# Patient Record
Sex: MALE | Race: WHITE | HISPANIC OR LATINO | URBAN - METROPOLITAN AREA
[De-identification: names, ages, dates, MRNs, and addresses within clinical notes are randomized per-mention and may not be internally consistent; named-entity substitution may affect disease eponyms.]

---

## 2022-03-17 ENCOUNTER — EMERGENCY (EMERGENCY)
Facility: HOSPITAL | Age: 27
LOS: 0 days | Discharge: HOME | End: 2022-03-17
Attending: EMERGENCY MEDICINE | Admitting: EMERGENCY MEDICINE
Payer: MEDICAID

## 2022-03-17 VITALS
DIASTOLIC BLOOD PRESSURE: 72 MMHG | WEIGHT: 160.06 LBS | HEART RATE: 61 BPM | SYSTOLIC BLOOD PRESSURE: 123 MMHG | TEMPERATURE: 98 F | HEIGHT: 64 IN | RESPIRATION RATE: 20 BRPM | OXYGEN SATURATION: 100 %

## 2022-03-17 PROCEDURE — 99053 MED SERV 10PM-8AM 24 HR FAC: CPT

## 2022-03-17 PROCEDURE — 99284 EMERGENCY DEPT VISIT MOD MDM: CPT

## 2022-03-17 NOTE — ED PROVIDER NOTE - PATIENT PORTAL LINK FT
You can access the FollowMyHealth Patient Portal offered by [facility] by registering at the following website: [email or web address]. By joining [facility]’s FollowMyHealth portal, you will also be able to view your health information using other applications (apps) compatible with our system.
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
## 2022-03-17 NOTE — ED PROVIDER NOTE - OBJECTIVE STATEMENT
27 yo male intox sts drank bottle of jhonatan. has no complaints. denies si  hi. asking for apple juice.
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
## 2022-03-18 DIAGNOSIS — F10.929 ALCOHOL USE, UNSPECIFIED WITH INTOXICATION, UNSPECIFIED: ICD-10-CM

## 2022-04-08 ENCOUNTER — EMERGENCY (EMERGENCY)
Facility: HOSPITAL | Age: 27
LOS: 0 days | Discharge: HOME | End: 2022-04-08
Attending: EMERGENCY MEDICINE | Admitting: EMERGENCY MEDICINE
Payer: MEDICAID

## 2022-04-08 VITALS
HEART RATE: 72 BPM | HEIGHT: 64 IN | RESPIRATION RATE: 18 BRPM | OXYGEN SATURATION: 99 % | SYSTOLIC BLOOD PRESSURE: 125 MMHG | TEMPERATURE: 97 F | DIASTOLIC BLOOD PRESSURE: 60 MMHG

## 2022-04-08 VITALS
OXYGEN SATURATION: 99 % | HEART RATE: 64 BPM | SYSTOLIC BLOOD PRESSURE: 106 MMHG | DIASTOLIC BLOOD PRESSURE: 63 MMHG | TEMPERATURE: 97 F | RESPIRATION RATE: 18 BRPM

## 2022-04-08 DIAGNOSIS — F41.8 OTHER SPECIFIED ANXIETY DISORDERS: ICD-10-CM

## 2022-04-08 DIAGNOSIS — F17.200 NICOTINE DEPENDENCE, UNSPECIFIED, UNCOMPLICATED: ICD-10-CM

## 2022-04-08 DIAGNOSIS — Z88.2 ALLERGY STATUS TO SULFONAMIDES: ICD-10-CM

## 2022-04-08 DIAGNOSIS — R45.851 SUICIDAL IDEATIONS: ICD-10-CM

## 2022-04-08 DIAGNOSIS — F19.10 OTHER PSYCHOACTIVE SUBSTANCE ABUSE, UNCOMPLICATED: ICD-10-CM

## 2022-04-08 DIAGNOSIS — F20.9 SCHIZOPHRENIA, UNSPECIFIED: ICD-10-CM

## 2022-04-08 LAB
ALBUMIN SERPL ELPH-MCNC: 4.6 G/DL — SIGNIFICANT CHANGE UP (ref 3.5–5.2)
ALP SERPL-CCNC: 63 U/L — SIGNIFICANT CHANGE UP (ref 30–115)
ALT FLD-CCNC: 12 U/L — SIGNIFICANT CHANGE UP (ref 0–41)
AMPHET UR-MCNC: POSITIVE
ANION GAP SERPL CALC-SCNC: 14 MMOL/L — SIGNIFICANT CHANGE UP (ref 7–14)
APAP SERPL-MCNC: <5 UG/ML — LOW (ref 10–30)
AST SERPL-CCNC: 23 U/L — SIGNIFICANT CHANGE UP (ref 0–41)
BARBITURATES UR SCN-MCNC: NEGATIVE — SIGNIFICANT CHANGE UP
BASOPHILS # BLD AUTO: 0.04 K/UL — SIGNIFICANT CHANGE UP (ref 0–0.2)
BASOPHILS NFR BLD AUTO: 0.5 % — SIGNIFICANT CHANGE UP (ref 0–1)
BENZODIAZ UR-MCNC: NEGATIVE — SIGNIFICANT CHANGE UP
BILIRUB SERPL-MCNC: 0.2 MG/DL — SIGNIFICANT CHANGE UP (ref 0.2–1.2)
BUN SERPL-MCNC: 19 MG/DL — SIGNIFICANT CHANGE UP (ref 10–20)
CALCIUM SERPL-MCNC: 9 MG/DL — SIGNIFICANT CHANGE UP (ref 8.5–10.1)
CHLORIDE SERPL-SCNC: 102 MMOL/L — SIGNIFICANT CHANGE UP (ref 98–110)
CO2 SERPL-SCNC: 21 MMOL/L — SIGNIFICANT CHANGE UP (ref 17–32)
COCAINE METAB.OTHER UR-MCNC: NEGATIVE — SIGNIFICANT CHANGE UP
CREAT SERPL-MCNC: 0.8 MG/DL — SIGNIFICANT CHANGE UP (ref 0.7–1.5)
DRUG SCREEN 1, URINE RESULT: SIGNIFICANT CHANGE UP
EGFR: 125 ML/MIN/1.73M2 — SIGNIFICANT CHANGE UP
EOSINOPHIL # BLD AUTO: 0.19 K/UL — SIGNIFICANT CHANGE UP (ref 0–0.7)
EOSINOPHIL NFR BLD AUTO: 2.1 % — SIGNIFICANT CHANGE UP (ref 0–8)
ETHANOL SERPL-MCNC: <10 MG/DL — SIGNIFICANT CHANGE UP
FENTANYL UR QL: NEGATIVE — SIGNIFICANT CHANGE UP
GLUCOSE SERPL-MCNC: 137 MG/DL — HIGH (ref 70–99)
HCT VFR BLD CALC: 40.9 % — LOW (ref 42–52)
HGB BLD-MCNC: 13.8 G/DL — LOW (ref 14–18)
IMM GRANULOCYTES NFR BLD AUTO: 0.2 % — SIGNIFICANT CHANGE UP (ref 0.1–0.3)
LYMPHOCYTES # BLD AUTO: 1.78 K/UL — SIGNIFICANT CHANGE UP (ref 1.2–3.4)
LYMPHOCYTES # BLD AUTO: 20.1 % — LOW (ref 20.5–51.1)
MCHC RBC-ENTMCNC: 27.9 PG — SIGNIFICANT CHANGE UP (ref 27–31)
MCHC RBC-ENTMCNC: 33.7 G/DL — SIGNIFICANT CHANGE UP (ref 32–37)
MCV RBC AUTO: 82.8 FL — SIGNIFICANT CHANGE UP (ref 80–94)
METHADONE UR-MCNC: NEGATIVE — SIGNIFICANT CHANGE UP
MONOCYTES # BLD AUTO: 0.51 K/UL — SIGNIFICANT CHANGE UP (ref 0.1–0.6)
MONOCYTES NFR BLD AUTO: 5.7 % — SIGNIFICANT CHANGE UP (ref 1.7–9.3)
NEUTROPHILS # BLD AUTO: 6.33 K/UL — SIGNIFICANT CHANGE UP (ref 1.4–6.5)
NEUTROPHILS NFR BLD AUTO: 71.4 % — SIGNIFICANT CHANGE UP (ref 42.2–75.2)
NRBC # BLD: 0 /100 WBCS — SIGNIFICANT CHANGE UP (ref 0–0)
OPIATES UR-MCNC: NEGATIVE — SIGNIFICANT CHANGE UP
OXYCODONE UR-MCNC: NEGATIVE — SIGNIFICANT CHANGE UP
PCP UR-MCNC: NEGATIVE — SIGNIFICANT CHANGE UP
PLATELET # BLD AUTO: 295 K/UL — SIGNIFICANT CHANGE UP (ref 130–400)
POTASSIUM SERPL-MCNC: 4.1 MMOL/L — SIGNIFICANT CHANGE UP (ref 3.5–5)
POTASSIUM SERPL-SCNC: 4.1 MMOL/L — SIGNIFICANT CHANGE UP (ref 3.5–5)
PROPOXYPHENE QUALITATIVE URINE RESULT: NEGATIVE — SIGNIFICANT CHANGE UP
PROT SERPL-MCNC: 6.6 G/DL — SIGNIFICANT CHANGE UP (ref 6–8)
RBC # BLD: 4.94 M/UL — SIGNIFICANT CHANGE UP (ref 4.7–6.1)
RBC # FLD: 13.1 % — SIGNIFICANT CHANGE UP (ref 11.5–14.5)
SALICYLATES SERPL-MCNC: <0.3 MG/DL — LOW (ref 4–30)
SARS-COV-2 RNA SPEC QL NAA+PROBE: SIGNIFICANT CHANGE UP
SODIUM SERPL-SCNC: 137 MMOL/L — SIGNIFICANT CHANGE UP (ref 135–146)
THC UR QL: POSITIVE
WBC # BLD: 8.87 K/UL — SIGNIFICANT CHANGE UP (ref 4.8–10.8)
WBC # FLD AUTO: 8.87 K/UL — SIGNIFICANT CHANGE UP (ref 4.8–10.8)

## 2022-04-08 PROCEDURE — 93010 ELECTROCARDIOGRAM REPORT: CPT

## 2022-04-08 PROCEDURE — 99285 EMERGENCY DEPT VISIT HI MDM: CPT

## 2022-04-08 PROCEDURE — 99053 MED SERV 10PM-8AM 24 HR FAC: CPT

## 2022-04-08 PROCEDURE — 90792 PSYCH DIAG EVAL W/MED SRVCS: CPT
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
## 2022-04-08 NOTE — ED PROVIDER NOTE - OBJECTIVE STATEMENT
The patient is a 26 year old male with a history of alcohol and marijuana use, schizophrenia presenting for anxiety and suicidal ideation. Patient states he feels like overdosing from stress; states he hears voices telling him "to do bad things." Also states he has been having arguments with the people that live around him who he say have been stealing things from him. States he used cocaine yesterday. Denies chest pain, sob.
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
## 2022-04-08 NOTE — SBIRT NOTE ADULT - NSSBIRTALCPASSREFTXDET_GEN_A_CORE
Referral for complete assessment and level of care determination at a certified treatment facility was completed by giving the patient information for treatment facilities that met their needs and encouraging them to call for an appointment. Patient requested and was provided with referral information to [facility], [address], [place] [id number], [phone].  Patient provided with phone number for telephonic SBIRT ([phone]) for future follow up.  Naloxone Rescue Kit dispensed: Pt was educated about Naloxone and trained on how utilize the kit.
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
## 2022-04-08 NOTE — ED PROVIDER NOTE - PHYSICAL EXAMINATION
CONSTITUTIONAL: Well-developed; well-nourished; in no acute distress.   SKIN: warm, dry  HEAD: Normocephalic; atraumatic.  EYES: no conjunctival injection. PERRLA. EOMI.   ENT: No nasal discharge; airway clear.  NECK: Supple; non tender.  CARD: S1, S2 normal; no murmurs, gallops, or rubs. Regular rate and rhythm.   RESP: No wheezes, rales or rhonchi.  ABD: soft ntnd  EXT: Normal ROM.  No clubbing, cyanosis or edema.   LYMPH: No acute cervical adenopathy.  NEURO: Alert, oriented, grossly unremarkable.  PSYCH: Cooperative, +SI, +auditory hallucinations.

## 2022-04-08 NOTE — SBIRT NOTE ADULT - NSSBIRTBRIEFINTDET_GEN_A_CORE
Provided SBIRT services: Full Screen Positive. Brief Intervention and Referral to Treatment Performed.   Screening results were reviewed with the patient and patient was provided information about healthy guidelines and potential negative consequences associated with level of risk. Motivation and readiness to reduce or stop use was discussed and goals and activities to make changes were suggested/offered.

## 2022-04-08 NOTE — ED PROVIDER NOTE - CLINICAL SUMMARY MEDICAL DECISION MAKING FREE TEXT BOX
Received pt from Dr Gaitan awaiting psychiatric assessment after he presented with suicidal ideation. He remained calm in the ED. He was evaluated by psych who felt he was safe to be discharged; he spoke with social work as well and outpt followup has been arranged. He understands to return to the ED for persistent symptoms.

## 2022-04-08 NOTE — ED PROVIDER NOTE - NS ED ROS FT
Eyes:  No visual changes, eye pain or discharge.  ENMT:  No hearing changes, pain, discharge or infections. No neck pain or stiffness.  Cardiac:  no chest pain, SOB or edema. No chest pain with exertion.  Respiratory:  No cough or respiratory distress. No hemoptysis..  GI:  No nausea, vomiting, diarrhea or abdominal pain.  :  No dysuria, frequency or burning.  MS:  No myalgia, muscle weakness, joint pain or back pain.  Neuro:  No headache or weakness.  No LOC.  Skin:  No skin rash.   Endocrine: No history of thyroid disease or diabetes.

## 2022-04-08 NOTE — ED PROVIDER NOTE - NSFOLLOWUPINSTRUCTIONS_ED_ALL_ED_FT
Your visit in the emergency department today did not reveal anything immediately life-threatening.    However, it is important that you follow-up with PSYCHIATRY and THERAPY/COUNSELING.    Additionally, it is important that you follow-up with your PRIMARY CARE PHYSICIAN within 3-5 days.  If you do not have a primary care physician, please call your health insurance to select one.  If you do not have health insurance, please schedule an appointment with the [facility]: [phone], located at [address] [place] [address].    ---------------------------------------------------------------------------------------------------    Sometimes after experiencing life stressors, you may have symptoms that include sadness, hopelessness, helplessness, restlessness, fatigue, irritability, and/or difficulty concentrating. This may interfere with life functions, including relationships, work, and school.    If these symptoms become persistent and disruptive to your everyday life, they may be due to depression, anxiety, or another mental illness. These illnesses can be addressed with medications and therapy.    Medications sometimes take a while to start working. Plus, it sometimes takes a few tries to find the right medication or combination of medication If you were started on a medication, make sure to take exactly as prescribed and follow up with a psychiatrist. In addition to medicine, psychotherapy (counseling) can help. This involves meeting with a therapist to talk about your feelings, thoughts, and life. There are different types of psychotherapy. In general, they all focus on helping you learn new ways of thinking and behaving, so you can better cope with your life stressors.    [facility] WELL: [phone]-[facility]-WELL ([phone])  At any hour of any day, in almost any language, from phone, tablet or computer, [facility] Well is your connection to get the help you need: peer support and short-term counseling via telephone, text and web.    SEEK IMMEDIATE MEDICAL CARE IF YOU HAVE ANY OF THE FOLLOWING SYMPTOMS: thoughts about hurting killing yourself, thoughts about hurting or killing somebody else, hallucinations, or worsening depression. Your visit in the emergency department today did not reveal anything immediately life-threatening.    However, it is important that you follow-up with PSYCHIATRY and THERAPY/COUNSELING as referred by Psychiatry Team here.     Additionally, it is important that you follow-up with your PRIMARY CARE PHYSICIAN within 3-5 days.  If you do not have a primary care physician, please call your health insurance to select one.  If you do not have health insurance, please schedule an appointment with the [facility]: [phone], located at [address] [place] [address].    ---------------------------------------------------------------------------------------------------    Sometimes after experiencing life stressors, you may have symptoms that include sadness, hopelessness, helplessness, restlessness, fatigue, irritability, and/or difficulty concentrating. This may interfere with life functions, including relationships, work, and school.    If these symptoms become persistent and disruptive to your everyday life, they may be due to depression, anxiety, or another mental illness. These illnesses can be addressed with medications and therapy.    Medications sometimes take a while to start working. Plus, it sometimes takes a few tries to find the right medication or combination of medication If you were started on a medication, make sure to take exactly as prescribed and follow up with a psychiatrist. In addition to medicine, psychotherapy (counseling) can help. This involves meeting with a therapist to talk about your feelings, thoughts, and life. There are different types of psychotherapy. In general, they all focus on helping you learn new ways of thinking and behaving, so you can better cope with your life stressors.    [facility]: [phone] ([phone])  At any hour of any day, in almost any language, from phone, tablet or computer, [facility] is your connection to get the help you need: peer support and short-term counseling via telephone, text and web.    SEEK IMMEDIATE MEDICAL CARE IF YOU HAVE ANY OF THE FOLLOWING SYMPTOMS: thoughts about hurting killing yourself, thoughts about hurting or killing somebody else, hallucinations, or worsening depression.

## 2022-04-08 NOTE — CHART NOTE - NSCHARTNOTEFT_GEN_A_CORE
Pt is a 26 year old male who presented to the ED with a c/o homicidal thoughts; pt reported that he has Schizophrenia, is feeling stressed and feels as though he wants to hurt himself or someone else (as per the ED Triage Note).  SW left  for the CATCH Team requesting that they f/up with pt in the ED.    	   Page 1 of 1

## 2022-04-08 NOTE — ED ADULT TRIAGE NOTE - CHIEF COMPLAINT QUOTE
pt sts he has schizophrenia and he is feeling stressed and he feels like he wants to hurts someone or himself. pt placed on nsg 1:1 for safety.

## 2022-04-08 NOTE — ED BEHAVIORAL HEALTH ASSESSMENT NOTE - DESCRIPTION
Utilize 1:1 in the ED See above Single, residing with uncle. Currently employed in a restaurant and works full time.

## 2022-04-08 NOTE — ED PROVIDER NOTE - NS ED MD DISPO DISCHARGE CCDA
Pt's mom returned phone call.    CCR transferred call accordingly.       Patient/Caregiver provided printed discharge information.

## 2022-04-08 NOTE — ED PROVIDER NOTE - PROGRESS NOTE DETAILS
Kandy: I d/w Dr. Ruby of telepsych, will see pt. TA: Consulted telepsych NAD, well appearing. Signed off care to Dr. NANCY Escoto who will f/u psych c/s. Resident AO: Received as a sign-out from Dr. Lewis. Patient reassessed ~[id number], he denied any complaints, was comfortably resting in a recliner, warm blanket given.   Received phone call from psych a few minutes ago - plan for discharge (outpatient follow-up in progress) Resident AO: Spoke with psychiatry team - patient has outpatient follow up set up, and has been seen by the CATCH teammegan for discharge. Discussed with patient, he is aware of plan and agrees. Return precautions given. Patient presently denies any thoughts of self-harm, suicidal ideation, or homicidal ideations. He is not a threat to self or others.
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
## 2022-04-08 NOTE — ED PROVIDER NOTE - PATIENT PORTAL LINK FT
You can access the FollowMyHealth Patient Portal offered by [facility] by registering at the following website: [email or web address]. By joining [facility]’s FollowMyHealth portal, you will also be able to view your health information using other applications (apps) compatible with our system.
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
## 2022-04-10 ENCOUNTER — INPATIENT (INPATIENT)
Facility: HOSPITAL | Age: 27
LOS: 2 days | Discharge: HOME | End: 2022-04-13
Attending: PSYCHIATRY & NEUROLOGY | Admitting: PSYCHIATRY & NEUROLOGY
Payer: MEDICAID

## 2022-04-10 VITALS
RESPIRATION RATE: 17 BRPM | DIASTOLIC BLOOD PRESSURE: 73 MMHG | TEMPERATURE: 98 F | HEART RATE: 68 BPM | SYSTOLIC BLOOD PRESSURE: 121 MMHG | HEIGHT: 64 IN | OXYGEN SATURATION: 97 % | WEIGHT: 139.99 LBS

## 2022-04-10 PROCEDURE — 99285 EMERGENCY DEPT VISIT HI MDM: CPT

## 2022-04-10 NOTE — ED ADULT TRIAGE NOTE - CHIEF COMPLAINT QUOTE
Pt came c/o lightheadedness after taking an "ectasy pill" today. Pt stated he wants to die by overdosing, also has homicidal thoughts, c/o being depressed and hearing male voice in Kyrgy[place] telling him to hurt other people.

## 2022-04-11 DIAGNOSIS — F29 UNSPECIFIED PSYCHOSIS NOT DUE TO A SUBSTANCE OR KNOWN PHYSIOLOGICAL CONDITION: ICD-10-CM

## 2022-04-11 LAB
ALBUMIN SERPL ELPH-MCNC: 4.7 G/DL — SIGNIFICANT CHANGE UP (ref 3.5–5.2)
ALP SERPL-CCNC: 65 U/L — SIGNIFICANT CHANGE UP (ref 30–115)
ALT FLD-CCNC: 13 U/L — SIGNIFICANT CHANGE UP (ref 0–41)
AMPHET UR QL SCN: 263 NG/ML — SIGNIFICANT CHANGE UP
AMPHET UR QL SCN: POSITIVE
AMPHETAMINE IN-HOUSE INTERPRETATION: POSITIVE
AMPHETAMINE, UR RESULT: 263 NG/ML — SIGNIFICANT CHANGE UP
ANION GAP SERPL CALC-SCNC: 12 MMOL/L — SIGNIFICANT CHANGE UP (ref 7–14)
APAP SERPL-MCNC: <5 UG/ML — LOW (ref 10–30)
AST SERPL-CCNC: 24 U/L — SIGNIFICANT CHANGE UP (ref 0–41)
BASOPHILS # BLD AUTO: 0.02 K/UL — SIGNIFICANT CHANGE UP (ref 0–0.2)
BASOPHILS NFR BLD AUTO: 0.3 % — SIGNIFICANT CHANGE UP (ref 0–1)
BILIRUB DIRECT SERPL-MCNC: <0.2 MG/DL — SIGNIFICANT CHANGE UP (ref 0–0.3)
BILIRUB INDIRECT FLD-MCNC: >0.1 MG/DL — LOW (ref 0.2–1.2)
BILIRUB SERPL-MCNC: 0.3 MG/DL — SIGNIFICANT CHANGE UP (ref 0.2–1.2)
BUN SERPL-MCNC: 17 MG/DL — SIGNIFICANT CHANGE UP (ref 10–20)
CALCIUM SERPL-MCNC: 9.4 MG/DL — SIGNIFICANT CHANGE UP (ref 8.5–10.1)
CHLORIDE SERPL-SCNC: 103 MMOL/L — SIGNIFICANT CHANGE UP (ref 98–110)
CO2 SERPL-SCNC: 21 MMOL/L — SIGNIFICANT CHANGE UP (ref 17–32)
CREAT SERPL-MCNC: 0.7 MG/DL — SIGNIFICANT CHANGE UP (ref 0.7–1.5)
EGFR: 130 ML/MIN/1.73M2 — SIGNIFICANT CHANGE UP
EOSINOPHIL # BLD AUTO: 0.18 K/UL — SIGNIFICANT CHANGE UP (ref 0–0.7)
EOSINOPHIL NFR BLD AUTO: 2.6 % — SIGNIFICANT CHANGE UP (ref 0–8)
ETHANOL SERPL-MCNC: <10 MG/DL — SIGNIFICANT CHANGE UP
GLUCOSE SERPL-MCNC: 102 MG/DL — HIGH (ref 70–99)
HCT VFR BLD CALC: 39.9 % — LOW (ref 42–52)
HGB BLD-MCNC: 13.6 G/DL — LOW (ref 14–18)
IMM GRANULOCYTES NFR BLD AUTO: 0.3 % — SIGNIFICANT CHANGE UP (ref 0.1–0.3)
LYMPHOCYTES # BLD AUTO: 2.29 K/UL — SIGNIFICANT CHANGE UP (ref 1.2–3.4)
LYMPHOCYTES # BLD AUTO: 32.9 % — SIGNIFICANT CHANGE UP (ref 20.5–51.1)
MCHC RBC-ENTMCNC: 28.1 PG — SIGNIFICANT CHANGE UP (ref 27–31)
MCHC RBC-ENTMCNC: 34.1 G/DL — SIGNIFICANT CHANGE UP (ref 32–37)
MCV RBC AUTO: 82.4 FL — SIGNIFICANT CHANGE UP (ref 80–94)
MDA UR QL SCN: NEGATIVE NG/ML — SIGNIFICANT CHANGE UP
MDA, UR RESULT: NEGATIVE NG/ML — SIGNIFICANT CHANGE UP
MDEA, UR RESULT: NEGATIVE NG/ML — SIGNIFICANT CHANGE UP
MDMA UR QL SCN: NEGATIVE NG/ML — SIGNIFICANT CHANGE UP
MDMA, UR RESULT: NEGATIVE NG/ML — SIGNIFICANT CHANGE UP
METHAMPHET UR QL SCN: 376 NG/ML — SIGNIFICANT CHANGE UP
METHAMPHETAMINE, UR RESULT: 376 NG/ML — SIGNIFICANT CHANGE UP
MONOCYTES # BLD AUTO: 0.61 K/UL — HIGH (ref 0.1–0.6)
MONOCYTES NFR BLD AUTO: 8.8 % — SIGNIFICANT CHANGE UP (ref 1.7–9.3)
NEUTROPHILS # BLD AUTO: 3.84 K/UL — SIGNIFICANT CHANGE UP (ref 1.4–6.5)
NEUTROPHILS NFR BLD AUTO: 55.1 % — SIGNIFICANT CHANGE UP (ref 42.2–75.2)
NRBC # BLD: 0 /100 WBCS — SIGNIFICANT CHANGE UP (ref 0–0)
PLATELET # BLD AUTO: 299 K/UL — SIGNIFICANT CHANGE UP (ref 130–400)
POTASSIUM SERPL-MCNC: 4.1 MMOL/L — SIGNIFICANT CHANGE UP (ref 3.5–5)
POTASSIUM SERPL-SCNC: 4.1 MMOL/L — SIGNIFICANT CHANGE UP (ref 3.5–5)
PROT SERPL-MCNC: 6.6 G/DL — SIGNIFICANT CHANGE UP (ref 6–8)
RBC # BLD: 4.84 M/UL — SIGNIFICANT CHANGE UP (ref 4.7–6.1)
RBC # FLD: 13.2 % — SIGNIFICANT CHANGE UP (ref 11.5–14.5)
SALICYLATES SERPL-MCNC: <0.3 MG/DL — LOW (ref 4–30)
SARS-COV-2 RNA SPEC QL NAA+PROBE: SIGNIFICANT CHANGE UP
SODIUM SERPL-SCNC: 136 MMOL/L — SIGNIFICANT CHANGE UP (ref 135–146)
TROPONIN T SERPL-MCNC: <0.01 NG/ML — SIGNIFICANT CHANGE UP
WBC # BLD: 6.96 K/UL — SIGNIFICANT CHANGE UP (ref 4.8–10.8)
WBC # FLD AUTO: 6.96 K/UL — SIGNIFICANT CHANGE UP (ref 4.8–10.8)

## 2022-04-11 PROCEDURE — 99215 OFFICE O/P EST HI 40 MIN: CPT

## 2022-04-11 PROCEDURE — 90792 PSYCH DIAG EVAL W/MED SRVCS: CPT | Mod: 95

## 2022-04-11 PROCEDURE — 93010 ELECTROCARDIOGRAM REPORT: CPT

## 2022-04-11 PROCEDURE — 71045 X-RAY EXAM CHEST 1 VIEW: CPT | Mod: 26

## 2022-04-11 RX ORDER — NICOTINE POLACRILEX 2 MG
1 GUM BUCCAL DAILY
Refills: 0 | Status: DISCONTINUED | OUTPATIENT
Start: 2022-04-11 | End: 2022-04-13

## 2022-04-11 RX ORDER — HALOPERIDOL DECANOATE 100 MG/ML
5 INJECTION INTRAMUSCULAR EVERY 8 HOURS
Refills: 0 | Status: DISCONTINUED | OUTPATIENT
Start: 2022-04-11 | End: 2022-04-13

## 2022-04-11 RX ORDER — SODIUM CHLORIDE 9 MG/ML
1000 INJECTION, SOLUTION INTRAVENOUS ONCE
Refills: 0 | Status: COMPLETED | OUTPATIENT
Start: 2022-04-11 | End: 2022-04-11

## 2022-04-11 RX ORDER — QUETIAPINE FUMARATE 200 MG/1
100 TABLET, FILM COATED ORAL AT BEDTIME
Refills: 0 | Status: DISCONTINUED | OUTPATIENT
Start: 2022-04-11 | End: 2022-04-13

## 2022-04-11 RX ORDER — HALOPERIDOL DECANOATE 100 MG/ML
5 INJECTION INTRAMUSCULAR ONCE
Refills: 0 | Status: COMPLETED | OUTPATIENT
Start: 2022-04-11 | End: 2022-04-11

## 2022-04-11 RX ADMIN — QUETIAPINE FUMARATE 100 MILLIGRAM(S): 200 TABLET, FILM COATED ORAL at 20:18

## 2022-04-11 RX ADMIN — Medication 1 PATCH: at 17:15

## 2022-04-11 RX ADMIN — HALOPERIDOL DECANOATE 5 MILLIGRAM(S): 100 INJECTION INTRAMUSCULAR at 03:51

## 2022-04-11 RX ADMIN — SODIUM CHLORIDE 1000 MILLILITER(S): 9 INJECTION, SOLUTION INTRAVENOUS at 06:20

## 2022-04-11 RX ADMIN — Medication 1 PATCH: at 19:06

## 2022-04-11 RX ADMIN — Medication 2 MILLIGRAM(S): at 03:51

## 2022-04-11 NOTE — ED BEHAVIORAL HEALTH ASSESSMENT NOTE - DESCRIPTION
see above denies In the ER patient noted to be on edge.  U tox pending.  VSS.  Labs unremarkable.  Overall in behavioral control.
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
## 2022-04-11 NOTE — PROGRESS NOTE BEHAVIORAL HEALTH - NSBHCHARTREVIEWLAB_PSY_A_CORE FT
13.6   6.96  )-----------( 299      ( 11 Apr 2022 01:00 )             39.9   04-11    136  |  103  |  17  ----------------------------<  102<H>  4.1   |  21  |  0.7    Ca    9.4      11 Apr 2022 01:00    TPro  6.6  /  Alb  4.7  /  TBili  0.3  /  DBili  <0.2  /  AST  24  /  ALT  13  /  AlkPhos  65  04-11
13.6   6.96  )-----------( 299      ( 11 Apr 2022 01:00 )             39.9     04-11    136  |  103  |  17  ----------------------------<  102<H>  4.1   |  21  |  0.7    Ca    9.4      11 Apr 2022 01:00    TPro  6.6  /  Alb  4.7  /  TBili  0.3  /  DBili  <0.2  /  AST  24  /  ALT  13  /  AlkPhos  65  04-11
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
## 2022-04-11 NOTE — PATIENT PROFILE BEHAVIORAL HEALTH - FALL HARM RISK - UNIVERSAL INTERVENTIONS
Bed in lowest position, wheels locked, appropriate side rails in place/Call bell, personal items and telephone in reach/Instruct patient to call for assistance before getting out of bed or chair/Non-slip footwear when patient is out of bed/[place] to call system/Physically safe environment - no spills, clutter or unnecessary equipment/Purposeful Proactive Rounding/Room/bathroom lighting operational, light cord in reach

## 2022-04-11 NOTE — PROGRESS NOTE BEHAVIORAL HEALTH - NSBHCHARTREVIEWINVESTIGATE_PSY_A_CORE FT
< from: 12 Lead ECG (04.11.22 @ 06:18) >      Ventricular Rate 47 BPM    Atrial Rate 47 BPM    P-R Interval 158 ms    QRS Duration 88 ms    Q-T Interval 440 ms    QTC Calculation(Bazett) 389 ms    P Axis 55 degrees    R Axis 39 degrees    T Axis 45 degrees    Diagnosis Line Sinus bradycardia  Otherwise normal ECG    Confirmed by Monika Chavez MD ([id number]) on 4/11/2022 8:00:43 AM    < end of copied text >
< from: 12 Lead ECG (04.11.22 @ 06:18) >    Ventricular Rate 47 BPM    Atrial Rate 47 BPM    P-R Interval 158 ms    QRS Duration 88 ms    Q-T Interval 440 ms    QTC Calculation(Bazett) 389 ms    P Axis 55 degrees    R Axis 39 degrees    T Axis 45 degrees    Diagnosis Line Sinus bradycardia  Otherwise normal ECG    Confirmed by Monika Chavez MD ([id number]) on 4/11/2022 8:00:43 AM    < end of copied text >
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
## 2022-04-11 NOTE — PROGRESS NOTE BEHAVIORAL HEALTH - NSBHCHARTREVIEWIMAGING_PSY_A_CORE FT
< from: Xray Chest 1 View-PORTABLE IMMEDIATE (Xray Chest 1 View-PORTABLE IMMEDIATE .) (04.11.22 @ 01:23) >      ACC: [id number] EXAM:  XR CHEST PORTABLE IMMED 1V                          PROCEDURE DATE:  04/11/2022          INTERPRETATION:  Clinical History / Reason for exam: Cough    Comparison : Chest radiograph None.    Technique/Positioning: Frontal chest radiograph.    Findings:    Support devices: None.    Cardiac/mediastinum/hilum: Unremarkable.    Lung parenchyma/Pleura: Within normal limits.    Skeleton/soft tissues: Unremarkable.    Impression:    No radiographic evidence of acute cardiopulmonarydisease.        --- End of Report ---    < end of copied text >
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
## 2022-04-11 NOTE — ED ADULT NURSE NOTE - CHIEF COMPLAINT QUOTE
Pt came c/o lightheadedness after taking an "ectasy pill" today. Pt stated he wants to die by overdosing, also has homicidal thoughts, c/o being depressed and hearing male voice in Tuvaluan telling him to hurt other people.
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
## 2022-04-11 NOTE — PROGRESS NOTE BEHAVIORAL HEALTH - SUMMARY
26 year old man, originally from  [place], [place], has been living in [place] for more than a year, currently employed and living w/ uncle vs roommate in [place], with reported psychiatric history of borderline autism spectrum disorder, PTSD, bipolar disorder and polysubstance use (alcohol, cocaine, MJ, ecstasy), multiple prior inpatient psychiatry admissions, in the setting of substance intoxication and overdose, most recent hospitalization was in April of 2021; not currently in outpt tx, reports past SA 2.5 weeks ago vis polysubstance ingestion, recent ER visit 4/8/22, no known medical history, extensive legal hx, BIBS for lightheadedness i/s/o MDMA use, also reporting SI/HI/AH.    Upon evaluation, patient endorses symptoms consistent with substance induced psychotic disorder, as well as recent SI, HI, CAH to harm others, and violent acts toward others.  Patient requires inpatient psychiatric hospitalization for safety, stabilization, and treatment.

## 2022-04-11 NOTE — H&P ADULT - NSHPLABSRESULTS_GEN_ALL_CORE
13.6   6.96  )-----------( 299      ( 11 Apr 2022 01:00 )             39.9       04-11    136  |  103  |  17  ----------------------------<  102<H>  4.1   |  21  |  0.7    Ca    9.4      11 Apr 2022 01:00    TPro  6.6  /  Alb  4.7  /  TBili  0.3  /  DBili  <0.2  /  AST  24  /  ALT  13  /  AlkPhos  65  04-11              < from: Xray Chest 1 View-PORTABLE IMMEDIATE (Xray Chest 1 View-PORTABLE IMMEDIATE .) (04.11.22 @ 01:23) >  Impression:  No radiographic evidence of acute cardiopulmonarydisease.  --- End of Report ---  RADHA VICTOR MD; Attending Radiologist  This document has been electronically signed. Apr 11 2022  9:01AM    < end of copied text >  Lactate Trend    CARDIAC MARKERS ( 11 Apr 2022 01:00 )  x     / <0.01 ng/mL / x     / x     / x

## 2022-04-11 NOTE — H&P ADULT - NSHPPHYSICALEXAM_GEN_ALL_CORE
VITALS:     ICU Vital Signs Last 24 Hrs  T(C): 36.4 (11 Apr 2022 14:16), Max: 36.6 (10 Apr 2022 23:16)  T(F): 97.6 (11 Apr 2022 14:16), Max: 97.8 (10 Apr 2022 23:16)  HR: 61 (11 Apr 2022 14:16) (55 - 93)  BP: 101/58 (11 Apr 2022 14:16) (84/43 - 121/73)  RR: 18 (11 Apr 2022 14:16) (16 - 18)  SpO2: 99% (11 Apr 2022 14:16) (97% - 100%)      GENERAL: NAD, lying in bed comfortably, sleeping  HEAD:  Atraumatic, Normocephalic  EYES: EOMI, PERRLA, conjunctiva and sclera clear  ENT: Moist mucous membranes  NECK: Supple, No JVD  CHEST/LUNG: Clear to auscultation bilaterally; No rales, rhonchi, wheezing, or rubs.   HEART: Regular rate and rhythm; No murmurs, rubs, or gallops  ABDOMEN: Soft, Nontender, Nondistended. No hepatomegally  EXTREMITIES:  no edema or tenderness   NERVOUS SYSTEM:  Alert & Oriented X3, speech clear. No deficits   MSK: FROM all 4 extremities, full and equal strength  SKIN: No rashes or lesions

## 2022-04-11 NOTE — ED BEHAVIORAL HEALTH NOTE - BEHAVIORAL HEALTH NOTE
[facility] attempted to contact the following collateral but was unsuccessful:    Uncle - Luis Angel Babcock [phone]  Mother - Jeimy Feliciano [phone]     [facility] was able to leave a v/m for both parties requesting a call back.

## 2022-04-11 NOTE — H&P ADULT - HISTORY OF PRESENT ILLNESS
A 27 y/o male with pmhx of heart murmur,  psychiatric history of borderline autism spectrum disorder, PTSD, bipolar disorder and polysubstance use (alcohol, cocaine, MJ, ecstasy) admitted for suicidal ideation. Pt poor historian wanted to sleep. Pt denied suicidal ideation at this time. Pt reports using cocaine 1x week ago, could not specify amount. Pt reports smoking marijuana.  Pt reports using ecstasy 2 days ago 1 pill. Pt denies hearing voices. Pt denies fever, chills, nausea, vomiting, headache, burning with urination, diarrhea.

## 2022-04-11 NOTE — CHART NOTE - NSCHARTNOTEFT_GEN_A_CORE
chart reviewed and pt interviewed.  Mood pleasant; no psychosis or s/h ideation at this time. States he has been on seroquel before and "it helps"..  UTox positive for amphetamines.     Will monitor

## 2022-04-11 NOTE — ED PROVIDER NOTE - CLINICAL SUMMARY MEDICAL DECISION MAKING FREE TEXT BOX
Patient seen and evaluated by me.  Patient on a 1:1 and psychiatry consulted.  Appropriate clearance labs sent, EKG.  ED work up reviewed and psychiatry evaluated patient.  Patient is medically cleared. Will admit to psychiatry for further work up and treatment. Patient is calm and eating and does not require inpatient 1:1 observation. Patients mother spoken to and plan of care discussed.
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
## 2022-04-11 NOTE — ED BEHAVIORAL HEALTH ASSESSMENT NOTE - RISK ASSESSMENT
Chronic risk factors include legal hx, h/o suicide attempts, and active substance use.  Acute risk elevated given substance intox and psychotic sx w/ reports of SI/HI.  Should be re-assessed after metabolization of substances. Moderate Acute Suicide Risk

## 2022-04-11 NOTE — ED ADULT NURSE NOTE - OBJECTIVE STATEMENT
pt has flight of words characteristics, states "the devil is under the stretcher" and making homicidal statements at staff, however is not physically combative at this time despite being threatening.
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
## 2022-04-11 NOTE — ED PROVIDER NOTE - PROGRESS NOTE DETAILS
Patient remained stable in ED, patient was evaluated by Tele Psychiatry on call. Patient is not cleared by psychiatry on call, recommended ED observation until morning, and reevaluation by our Day time psychiatrist and recommended haldol and ativan for agitation/psychosis. Patient BP dropped transiently, and improved with IVF. Patient is on 1:1 Observation. SS Discussed plan with inpt psych- to be admitted to [facility] pending COVID results.  Spoke w/ mother to update her regarding plan.

## 2022-04-11 NOTE — H&P ADULT - ASSESSMENT
A 25 y/o male with pmhx of heart murmur,  psychiatric history of borderline autism spectrum disorder, PTSD, bipolar disorder and polysubstance use (alcohol, cocaine, MJ, ecstasy) admitted for suicidal ideation.    #bipolar disorder  #suicidal ideation   -management as per psychiatry     #substance abuse   -addiction medicine    #nicotine dependency  -smoking cessation educated  -nicotine patch
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
## 2022-04-12 LAB
A1C WITH ESTIMATED AVERAGE GLUCOSE RESULT: 5.2 % — SIGNIFICANT CHANGE UP (ref 4–5.6)
ALBUMIN SERPL ELPH-MCNC: 4.4 G/DL — SIGNIFICANT CHANGE UP (ref 3.5–5.2)
ALP SERPL-CCNC: 65 U/L — SIGNIFICANT CHANGE UP (ref 30–115)
ALT FLD-CCNC: 14 U/L — SIGNIFICANT CHANGE UP (ref 0–41)
ANION GAP SERPL CALC-SCNC: 13 MMOL/L — SIGNIFICANT CHANGE UP (ref 7–14)
AST SERPL-CCNC: 31 U/L — SIGNIFICANT CHANGE UP (ref 0–41)
BASOPHILS # BLD AUTO: 0.03 K/UL — SIGNIFICANT CHANGE UP (ref 0–0.2)
BASOPHILS NFR BLD AUTO: 0.5 % — SIGNIFICANT CHANGE UP (ref 0–1)
BILIRUB SERPL-MCNC: 0.4 MG/DL — SIGNIFICANT CHANGE UP (ref 0.2–1.2)
BUN SERPL-MCNC: 14 MG/DL — SIGNIFICANT CHANGE UP (ref 10–20)
CALCIUM SERPL-MCNC: 9.7 MG/DL — SIGNIFICANT CHANGE UP (ref 8.5–10.1)
CHLORIDE SERPL-SCNC: 108 MMOL/L — SIGNIFICANT CHANGE UP (ref 98–110)
CHOLEST SERPL-MCNC: 181 MG/DL — SIGNIFICANT CHANGE UP
CO2 SERPL-SCNC: 21 MMOL/L — SIGNIFICANT CHANGE UP (ref 17–32)
CREAT SERPL-MCNC: 0.8 MG/DL — SIGNIFICANT CHANGE UP (ref 0.7–1.5)
EGFR: 125 ML/MIN/1.73M2 — SIGNIFICANT CHANGE UP
EOSINOPHIL # BLD AUTO: 0.2 K/UL — SIGNIFICANT CHANGE UP (ref 0–0.7)
EOSINOPHIL NFR BLD AUTO: 3.2 % — SIGNIFICANT CHANGE UP (ref 0–8)
ESTIMATED AVERAGE GLUCOSE: 103 MG/DL — SIGNIFICANT CHANGE UP (ref 68–114)
FOLATE SERPL-MCNC: 7.7 NG/ML — SIGNIFICANT CHANGE UP
GLUCOSE SERPL-MCNC: 79 MG/DL — SIGNIFICANT CHANGE UP (ref 70–99)
HCT VFR BLD CALC: 46.7 % — SIGNIFICANT CHANGE UP (ref 42–52)
HDLC SERPL-MCNC: 37 MG/DL — LOW
HGB BLD-MCNC: 15 G/DL — SIGNIFICANT CHANGE UP (ref 14–18)
IMM GRANULOCYTES NFR BLD AUTO: 0.2 % — SIGNIFICANT CHANGE UP (ref 0.1–0.3)
LIPID PNL WITH DIRECT LDL SERPL: 127 MG/DL — HIGH
LYMPHOCYTES # BLD AUTO: 1.29 K/UL — SIGNIFICANT CHANGE UP (ref 1.2–3.4)
LYMPHOCYTES # BLD AUTO: 20.5 % — SIGNIFICANT CHANGE UP (ref 20.5–51.1)
MCHC RBC-ENTMCNC: 27.7 PG — SIGNIFICANT CHANGE UP (ref 27–31)
MCHC RBC-ENTMCNC: 32.1 G/DL — SIGNIFICANT CHANGE UP (ref 32–37)
MCV RBC AUTO: 86.2 FL — SIGNIFICANT CHANGE UP (ref 80–94)
MONOCYTES # BLD AUTO: 0.36 K/UL — SIGNIFICANT CHANGE UP (ref 0.1–0.6)
MONOCYTES NFR BLD AUTO: 5.7 % — SIGNIFICANT CHANGE UP (ref 1.7–9.3)
NEUTROPHILS # BLD AUTO: 4.39 K/UL — SIGNIFICANT CHANGE UP (ref 1.4–6.5)
NEUTROPHILS NFR BLD AUTO: 69.9 % — SIGNIFICANT CHANGE UP (ref 42.2–75.2)
NON HDL CHOLESTEROL: 144 MG/DL — HIGH
NRBC # BLD: 0 /100 WBCS — SIGNIFICANT CHANGE UP (ref 0–0)
PLATELET # BLD AUTO: 196 K/UL — SIGNIFICANT CHANGE UP (ref 130–400)
POTASSIUM SERPL-MCNC: 4.7 MMOL/L — SIGNIFICANT CHANGE UP (ref 3.5–5)
POTASSIUM SERPL-SCNC: 4.7 MMOL/L — SIGNIFICANT CHANGE UP (ref 3.5–5)
PROT SERPL-MCNC: 6.8 G/DL — SIGNIFICANT CHANGE UP (ref 6–8)
RBC # BLD: 5.42 M/UL — SIGNIFICANT CHANGE UP (ref 4.7–6.1)
RBC # FLD: 13.2 % — SIGNIFICANT CHANGE UP (ref 11.5–14.5)
SODIUM SERPL-SCNC: 142 MMOL/L — SIGNIFICANT CHANGE UP (ref 135–146)
TRIGL SERPL-MCNC: 130 MG/DL — SIGNIFICANT CHANGE UP
TSH SERPL-MCNC: 2.16 UIU/ML — SIGNIFICANT CHANGE UP (ref 0.27–4.2)
VIT B12 SERPL-MCNC: 667 PG/ML — SIGNIFICANT CHANGE UP (ref 232–1245)
WBC # BLD: 6.28 K/UL — SIGNIFICANT CHANGE UP (ref 4.8–10.8)
WBC # FLD AUTO: 6.28 K/UL — SIGNIFICANT CHANGE UP (ref 4.8–10.8)

## 2022-04-12 PROCEDURE — 99231 SBSQ HOSP IP/OBS SF/LOW 25: CPT | Mod: GC

## 2022-04-12 RX ADMIN — QUETIAPINE FUMARATE 100 MILLIGRAM(S): 200 TABLET, FILM COATED ORAL at 21:02

## 2022-04-12 RX ADMIN — Medication 2 MILLIGRAM(S): at 18:57

## 2022-04-12 RX ADMIN — HALOPERIDOL DECANOATE 5 MILLIGRAM(S): 100 INJECTION INTRAMUSCULAR at 18:56

## 2022-04-12 RX ADMIN — Medication 1 PATCH: at 17:34

## 2022-04-12 RX ADMIN — Medication 1 PATCH: at 07:32

## 2022-04-12 NOTE — DISCHARGE NOTE BEHAVIORAL HEALTH - NSBHDCREFEROTHERFT_PSY_A_CORE
Patient refused substance use referral Patient refused substance use referral  [facility] [phone] provided as an additional resource.

## 2022-04-12 NOTE — DISCHARGE NOTE BEHAVIORAL HEALTH - NS MD DC FALL RISK RISK
For information on Fall & Injury Prevention, visit: [email or web address] OR  [email or web address] OR  https://www.cdc.gov/steadi/patient.html
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
## 2022-04-12 NOTE — DISCHARGE NOTE BEHAVIORAL HEALTH - NSBHDCSWCOMMENTSFT_PSY_A_CORE
Discharge summary to be faxed to [facility] [phone] Discharge summary faxed to [facility] [phone] on 4/13 at 1030am

## 2022-04-12 NOTE — DISCHARGE NOTE BEHAVIORAL HEALTH - NSBHDCVIOLPROTECTFT_PSY_A_CORE
domiciled, employed, engaged in treatment, care seeking, in good physical health, has supportive family figures

## 2022-04-12 NOTE — DISCHARGE NOTE BEHAVIORAL HEALTH - NSBHDCMEDSFT_PSY_A_CORE
Patient was started on seroquel 100mg Qhs to which he responded well without side effects noted.  pt endorsed improvement in sleep and mood, resolution of audiovisual hallucinations.

## 2022-04-12 NOTE — DISCHARGE NOTE BEHAVIORAL HEALTH - NSBHDCMEDICALFT_PSY_A_CORE
Denied medical problems upon admission and no medical problems emerged requiring treatment while in the hospital.

## 2022-04-12 NOTE — PROGRESS NOTE BEHAVIORAL HEALTH - NSBHCHARTREVIEWVS_PSY_A_CORE FT
ICU Vital Signs Last 24 Hrs  T(C): 36.6 (10 Apr 2022 23:16), Max: 36.6 (10 Apr 2022 23:16)  T(F): 97.8 (10 Apr 2022 23:16), Max: 97.8 (10 Apr 2022 23:16)  HR: 56 (11 Apr 2022 07:02) (55 - 93)  BP: 110/78 (11 Apr 2022 07:02) (84/43 - 121/73)  BP(mean): --  ABP: --  ABP(mean): --  RR: 18 (11 Apr 2022 07:02) (16 - 18)  SpO2: 100% (11 Apr 2022 07:02) (97% - 100%)
Vital Signs Last 24 Hrs  T(C): 36.4 (11 Apr 2022 14:16), Max: 36.6 (10 Apr 2022 23:16)  T(F): 97.6 (11 Apr 2022 14:16), Max: 97.8 (10 Apr 2022 23:16)  HR: 61 (11 Apr 2022 14:16) (55 - 93)  BP: 101/58 (11 Apr 2022 14:16) (84/43 - 121/73)  BP(mean): --  RR: 18 (11 Apr 2022 14:16) (16 - 18)  SpO2: 99% (11 Apr 2022 14:16) (97% - 100%)
Vital Signs Last 24 Hrs  T(C): 35.9 (12 Apr 2022 05:57), Max: 36.4 (11 Apr 2022 14:16)  T(F): 96.6 (12 Apr 2022 05:57), Max: 97.6 (11 Apr 2022 14:16)  HR: 65 (12 Apr 2022 08:04) (57 - 95)  BP: 109/72 (12 Apr 2022 08:04) (87/55 - 109/72)  BP(mean): --  RR: 18 (12 Apr 2022 08:04) (16 - 20)  SpO2: 99% (11 Apr 2022 14:16) (99% - 99%)

## 2022-04-12 NOTE — PROGRESS NOTE BEHAVIORAL HEALTH - NSBHADMITIPREASON_PSY_A_CORE
Danger to others; mental illness expected to respond to inpatient care
Danger to others; mental illness expected to respond to inpatient care

## 2022-04-12 NOTE — DISCHARGE NOTE BEHAVIORAL HEALTH - NSBHDCVIOLFCTRMIT_PSY_A_CORE
Patient was engaged in motivational interviewing toward sobriety, he was engaged in learning of new coping skills and was encouraged to rely on social support during times of future stressors/crises.

## 2022-04-12 NOTE — DISCHARGE NOTE BEHAVIORAL HEALTH - NSBHDCSUICSAFETYFT_PSY_A_CORE
Pt received standing in hospital room, NAD. Pt agreeable to PT consultation. Cleared for PT as per JALEN Alvarez see safety play document

## 2022-04-12 NOTE — PROGRESS NOTE BEHAVIORAL HEALTH - VIOLENCE RISK FACTORS:
Antisocial behavior/cognition (past or present)/Violent ideation/threat/speech/Affective dysregulation/Impulsivity/Irritability
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
## 2022-04-12 NOTE — PROGRESS NOTE BEHAVIORAL HEALTH - NSBHFUPINTERVALHXFT_PSY_A_CORE
Patient evaluated at bedside, chart reviewed, per nursing staff no overnight events, no PRNs required.      Upon approach, patient is laying in bed, calm and cooperative.      Attempted to call patient's uncle, Luis Angel Babcock [phone] but no answer; left voicemail requesting a call back. Patient evaluated at bedside, chart reviewed, per nursing staff pt has violent ideations and making threatening speech, but not toward anyone on the unit.  No PRNs required.      Upon approach, patient is laying in bed, calm and cooperative.  He states that he has been having thoughts of wanting to "beat people up" but denies any ideations toward specific people.  Patient denies SI, HI, plans or intent today.  Denies AVH or paranoid thoughts.  When asked about his goals of being in the hospital he continues to talk about vague thoughts of wanting to hurt others.  When asked about his statements made yesterday about wanting to "get clean from drugs" he states "oh yeah sure."  He states that he is still thinking about outpatient rehab.  Patient denies feeling depressed today, denies manic symptoms. He is eating, drinking, sleeping and attending to ADLs without issues.  Denies side effects of Seroquel, started last night.      Patient states that he would like to discharge today or tomorrow so he can see Dr. Wilkes and then get back to work tomorrow.     Attempted to call patient's uncle, Luis Angel Babcock [phone] but no answer; left voicemail requesting a call back. Patient evaluated at bedside, chart reviewed, per nursing staff pt has violent ideations and making threatening speech, but not toward anyone on the unit.  No PRNs required.      Upon approach, patient is laying in bed, calm and cooperative.  He states that he has been having thoughts of wanting to "beat people up" but denies any ideations toward specific people.  Patient denies SI, HI, plans or intent today.  Denies AVH or paranoid thoughts.  When asked about his goals of being in the hospital he continues to talk about vague thoughts of wanting to hurt others.  When asked about his statements made yesterday about wanting to "get clean from drugs" he states "oh yeah sure."  He states that he is still thinking about outpatient rehab.  Patient denies feeling depressed today, denies manic symptoms. He is eating, drinking, sleeping and attending to ADLs without issues.  Denies side effects of Seroquel, started last night.      Patient states that he would like to discharge today or tomorrow so he can see Dr. Wilkes and then get back to work tomorrow.     Spoke with patient's uncle, Luis Angel Babcock [phone] who states the patient "is a good kid, he just needs to take his meds."  Uncle states the pt was much less irritable and less violent while taking his medications.  He endorses that the pt is able to care for himself.  Uncle states that the pt has issues with substance use and would be much better overall if he were sober.  Uncle denies any knowledge of the pt making suicidal or homicidal statements and he does not believe the patient is a risk of harm to self or risk of killing others.  He believes the pt is safe to discharge back to his apartment.

## 2022-04-12 NOTE — DISCHARGE NOTE BEHAVIORAL HEALTH - NSBHDCSIGEVENTSFT_PSY_A_CORE
Patient became verbally agitated while on the phone on 4/12/22, verbal redirection was attempted however pt remained agitated.  Was given PRN haldol 5mg and responded well to this medication

## 2022-04-12 NOTE — PROGRESS NOTE BEHAVIORAL HEALTH - SUMMARY
26 year old man, originally from  [place], [place], has been living in [place] for more than a year, currently employed and living w/ uncle vs roommate in [place], with reported psychiatric history of borderline autism spectrum disorder, PTSD, bipolar disorder and polysubstance use (alcohol, cocaine, MJ, ecstasy), multiple prior inpatient psychiatry admissions, in the setting of substance intoxication and overdose, most recent hospitalization was in April of 2021; not currently in outpt tx, reports past SA 2.5 weeks ago vis polysubstance ingestion, recent ER visit 4/8/22, no known medical history, extensive legal hx, BIBS for lightheadedness i/s/o MDMA use, also reporting SI/HI/AH.    Upon evaluation, patient endorses symptoms consistent with substance induced psychotic disorder, as well as recent SI, HI, CAH to harm others, and violent acts toward others.  Patient requires inpatient psychiatric hospitalization for safety, stabilization, and treatment.

## 2022-04-12 NOTE — PROGRESS NOTE BEHAVIORAL HEALTH - NSBHADMITIPBHPROVFT_PSY_A_CORE
Contact Dr. Charisma Wilkes at [facility] [phone]; spoke with , Dr. Wilkes is not in the office today.  Patient was last seen at the end of January.   stated that the patient may use the walk-in hours to see be a resident at the clinic, from 7:30am to 2pm at [address] [place] [address]; he can be schedule for an appointment with Dr. Wilkes by attending walk in hours. Dr. Wilkes's office Contact Dr. Charisma Wilkes at [facility] [phone]; spoke with , Dr. Wilkes is not in the office today.  Patient was last seen at the end of January.   stated that the patient may use the walk-in hours to seen by a resident at the clinic, from 7:30am to 2pm at [address] [place] [address]; he can be schedule for an appointment with Dr. Wilkes by attending walk in hours.
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
## 2022-04-12 NOTE — DISCHARGE NOTE BEHAVIORAL HEALTH - LEARNING BEHAVIORAL ACTIVITIES TO COPE WITH URGES. FOR EXAMPLE, DISTRACTION AND CHANGING ROUTINES.
Pt c/o 2nd digit finger swelling/pain on right hand onset today.  +limited movement Statement Selected

## 2022-04-12 NOTE — DISCHARGE NOTE BEHAVIORAL HEALTH - MEDICATION SUMMARY - MEDICATIONS TO TAKE
I will START or STAY ON the medications listed below when I get home from the hospital:    QUEtiapine 100 mg oral tablet  -- 1 tab(s) by mouth once a day (at bedtime) x 14 days or until provider tells you otherwise   -- Indication: For Psychosis, unspecified psychosis type    nicotine 14 mg/24 hr transdermal film, extended release  -- 1 application by transdermal patch once a day x 14 days or until provider tells you otherwise   -- Indication: For nicotine cravings

## 2022-04-12 NOTE — PROGRESS NOTE BEHAVIORAL HEALTH - DETAILS
reports CAH to kill others w/o specific plan/intent in the hospital
reports CAH to kill others w/o specific plan/intent in the hospital

## 2022-04-12 NOTE — DISCHARGE NOTE BEHAVIORAL HEALTH - NSBHDCADMRISKMITFT_PSY_A_CORE
Patient will be discharged to continue outpatient mental health services with Dr. Wilkes.  Patient was engaged in group and milieu and supportive therapies while admitted and motivational interviewing was conducted to encourage abstinence and treatment for substance use disorders.

## 2022-04-12 NOTE — PROGRESS NOTE BEHAVIORAL HEALTH - CASE SUMMARY
no psychosis or s/h ideation evident.  Voices understanding of need for compliance with meds and aftercare and sobriety.  Will proceed with d/c planning.

## 2022-04-12 NOTE — DISCHARGE NOTE BEHAVIORAL HEALTH - NSTOBACCOREFERRAL_GEN_A_NCS
Yes Patient registered and referred to the [place] [facility]. Phone appointment scheduled for 4/15/22 at 0900./Yes

## 2022-04-12 NOTE — PROGRESS NOTE BEHAVIORAL HEALTH - PROBLEM SELECTOR PLAN 1
- Start Seroquel 100mg Qhs  - Severe agitation/aggression give Ativan 2 mg PO Q6H PRN or haldol 5 mg PO Q6H PRN for severe agitation not amenable to verbal redirection with escalation to IM if patient refuses PO or becomes a danger to self, others or property.  - f/u UDS, RPR, TSH, lipid panel, a1c
- r/o substance induced psychosis   - No indication for pharmacologic treatment of MDMA, cocaine, or cannabis withdrawal at this time   - c/w Seroquel 100mg Qhs  - Severe agitation/aggression give Ativan 2 mg PO Q6H PRN or haldol 5 mg PO Q6H PRN for severe agitation not amenable to verbal redirection with escalation to IM if patient refuses PO or becomes a danger to self, others or property.  - f/u UDS, RPR, TSH, lipid panel, a1c

## 2022-04-13 VITALS
DIASTOLIC BLOOD PRESSURE: 63 MMHG | RESPIRATION RATE: 16 BRPM | TEMPERATURE: 97 F | SYSTOLIC BLOOD PRESSURE: 119 MMHG | HEART RATE: 52 BPM

## 2022-04-13 LAB — T PALLIDUM AB TITR SER: NEGATIVE — SIGNIFICANT CHANGE UP

## 2022-04-13 RX ORDER — NICOTINE POLACRILEX 2 MG
1 GUM BUCCAL
Qty: 14 | Refills: 0
Start: 2022-04-13 | End: 2022-04-26

## 2022-04-13 RX ORDER — QUETIAPINE FUMARATE 200 MG/1
1 TABLET, FILM COATED ORAL
Qty: 14 | Refills: 0
Start: 2022-04-13 | End: 2022-04-26

## 2022-04-13 NOTE — CHART NOTE - NSCHARTNOTEFT_GEN_A_CORE
Social Work Discharge Note:    Patient is for discharge today.  He is alert and oriented x3.  Mood has improved.  Anxiety has decreased.  Insight and judgment have improved.  Suicidal/homicidal ideation denied.    Patient will be discharged to his apartment on [address].  He will resume treatment with his psychiatrist at [facility].        Patient confirms his cell phone number is [phone]    Patient is aware and agreeable to discharge today

## 2022-04-13 NOTE — CHART NOTE - NSCHARTNOTEFT_GEN_A_CORE
pt d/c'd today in improved state.  No psychosis or s/h ideation. Continued compliance and sobriety stressed.  Prn yesterday noted; due to personality issue/? disorder and not indication of instability or contraindication to d/c.  Pt had no interest in rehab placement.

## 2022-04-15 ENCOUNTER — EMERGENCY (EMERGENCY)
Facility: HOSPITAL | Age: 27
LOS: 0 days | Discharge: HOME | End: 2022-04-16
Attending: STUDENT IN AN ORGANIZED HEALTH CARE EDUCATION/TRAINING PROGRAM | Admitting: STUDENT IN AN ORGANIZED HEALTH CARE EDUCATION/TRAINING PROGRAM
Payer: MEDICAID

## 2022-04-15 VITALS
TEMPERATURE: 98 F | OXYGEN SATURATION: 98 % | SYSTOLIC BLOOD PRESSURE: 109 MMHG | HEART RATE: 75 BPM | HEIGHT: 64 IN | RESPIRATION RATE: 18 BRPM | DIASTOLIC BLOOD PRESSURE: 62 MMHG

## 2022-04-15 DIAGNOSIS — R45.6 VIOLENT BEHAVIOR: ICD-10-CM

## 2022-04-15 DIAGNOSIS — F19.10 OTHER PSYCHOACTIVE SUBSTANCE ABUSE, UNCOMPLICATED: ICD-10-CM

## 2022-04-15 DIAGNOSIS — Z91.14 PATIENT'S OTHER NONCOMPLIANCE WITH MEDICATION REGIMEN: ICD-10-CM

## 2022-04-15 DIAGNOSIS — Z87.891 PERSONAL HISTORY OF NICOTINE DEPENDENCE: ICD-10-CM

## 2022-04-15 DIAGNOSIS — Z88.1 ALLERGY STATUS TO OTHER ANTIBIOTIC AGENTS STATUS: ICD-10-CM

## 2022-04-15 DIAGNOSIS — F31.9 BIPOLAR DISORDER, UNSPECIFIED: ICD-10-CM

## 2022-04-15 DIAGNOSIS — F20.9 SCHIZOPHRENIA, UNSPECIFIED: ICD-10-CM

## 2022-04-15 PROCEDURE — 99284 EMERGENCY DEPT VISIT MOD MDM: CPT

## 2022-04-15 PROCEDURE — 90792 PSYCH DIAG EVAL W/MED SRVCS: CPT
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
## 2022-04-16 VITALS
OXYGEN SATURATION: 98 % | TEMPERATURE: 97 F | DIASTOLIC BLOOD PRESSURE: 64 MMHG | HEART RATE: 63 BPM | SYSTOLIC BLOOD PRESSURE: 128 MMHG | RESPIRATION RATE: 18 BRPM

## 2022-04-16 DIAGNOSIS — F19.10 OTHER PSYCHOACTIVE SUBSTANCE ABUSE, UNCOMPLICATED: ICD-10-CM

## 2022-04-16 PROBLEM — R01.1 CARDIAC MURMUR, UNSPECIFIED: Chronic | Status: ACTIVE | Noted: 2022-04-11

## 2022-04-16 PROBLEM — F31.9 BIPOLAR DISORDER, UNSPECIFIED: Chronic | Status: ACTIVE | Noted: 2022-04-11

## 2022-04-16 LAB
ANION GAP SERPL CALC-SCNC: 12 MMOL/L — SIGNIFICANT CHANGE UP (ref 7–14)
APAP SERPL-MCNC: <5 UG/ML — LOW (ref 10–30)
BASOPHILS # BLD AUTO: 0.04 K/UL — SIGNIFICANT CHANGE UP (ref 0–0.2)
BASOPHILS NFR BLD AUTO: 0.5 % — SIGNIFICANT CHANGE UP (ref 0–1)
BUN SERPL-MCNC: 21 MG/DL — HIGH (ref 10–20)
CALCIUM SERPL-MCNC: 9.8 MG/DL — SIGNIFICANT CHANGE UP (ref 8.5–10.1)
CHLORIDE SERPL-SCNC: 100 MMOL/L — SIGNIFICANT CHANGE UP (ref 98–110)
CO2 SERPL-SCNC: 21 MMOL/L — SIGNIFICANT CHANGE UP (ref 17–32)
CREAT SERPL-MCNC: 0.8 MG/DL — SIGNIFICANT CHANGE UP (ref 0.7–1.5)
EGFR: 125 ML/MIN/1.73M2 — SIGNIFICANT CHANGE UP
EOSINOPHIL # BLD AUTO: 0.27 K/UL — SIGNIFICANT CHANGE UP (ref 0–0.7)
EOSINOPHIL NFR BLD AUTO: 3.3 % — SIGNIFICANT CHANGE UP (ref 0–8)
ETHANOL SERPL-MCNC: <10 MG/DL — SIGNIFICANT CHANGE UP
GLUCOSE SERPL-MCNC: 123 MG/DL — HIGH (ref 70–99)
HCT VFR BLD CALC: 41.9 % — LOW (ref 42–52)
HGB BLD-MCNC: 14.2 G/DL — SIGNIFICANT CHANGE UP (ref 14–18)
IMM GRANULOCYTES NFR BLD AUTO: 0.2 % — SIGNIFICANT CHANGE UP (ref 0.1–0.3)
LYMPHOCYTES # BLD AUTO: 2.07 K/UL — SIGNIFICANT CHANGE UP (ref 1.2–3.4)
LYMPHOCYTES # BLD AUTO: 25.4 % — SIGNIFICANT CHANGE UP (ref 20.5–51.1)
MCHC RBC-ENTMCNC: 28.5 PG — SIGNIFICANT CHANGE UP (ref 27–31)
MCHC RBC-ENTMCNC: 33.9 G/DL — SIGNIFICANT CHANGE UP (ref 32–37)
MCV RBC AUTO: 84 FL — SIGNIFICANT CHANGE UP (ref 80–94)
MONOCYTES # BLD AUTO: 0.58 K/UL — SIGNIFICANT CHANGE UP (ref 0.1–0.6)
MONOCYTES NFR BLD AUTO: 7.1 % — SIGNIFICANT CHANGE UP (ref 1.7–9.3)
NEUTROPHILS # BLD AUTO: 5.16 K/UL — SIGNIFICANT CHANGE UP (ref 1.4–6.5)
NEUTROPHILS NFR BLD AUTO: 63.5 % — SIGNIFICANT CHANGE UP (ref 42.2–75.2)
NRBC # BLD: 0 /100 WBCS — SIGNIFICANT CHANGE UP (ref 0–0)
PLATELET # BLD AUTO: 307 K/UL — SIGNIFICANT CHANGE UP (ref 130–400)
POTASSIUM SERPL-MCNC: 4.2 MMOL/L — SIGNIFICANT CHANGE UP (ref 3.5–5)
POTASSIUM SERPL-SCNC: 4.2 MMOL/L — SIGNIFICANT CHANGE UP (ref 3.5–5)
RBC # BLD: 4.99 M/UL — SIGNIFICANT CHANGE UP (ref 4.7–6.1)
RBC # FLD: 13.2 % — SIGNIFICANT CHANGE UP (ref 11.5–14.5)
SALICYLATES SERPL-MCNC: <0.3 MG/DL — LOW (ref 4–30)
SODIUM SERPL-SCNC: 133 MMOL/L — LOW (ref 135–146)
WBC # BLD: 8.14 K/UL — SIGNIFICANT CHANGE UP (ref 4.8–10.8)
WBC # FLD AUTO: 8.14 K/UL — SIGNIFICANT CHANGE UP (ref 4.8–10.8)

## 2022-04-16 NOTE — ED BEHAVIORAL HEALTH ASSESSMENT NOTE - VIOLENCE PROTECTIVE FACTORS:
Residential stability/Employment stability/Insight into violence risk and need for management/treatment/Good treatment response/compliance
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
## 2022-04-16 NOTE — ED BEHAVIORAL HEALTH ASSESSMENT NOTE - CASE SUMMARY
25yo  male, originally from [place], [place], has been living in [place] for more than a year, living with roommate in [place], currently employed as  in [employer], unclear past psychiatric history but documented reported psychiatric history of borderline autism spectrum disorder, PTSD, bipolar disorder, polysubstance use (alcohol, cocaine, MJ, ecstasy), multiple prior inpatient psychiatry admissions in the setting of substance intoxication, last at [facility] x 1 day for "violent stuff" in context of drug use brought in by self for having violent ideations. Psychiatry consulted for mental health evaluation.  Upon interview, Pt was able to report a reliable and consistent history. He was able to demonstrate through showing MD his text messages that his conflict with roommate was resolving. He adamantly denied any thought of violence toward himself or anyone else. He denied access to weapon. He reported plans for avoiding violence and demonstrated a desire to avoid violence by going to the ED last night. He does not meet criteria for involuntary psychiatric admission and does not wish to be admitted to [facility] at this time. Safety plan was discussed.

## 2022-04-16 NOTE — ED ADULT NURSE NOTE - OBJECTIVE STATEMENT
pt presents to the ed for psych evaluation  pt has hx of bipolar disorder, substance abuse, schizophrenia non compliant with medications;   pt is a poor historian with rambling though/ideas and quickly turns aggressive;   1:1 initiated for pt safety
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
## 2022-04-16 NOTE — ED ADULT NURSE NOTE - HISTORY OF COVID-19 VACCINATION
Vaccine status unknown
the patient and the orthopedic resident.
the patient and the orthopedic resident

## 2022-04-16 NOTE — ED PROVIDER NOTE - CLINICAL SUMMARY MEDICAL DECISION MAKING FREE TEXT BOX
.    Pt w/ aggressive thoughts. Seen and cleared by psychiatry. Pt stable for dc w/ his own psychiatrist. dc home.    .
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
## 2022-04-16 NOTE — ED PROVIDER NOTE - NS ED ROS FT
Constitutional: No fever, chills.  Eyes: No visual changes.  ENT: No hearing changes.  Neck: No neck pain or stiffness.  Cardiovascular: No chest pain, palpitations, edema.  Pulmonary: No SOB, cough. No hemoptysis.  Abdominal:  No nausea, vomiting, diarrhea.  : No dysuria, frequency.  Neuro: No headache, syncope, dizziness.  MS: No back pain. No calf pain/swelling.  Psych: seehpi

## 2022-04-16 NOTE — ED BEHAVIORAL HEALTH ASSESSMENT NOTE - DESCRIPTION
25yo  male, originally from [place], [place], has been living in [place] for more than a year, living with roommate in [place] apartment, currently employed as  in [employer] Patient presented to ED, placed on 1:1, labs ordered, psychiatry consulted. na

## 2022-04-16 NOTE — ED BEHAVIORAL HEALTH ASSESSMENT NOTE - REFERRAL / APPOINTMENT DETAILS
Dr. Wilkes at [facility], [address] [place] [address], [phone], on 19-Apr-2022 09:00
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
## 2022-04-16 NOTE — ED PROVIDER NOTE - PATIENT PORTAL LINK FT
You can access the FollowMyHealth Patient Portal offered by [facility] by registering at the following website: [email or web address]. By joining [employer]’s FollowMyHealth portal, you will also be able to view your health information using other applications (apps) compatible with our system.

## 2022-04-16 NOTE — ED PROVIDER NOTE - NS ED ATTENDING STATEMENT MOD
This was a shared visit with the ORLY. I reviewed and verified the documentation and independently performed the documented:

## 2022-04-16 NOTE — ED PROVIDER NOTE - NSFOLLOWUPCLINICS_GEN_ALL_ED_FT
[facility]  OP Mental Health  [address] [place] [id number]  Phone: [phone]  Fax:

## 2022-04-16 NOTE — ED PROVIDER NOTE - PHYSICAL EXAMINATION
Constitutional: Well developed, well nourished. NAD  Head: Normocephalic, atraumatic.  Eyes: PERRL, EOMI.  ENT: No nasal discharge. Mucous membranes dry.  Neck: Supple. Painless ROM.  Cardiovascular:   Regular rate and rhythm.   Pulmonary:   Lungs clear to auscultation bilaterally.    Abdominal: Soft. Nondistended. No rebound, guarding, rigidity.  Extremities. Pelvis stable. No lower extremity edema, symmetric calves.  Skin: No rashes, cyanosis.  Neuro: AAOx3. No focal neurological deficits.  Psych: pt with rambling thoughts, easily agitated - got upset when I told him we need to check alcohol level prior to psych eval;
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
## 2022-04-16 NOTE — ED PROVIDER NOTE - ATTENDING CONTRIBUTION TO CARE
26-year-old male with PMH bipolar, schizophrenia, substance abuse presents complaining of homicidal thoughts towards roommate.  Patient states he has not been compliant with his medications recently.  Denies any suicidal ideations. 26-year-old male with PMH bipolar, schizophrenia, substance abuse presents complaining of homicidal thoughts towards roommate.  Patient states he has not been compliant with his medications recently.  Denies any suicidal ideations.    VITAL SIGNS: noted  CONSTITUTIONAL: Well-developed; well-nourished; in no acute distress  HEAD: Normocephalic; atraumatic  EYES: PERRL, EOM intact; conjunctiva and sclera clear  ENT: No nasal discharge; airway clear. MMM  NECK: Supple; non tender.    CARD: S1, S2 normal; no murmurs, gallops, or rubs. Regular rate and rhythm  RESP: CTAB/L, no wheezes, rales or rhonchi  ABD: Normal bowel sounds; soft; non-distended; non-tender   EXT: Normal ROM. No calf tenderness or edema. Distal pulses intact  NEURO: Alert, oriented. Grossly unremarkable. No focal deficits  SKIN: Skin exam is warm and dry

## 2022-04-16 NOTE — ED BEHAVIORAL HEALTH ASSESSMENT NOTE - NSSUICPROTFACT_PSY_ALL_CORE
Identifies reasons for living/Supportive social network of family or friends/Cultural, spiritual and/or moral attitudes against suicide/Engaged in work or school/Positive therapeutic relationships/Ability to cope with stress

## 2022-04-16 NOTE — ED PROVIDER NOTE - PROGRESS NOTE DETAILS
case d/w telepsych Dr. Machado; pt to be evaluated by day psych team; Pt s/o to Dr. Gold to follow up psychiatry consult,  reassess and dispo. Pt cleared from psych -baer
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
## 2022-04-18 DIAGNOSIS — F43.10 POST-TRAUMATIC STRESS DISORDER, UNSPECIFIED: ICD-10-CM

## 2022-04-18 DIAGNOSIS — F29 UNSPECIFIED PSYCHOSIS NOT DUE TO A SUBSTANCE OR KNOWN PHYSIOLOGICAL CONDITION: ICD-10-CM

## 2022-04-18 DIAGNOSIS — F17.200 NICOTINE DEPENDENCE, UNSPECIFIED, UNCOMPLICATED: ICD-10-CM

## 2022-04-18 DIAGNOSIS — F12.10 CANNABIS ABUSE, UNCOMPLICATED: ICD-10-CM

## 2022-04-18 DIAGNOSIS — F31.9 BIPOLAR DISORDER, UNSPECIFIED: ICD-10-CM

## 2022-04-18 DIAGNOSIS — F84.0 AUTISTIC DISORDER: ICD-10-CM

## 2022-04-18 DIAGNOSIS — F10.10 ALCOHOL ABUSE, UNCOMPLICATED: ICD-10-CM

## 2022-04-18 DIAGNOSIS — R45.850 HOMICIDAL IDEATIONS: ICD-10-CM

## 2022-04-18 DIAGNOSIS — F14.120 COCAINE ABUSE WITH INTOXICATION, UNCOMPLICATED: ICD-10-CM

## 2022-04-18 DIAGNOSIS — R45.851 SUICIDAL IDEATIONS: ICD-10-CM

## 2022-04-18 DIAGNOSIS — F15.10 OTHER STIMULANT ABUSE, UNCOMPLICATED: ICD-10-CM

## 2022-04-18 DIAGNOSIS — F16.120 HALLUCINOGEN ABUSE WITH INTOXICATION, UNCOMPLICATED: ICD-10-CM

## 2022-04-19 ENCOUNTER — EMERGENCY (EMERGENCY)
Facility: HOSPITAL | Age: 27
LOS: 0 days | Discharge: HOME | End: 2022-04-20
Attending: EMERGENCY MEDICINE | Admitting: EMERGENCY MEDICINE
Payer: MEDICAID

## 2022-04-19 VITALS
HEART RATE: 87 BPM | TEMPERATURE: 97 F | SYSTOLIC BLOOD PRESSURE: 134 MMHG | WEIGHT: 149.91 LBS | OXYGEN SATURATION: 98 % | RESPIRATION RATE: 18 BRPM | DIASTOLIC BLOOD PRESSURE: 66 MMHG | HEIGHT: 64 IN

## 2022-04-19 DIAGNOSIS — Q21.1 ATRIAL SEPTAL DEFECT: ICD-10-CM

## 2022-04-19 DIAGNOSIS — R45.4 IRRITABILITY AND ANGER: ICD-10-CM

## 2022-04-19 DIAGNOSIS — F31.9 BIPOLAR DISORDER, UNSPECIFIED: ICD-10-CM

## 2022-04-19 DIAGNOSIS — F84.0 AUTISTIC DISORDER: ICD-10-CM

## 2022-04-19 DIAGNOSIS — F12.10 CANNABIS ABUSE, UNCOMPLICATED: ICD-10-CM

## 2022-04-19 DIAGNOSIS — R45.6 VIOLENT BEHAVIOR: ICD-10-CM

## 2022-04-19 DIAGNOSIS — F20.9 SCHIZOPHRENIA, UNSPECIFIED: ICD-10-CM

## 2022-04-19 DIAGNOSIS — Z88.1 ALLERGY STATUS TO OTHER ANTIBIOTIC AGENTS STATUS: ICD-10-CM

## 2022-04-19 PROCEDURE — 99285 EMERGENCY DEPT VISIT HI MDM: CPT
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
## 2022-04-20 DIAGNOSIS — F84.0 AUTISTIC DISORDER: ICD-10-CM

## 2022-04-20 LAB
ANION GAP SERPL CALC-SCNC: 11 MMOL/L — SIGNIFICANT CHANGE UP (ref 7–14)
APAP SERPL-MCNC: <5 UG/ML — LOW (ref 10–30)
APPEARANCE UR: CLEAR — SIGNIFICANT CHANGE UP
BASOPHILS # BLD AUTO: 0.04 K/UL — SIGNIFICANT CHANGE UP (ref 0–0.2)
BASOPHILS NFR BLD AUTO: 0.5 % — SIGNIFICANT CHANGE UP (ref 0–1)
BILIRUB UR-MCNC: NEGATIVE — SIGNIFICANT CHANGE UP
BUN SERPL-MCNC: 22 MG/DL — HIGH (ref 10–20)
CALCIUM SERPL-MCNC: 9.9 MG/DL — SIGNIFICANT CHANGE UP (ref 8.5–10.1)
CHLORIDE SERPL-SCNC: 103 MMOL/L — SIGNIFICANT CHANGE UP (ref 98–110)
CO2 SERPL-SCNC: 24 MMOL/L — SIGNIFICANT CHANGE UP (ref 17–32)
COLOR SPEC: YELLOW — SIGNIFICANT CHANGE UP
CREAT SERPL-MCNC: 0.7 MG/DL — SIGNIFICANT CHANGE UP (ref 0.7–1.5)
DIFF PNL FLD: NEGATIVE — SIGNIFICANT CHANGE UP
EGFR: 130 ML/MIN/1.73M2 — SIGNIFICANT CHANGE UP
EOSINOPHIL # BLD AUTO: 0.16 K/UL — SIGNIFICANT CHANGE UP (ref 0–0.7)
EOSINOPHIL NFR BLD AUTO: 2 % — SIGNIFICANT CHANGE UP (ref 0–8)
ETHANOL SERPL-MCNC: <10 MG/DL — SIGNIFICANT CHANGE UP
GLUCOSE SERPL-MCNC: 111 MG/DL — HIGH (ref 70–99)
GLUCOSE UR QL: NEGATIVE — SIGNIFICANT CHANGE UP
HCT VFR BLD CALC: 43.3 % — SIGNIFICANT CHANGE UP (ref 42–52)
HGB BLD-MCNC: 14.3 G/DL — SIGNIFICANT CHANGE UP (ref 14–18)
IMM GRANULOCYTES NFR BLD AUTO: 0.3 % — SIGNIFICANT CHANGE UP (ref 0.1–0.3)
KETONES UR-MCNC: NEGATIVE — SIGNIFICANT CHANGE UP
LEUKOCYTE ESTERASE UR-ACNC: NEGATIVE — SIGNIFICANT CHANGE UP
LYMPHOCYTES # BLD AUTO: 2.01 K/UL — SIGNIFICANT CHANGE UP (ref 1.2–3.4)
LYMPHOCYTES # BLD AUTO: 25.2 % — SIGNIFICANT CHANGE UP (ref 20.5–51.1)
MCHC RBC-ENTMCNC: 27.8 PG — SIGNIFICANT CHANGE UP (ref 27–31)
MCHC RBC-ENTMCNC: 33 G/DL — SIGNIFICANT CHANGE UP (ref 32–37)
MCV RBC AUTO: 84.2 FL — SIGNIFICANT CHANGE UP (ref 80–94)
MONOCYTES # BLD AUTO: 0.55 K/UL — SIGNIFICANT CHANGE UP (ref 0.1–0.6)
MONOCYTES NFR BLD AUTO: 6.9 % — SIGNIFICANT CHANGE UP (ref 1.7–9.3)
NEUTROPHILS # BLD AUTO: 5.2 K/UL — SIGNIFICANT CHANGE UP (ref 1.4–6.5)
NEUTROPHILS NFR BLD AUTO: 65.1 % — SIGNIFICANT CHANGE UP (ref 42.2–75.2)
NITRITE UR-MCNC: NEGATIVE — SIGNIFICANT CHANGE UP
NRBC # BLD: 0 /100 WBCS — SIGNIFICANT CHANGE UP (ref 0–0)
PH UR: 7 — SIGNIFICANT CHANGE UP (ref 5–8)
PLATELET # BLD AUTO: 342 K/UL — SIGNIFICANT CHANGE UP (ref 130–400)
POTASSIUM SERPL-MCNC: 4.1 MMOL/L — SIGNIFICANT CHANGE UP (ref 3.5–5)
POTASSIUM SERPL-SCNC: 4.1 MMOL/L — SIGNIFICANT CHANGE UP (ref 3.5–5)
PROT UR-MCNC: SIGNIFICANT CHANGE UP
RBC # BLD: 5.14 M/UL — SIGNIFICANT CHANGE UP (ref 4.7–6.1)
RBC # FLD: 12.9 % — SIGNIFICANT CHANGE UP (ref 11.5–14.5)
SALICYLATES SERPL-MCNC: <0.3 MG/DL — LOW (ref 4–30)
SODIUM SERPL-SCNC: 138 MMOL/L — SIGNIFICANT CHANGE UP (ref 135–146)
SP GR SPEC: 1.03 — SIGNIFICANT CHANGE UP (ref 1.01–1.03)
UROBILINOGEN FLD QL: SIGNIFICANT CHANGE UP
WBC # BLD: 7.98 K/UL — SIGNIFICANT CHANGE UP (ref 4.8–10.8)
WBC # FLD AUTO: 7.98 K/UL — SIGNIFICANT CHANGE UP (ref 4.8–10.8)

## 2022-04-20 PROCEDURE — 93010 ELECTROCARDIOGRAM REPORT: CPT

## 2022-04-20 PROCEDURE — 90792 PSYCH DIAG EVAL W/MED SRVCS: CPT | Mod: 95

## 2022-04-20 NOTE — ED PROVIDER NOTE - PATIENT PORTAL LINK FT
You can access the FollowMyHealth Patient Portal offered by [facility] by registering at the following website: [email or web address]. By joining [employer]’s FollowMyHealth portal, you will also be able to view your health information using other applications (apps) compatible with our system.
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
## 2022-04-20 NOTE — ED PROVIDER NOTE - NSFOLLOWUPINSTRUCTIONS_ED_ALL_ED_FT
**Follow up with outpatient psych clinic and detox program**    Substance Use Disorder  Substance use disorder happens when a person's repeated use of drugs or alcohol interferes with his or her ability to be productive. This disorder can cause problems with your mental and physical health. It can affect your ability to have healthy relationships, and it can keep you from being able to meet your responsibilities at work, home, or school. It can also lead to addiction.    The most commonly abused substances include:    Alcohol.  Tobacco.  Marijuana.  Stimulants, such as cocaine and methamphetamine.  Hallucinogens, such as LSD and PCP.  Opioids, such as some prescription pain medicines and heroin.    What are the causes?  This condition may develop due to social, psychological, or physical reasons. Causes include:    Stress.  Abuse.  Peer pressure.  Anxiety.  Depression.    What increases the risk?  This condition is more likely to develop in people who:    Are stressed.  Have been abused.  Have a mental health disorder, such as depression.  Are born with certain genes.    What are the signs or symptoms?  Symptoms of this condition include:    Using the substance for longer periods of time or at a higher dosage than what is normal or intended.  Having a lasting desire to use the substance.  Being unable to slow down or stop your use of the substance.  Spending an abnormal amount of time seeking the substance, using the substance, or recovering from using the substance.  Craving the substance.  Substance use that:    Interferes with your work, school, or home life.  Interferes with your personal and social relationships.  Makes you give up activities that you once enjoyed or found important.    Using the substance even though:    You know it is dangerous or bad for your health.  You know it is causing problems in your life.    Needing more and more of the substance to get the same effect (developing tolerance).  Experiencing physical symptoms if you do not use the substance (withdrawal).  Using the substance to avoid withdrawal.    How is this diagnosed?  This condition may be diagnosed based on:    Your history of substance use.  The way in which substance abuse affects your life.  Having at least two symptoms of substance use disorder within a 12-month period.    Your health care provider may also test your blood and urine for alcohol and drugs.    How is this treated?  ImageThis condition may be treated by:    Stopping substance use safely. This may require taking medicines and being closely observed for several days.  Taking part in group and individual counseling from mental health providers who help people with substance use disorder.  Staying at a residential treatment center for several days or weeks.  Attending daily counseling sessions at a treatment center.  Taking medicine as told by your health care provider:    To ease symptoms and prevent complications during withdrawal.  To treat other mental health issues, such as depression or anxiety.  To block cravings by causing the same effects as the substance.  To block the effects of the substance or replace good sensations with unpleasant ones.    Going to a support group to share your experience with others who are going through the same thing. These groups are an important part of long-term recovery for many people. They include 12-step programs like Alcoholics Anonymous and Narcotics Anonymous.    Recovery can be a long process. Many people who undergo treatment start using the substance again after stopping. This is called a relapse. If you have a relapse, that does not mean that treatment will not work.    Follow these instructions at home:  Take over-the-counter and prescription medicines only as told by your health care provider.  Do not use any drugs or alcohol.  Keep all follow-up visits as told by your health care provider. This is important. This includes continuing to work with therapists, health care providers, and support groups.  Contact a health care provider if:  You cannot take your medicines as told.  Your symptoms get worse.  You have trouble resisting the urge to use drugs or alcohol.  Get help right away if:  You have serious thoughts about hurting yourself or someone else.  You have a relapse.  This information is not intended to replace advice given to you by your health care provider. Make sure you discuss any questions you have with your health care provider.    Please check this website for help finding local Buprenorphine (Suboxone) providers or to find out if your PMD can prescribe Buprenorphine (Suboxone).  https://www.samhsa.gov/medication-assisted-treatment/practitioner-program-data/treatment-practitioner-
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
## 2022-04-20 NOTE — ED BEHAVIORAL HEALTH ASSESSMENT NOTE - DESCRIPTION
na Patient presented to ED, placed on 1:1, labs ordered, psychiatry consulted. 27yo  male, originally from [place], [place], has been living in [place] for more than a year, living with roommate in prive apartment, currently employed as  in [employer] Patient presented to ED, placed on 1:1, labs ordered, psychiatry consulted.    COVID Exposure Screen- Patient  1. *Have you had a COVID-19 test in the last 90 days? ( ) Yes (  ) No (x  ) Unknown- Reason: _____  3. *Have you tested positive for COVID-19 antibodies? ( ) Yes (  ) No ( x ) Unknown- Reason: _____  5. *Have you received 2 doses of the COVID-19 vaccine? ( ) Yes ( ) No ( x) Unknown- Reason: _____  7. *In the past 10 days, have you been around anyone with a positive COVID-19 test?* ( ) Yes (  ) No (x ) Unknown- Reason: ____  13. *Have you been out of [place] within the past 10 days?* ( ) Yes ( ) No (x  ) Unknown- Reason: _____ see hpi
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
## 2022-04-20 NOTE — ED PROVIDER NOTE - NS ED ROS FT
Constitutional: (-) fever (-) malaise (-) diaphoresis (-) chills   Eyes: (-) visual changes (-) eye pain (-) eye discharge (-) photophobia (-) FB sensation  Cardiac: (-) chest pain  (-) palpitations (-) syncope (-) edema  Respiratory: (-) cough (-) SOB (-) [place]CK[place]  GI: (-) nausea (-) vomiting (-) diarrhea (-) abdominal pain  : (-) dysuria (-) increased frequency  (-) hematuria (-) incontinence  MS: (-) back pain (-) myalgia (-) muscle weakness (-)  joint pain  Neuro: (-) headache (-) dizziness (-) numbness/tingling to extremities B/L (-) weakness  Skin: (-) rash (-) laceration  Psychiatric: (-) hallucinations (-) SI (+)HI (-) intoxication    Except as documented in the HPI, all other systems are negative.

## 2022-04-20 NOTE — ED ADULT NURSE NOTE - NS ED NURSE DISCH DISPOSITION
Test is scheduled for [address] [place] [address] registration and testing location on 4/3/19. Discharged

## 2022-04-20 NOTE — ED BEHAVIORAL HEALTH ASSESSMENT NOTE - SUMMARY
27yo  male, originally from [place], [place], has been living in [place] for more than a year, living with roommate in prive apartment, currently employed as  in [employer], unclear past psychiatric history but documented reported psychiatric history of borderline autism spectrum disorder, PTSD, bipolar disorder, polysubstance use (alcohol, cocaine, MJ, ecstasy), multiple prior inpatient psychiatry admissions in the setting of substance intoxication, last at [facility] x 1 day for "violent stuff" in context of drug use brought in by self for having violent ideations. Psychiatry consulted for mental health evaluation.    s. Patient was not depressed, was not psychotic, was not manic and does not meet criteria for involuntary psychiatric admission. 25yo  male, originally from [place], [place], has been living in [place] for more than a year, living with roommate in prive apartment, currently employed as  in [employer], unclear past psychiatric history but documented reported psychiatric history of borderline autism spectrum disorder, PTSD, bipolar disorder, polysubstance use (alcohol, cocaine, MJ, ecstasy), multiple prior inpatient psychiatry admissions in the setting of substance intoxication, last at [facility] x 1 day for "violent stuff" in context of drug use brought in by self for having violent ideations. Psychiatry consulted for mental health evaluation.    Patient was not depressed, was not psychotic, was not manic and does not meet criteria for involuntary psychiatric admission. 25yo  male, originally from [place], [place], has been living in [place] for more than a year, living with roommate in prive apartment, currently employed as  in [employer], unclear past psychiatric history but documented reported psychiatric history of borderline autism spectrum disorder, PTSD, bipolar disorder, polysubstance use (alcohol, cocaine, MJ, ecstasy), multiple prior inpatient psychiatry admissions in the setting of substance intoxication, last at [facility] x 1 day for "violent stuff" in context of drug use brought in by self for having violent ideations. Psychiatry consulted for mental health evaluation.    Patient presents with chronic daily violent ideations and recent substance use. Pt was seen on 4/15 and cleared; these behaviors appear chronic in nature d/t low frustration tolerance, cognitive limitations, and contributing substance abuse. Appears to be at baseline and not acutely psychotic or manic. Cleared psychiatrically, refer to SW for substance abuse tx resources

## 2022-04-20 NOTE — ED PROVIDER NOTE - NSFOLLOWUPCLINICS_GEN_ALL_ED_FT
[facility]  Detox Mgmt  [address] [place] [address]  Phone: [phone]  Fax:   Follow Up Time: 1-3 Days    [facility]  OP Mental Health  [address] [place] [address]  Phone: [phone]  Fax:   Follow Up Time: 1-3 Days
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
## 2022-04-20 NOTE — ED PROVIDER NOTE - PHYSICAL EXAMINATION
GENERAL: Well-nourished, Well-developed. NAD.  HEAD: No visible or palpable bumps or hematomas. No ecchymosis behind ears B/L.  Eyes: PERRLA, EOMI. No asymmetry. No nystagmus. No conjunctival injection. Non-icteric sclera.  ENMT: MMM.   Neck: Supple. FROM  CVS: RRR. Normal S1,S2. No murmurs appreciated on auscultation   RESP: Lungs clear to auscultation B/L. No wheezing, rales, or rhonchi auscultated.  Skin: Warm, Dry. No rashes or lesions. Good cap refill < 2 sec B/L.  Psych:  Appropriate mood and affect. Cooperative. Calm

## 2022-04-20 NOTE — ED BEHAVIORAL HEALTH ASSESSMENT NOTE - SAFETY PLAN ADDT'L DETAILS
Education provided regarding environmental safety / lethal means restriction/Provision of National Suicide Prevention Lifeline 8-120-315-TALK (0325)

## 2022-04-23 LAB
CARBOXYTHC UR CFM-MCNC: 251 NG/ML — SIGNIFICANT CHANGE UP
CARBOXYTHC UR QL SCN: 127.7 MG/DL — SIGNIFICANT CHANGE UP
CARBOXYTHC UR QL SCN: 197 — SIGNIFICANT CHANGE UP
THC CREATININE URINE: 127.7 MG/DL — SIGNIFICANT CHANGE UP
THC METABOLITE/CREAT URINE: 197 — SIGNIFICANT CHANGE UP

## 2022-04-29 ENCOUNTER — EMERGENCY (EMERGENCY)
Facility: HOSPITAL | Age: 27
LOS: 0 days | Discharge: HOME | End: 2022-04-29
Attending: EMERGENCY MEDICINE | Admitting: EMERGENCY MEDICINE
Payer: MEDICAID

## 2022-04-29 ENCOUNTER — INPATIENT (INPATIENT)
Facility: HOSPITAL | Age: 27
LOS: 3 days | Discharge: HOME | End: 2022-05-03
Attending: PSYCHIATRY & NEUROLOGY | Admitting: PSYCHIATRY & NEUROLOGY
Payer: MEDICAID

## 2022-04-29 VITALS
HEART RATE: 71 BPM | RESPIRATION RATE: 18 BRPM | SYSTOLIC BLOOD PRESSURE: 128 MMHG | TEMPERATURE: 97 F | WEIGHT: 169.98 LBS | OXYGEN SATURATION: 98 % | DIASTOLIC BLOOD PRESSURE: 63 MMHG | HEIGHT: 64 IN

## 2022-04-29 VITALS
SYSTOLIC BLOOD PRESSURE: 119 MMHG | RESPIRATION RATE: 18 BRPM | OXYGEN SATURATION: 99 % | TEMPERATURE: 98 F | HEART RATE: 69 BPM | DIASTOLIC BLOOD PRESSURE: 57 MMHG

## 2022-04-29 VITALS
TEMPERATURE: 98 F | SYSTOLIC BLOOD PRESSURE: 109 MMHG | OXYGEN SATURATION: 98 % | RESPIRATION RATE: 18 BRPM | DIASTOLIC BLOOD PRESSURE: 69 MMHG | HEART RATE: 78 BPM

## 2022-04-29 DIAGNOSIS — F19.10 OTHER PSYCHOACTIVE SUBSTANCE ABUSE, UNCOMPLICATED: ICD-10-CM

## 2022-04-29 DIAGNOSIS — Z88.1 ALLERGY STATUS TO OTHER ANTIBIOTIC AGENTS STATUS: ICD-10-CM

## 2022-04-29 DIAGNOSIS — F31.9 BIPOLAR DISORDER, UNSPECIFIED: ICD-10-CM

## 2022-04-29 DIAGNOSIS — R45.851 SUICIDAL IDEATIONS: ICD-10-CM

## 2022-04-29 DIAGNOSIS — F19.94 OTHER PSYCHOACTIVE SUBSTANCE USE, UNSPECIFIED WITH PSYCHOACTIVE SUBSTANCE-INDUCED MOOD DISORDER: ICD-10-CM

## 2022-04-29 DIAGNOSIS — Z87.891 PERSONAL HISTORY OF NICOTINE DEPENDENCE: ICD-10-CM

## 2022-04-29 DIAGNOSIS — F20.9 SCHIZOPHRENIA, UNSPECIFIED: ICD-10-CM

## 2022-04-29 LAB
ANION GAP SERPL CALC-SCNC: 11 MMOL/L — SIGNIFICANT CHANGE UP (ref 7–14)
APAP SERPL-MCNC: <5 UG/ML — LOW (ref 10–30)
BASOPHILS # BLD AUTO: 0.04 K/UL — SIGNIFICANT CHANGE UP (ref 0–0.2)
BASOPHILS NFR BLD AUTO: 0.5 % — SIGNIFICANT CHANGE UP (ref 0–1)
BUN SERPL-MCNC: 13 MG/DL — SIGNIFICANT CHANGE UP (ref 10–20)
CALCIUM SERPL-MCNC: 9.7 MG/DL — SIGNIFICANT CHANGE UP (ref 8.5–10.1)
CHLORIDE SERPL-SCNC: 99 MMOL/L — SIGNIFICANT CHANGE UP (ref 98–110)
CO2 SERPL-SCNC: 22 MMOL/L — SIGNIFICANT CHANGE UP (ref 17–32)
CREAT SERPL-MCNC: 0.8 MG/DL — SIGNIFICANT CHANGE UP (ref 0.7–1.5)
EGFR: 125 ML/MIN/1.73M2 — SIGNIFICANT CHANGE UP
EOSINOPHIL # BLD AUTO: 0.15 K/UL — SIGNIFICANT CHANGE UP (ref 0–0.7)
EOSINOPHIL NFR BLD AUTO: 1.8 % — SIGNIFICANT CHANGE UP (ref 0–8)
ETHANOL SERPL-MCNC: <10 MG/DL — SIGNIFICANT CHANGE UP
GLUCOSE SERPL-MCNC: 96 MG/DL — SIGNIFICANT CHANGE UP (ref 70–99)
HCT VFR BLD CALC: 43.8 % — SIGNIFICANT CHANGE UP (ref 42–52)
HGB BLD-MCNC: 14.2 G/DL — SIGNIFICANT CHANGE UP (ref 14–18)
IMM GRANULOCYTES NFR BLD AUTO: 0.2 % — SIGNIFICANT CHANGE UP (ref 0.1–0.3)
LYMPHOCYTES # BLD AUTO: 2.01 K/UL — SIGNIFICANT CHANGE UP (ref 1.2–3.4)
LYMPHOCYTES # BLD AUTO: 24.1 % — SIGNIFICANT CHANGE UP (ref 20.5–51.1)
MCHC RBC-ENTMCNC: 27.2 PG — SIGNIFICANT CHANGE UP (ref 27–31)
MCHC RBC-ENTMCNC: 32.4 G/DL — SIGNIFICANT CHANGE UP (ref 32–37)
MCV RBC AUTO: 83.9 FL — SIGNIFICANT CHANGE UP (ref 80–94)
MONOCYTES # BLD AUTO: 0.48 K/UL — SIGNIFICANT CHANGE UP (ref 0.1–0.6)
MONOCYTES NFR BLD AUTO: 5.8 % — SIGNIFICANT CHANGE UP (ref 1.7–9.3)
NEUTROPHILS # BLD AUTO: 5.64 K/UL — SIGNIFICANT CHANGE UP (ref 1.4–6.5)
NEUTROPHILS NFR BLD AUTO: 67.6 % — SIGNIFICANT CHANGE UP (ref 42.2–75.2)
NRBC # BLD: 0 /100 WBCS — SIGNIFICANT CHANGE UP (ref 0–0)
PLATELET # BLD AUTO: 312 K/UL — SIGNIFICANT CHANGE UP (ref 130–400)
POTASSIUM SERPL-MCNC: 3.9 MMOL/L — SIGNIFICANT CHANGE UP (ref 3.5–5)
POTASSIUM SERPL-SCNC: 3.9 MMOL/L — SIGNIFICANT CHANGE UP (ref 3.5–5)
RBC # BLD: 5.22 M/UL — SIGNIFICANT CHANGE UP (ref 4.7–6.1)
RBC # FLD: 12.9 % — SIGNIFICANT CHANGE UP (ref 11.5–14.5)
SALICYLATES SERPL-MCNC: <0.3 MG/DL — LOW (ref 4–30)
SARS-COV-2 RNA SPEC QL NAA+PROBE: SIGNIFICANT CHANGE UP
SODIUM SERPL-SCNC: 132 MMOL/L — LOW (ref 135–146)
WBC # BLD: 8.34 K/UL — SIGNIFICANT CHANGE UP (ref 4.8–10.8)
WBC # FLD AUTO: 8.34 K/UL — SIGNIFICANT CHANGE UP (ref 4.8–10.8)

## 2022-04-29 PROCEDURE — 99285 EMERGENCY DEPT VISIT HI MDM: CPT

## 2022-04-29 PROCEDURE — 93010 ELECTROCARDIOGRAM REPORT: CPT

## 2022-04-29 PROCEDURE — 90792 PSYCH DIAG EVAL W/MED SRVCS: CPT | Mod: GC

## 2022-04-29 RX ORDER — CHLORPROMAZINE HCL 10 MG
50 TABLET ORAL AT BEDTIME
Refills: 0 | Status: DISCONTINUED | OUTPATIENT
Start: 2022-04-30 | End: 2022-05-02

## 2022-04-29 RX ORDER — CHLORPROMAZINE HCL 10 MG
50 TABLET ORAL EVERY 6 HOURS
Refills: 0 | Status: DISCONTINUED | OUTPATIENT
Start: 2022-04-30 | End: 2022-05-02

## 2022-04-29 RX ORDER — DIPHENHYDRAMINE HCL 50 MG
50 CAPSULE ORAL EVERY 6 HOURS
Refills: 0 | Status: DISCONTINUED | OUTPATIENT
Start: 2022-04-30 | End: 2022-05-02

## 2022-04-29 NOTE — ED ADULT NURSE REASSESSMENT NOTE - NS ED NURSE REASSESS COMMENT FT1
pt endorses suicidal/homocidal ideations. pt calm and cooperative. 1:1 at bedside. Clothing removed and belongings with security.

## 2022-04-29 NOTE — ED PROVIDER NOTE - CLINICAL SUMMARY MEDICAL DECISION MAKING FREE TEXT BOX
I personally evaluated the patient. I reviewed the Resident´s or Physician Assistant´s note (as assigned above), and agree with the findings and plan except as documented in my note.  Patient evaluated for psychiatric evaluation.  Labs performed in the ED, psychiatry reconsulted.  Discussed with psychiatry team, plan for inpatient psychiatric admission.  Patient admitted to psychiatry for further evaluation and treatment.

## 2022-04-29 NOTE — ED BEHAVIORAL HEALTH ASSESSMENT NOTE - SUMMARY
Mr. Gracia is a 26-year-old  male, originally from [place], [place], employed, living w/ uncle or friend vs roommate in [place], , no dependents, with reported psychiatric history of suspected autism spectrum disorder, PTSD, bipolar disorder and substance use disorder (alcohol, cocaine, cannabis, ecstasy), multiple prior inpatient psychiatry admissions, in the setting of substance intoxication and overdose, most recent hospitalization was 4/11/22-4/13/22 at [facility]; currently in outpt tx (reports next appointment in June), unclear past suicide attempts vs accidental overdose, presenting to the ED brought in by ambulance following making death threats to others. Psychiatry consulted for mental health evaluation.     Mr. Stinson's presentation appears precipitated by acute social stressors in the context of chronic predisposing factors including cognitive limitations, limited social engagement, and trauma, and perpetuating factors include substance use. His suicidal ideation expressed this morning appears to have been expressed from fear of being outside overnight, and his violent ideations appear chronic in nature, though worsened by recent substance use. Given the patient's recent death threats and admission of access to a gun, he represents a risk of harm to others and requires inpatient admission for stabilization of his mood symptoms. Psychiatry recommends:    #Substance-induced mood disorder vs intellectual disability  - Admit under 9.39 legals  - Start Thorazine 50 mg PO bedtime     #agitation  For severe agitation not responding to behavioral intervention, may give thorazine 50 mg po q8h prn, ativan 2 mg po q6h prn, hydroxyzine 50 mg po q6h prn, with escalation to IM if patient refusing PO and remains an imminent danger to self or others. If IM antipsychotic is administered, please perform follow-up ECG for QTc monitoring. Mr. Gracia is a 26-year-old  male, originally from [place], [place], employed, living w/ uncle or friend vs roommate in [place], , no dependents, with reported psychiatric history of suspected autism spectrum disorder, PTSD, bipolar disorder and substance use disorder (alcohol, cocaine, cannabis, ecstasy), multiple prior inpatient psychiatry admissions, in the setting of substance intoxication and overdose, most recent hospitalization was 4/11/22-4/13/22 at [facility]; currently in outpt tx (reports next appointment in June), unclear past suicide attempts vs accidental overdose, presenting to the ED brought in by ambulance following making death threats to others. Psychiatry consulted for mental health evaluation.     Mr. Gracia's presentation appears precipitated by acute social stressors in the context of chronic predisposing factors including cognitive limitations, limited social engagement, and trauma, and perpetuating factors include substance use. His suicidal ideation expressed this morning appears to have been expressed from fear of being outside overnight, and his violent ideations appear chronic in nature, though worsened by recent substance use. Given the patient's recent death threats and admission of access to a gun, he represents a risk of harm to others and requires inpatient admission for stabilization of his mood symptoms. Psychiatry recommends:    #Substance-induced mood disorder vs intellectual disability  - Admit under 9.39 legals  -Discontinue seroquel.   - Start Thorazine 50 mg PO bedtime     #agitation  For severe agitation not responding to behavioral intervention, may give thorazine 50 mg po q8h prn, ativan 2 mg po q6h prn, hydroxyzine 50 mg po q6h prn, with escalation to IM if patient refusing PO and remains an imminent danger to self or others. If IM antipsychotic is administered, please perform follow-up ECG for QTc monitoring.
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
## 2022-04-29 NOTE — ED PROVIDER NOTE - PROGRESS NOTE DETAILS
Pt s/o to Dr. Chester to follow up psych consult, reassess and dispo. Authored by Dr. Chester:s/o to me by dr hernandez.  pending psych eval. on 1:1.  hx of psych d/o and substance abuse. calm. DC: psychiatry consult placed. Aware of pt and will evaluate for SI. Pt noted to be resting comfortably.

## 2022-04-29 NOTE — ED BEHAVIORAL HEALTH ASSESSMENT NOTE - ADDITIONAL DETAILS ALL
previously reported SA by polysubstance ingestion, now reporting desire to self-harm secondary to anger to himself
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
## 2022-04-29 NOTE — ED PROVIDER NOTE - OBJECTIVE STATEMENT
26 yold male to ED [facility] Schizophrenia, bipolar disorder, substance abuse(cocaine, alcohol, ectasy) last used yesterday; pt states he got evicted by his roommate and has no place to go; pt with suicidal ideation - want to overdose on drugs; pt states he overdosed on K2 ~1 yr ago; pt denies homocidal ideation; pt denies n/v/ fever, chills;

## 2022-04-29 NOTE — ED BEHAVIORAL HEALTH ASSESSMENT NOTE - REFERRAL / APPOINTMENT DETAILS
Dr. Wilkes at [facility], [place], [address] [place], [place] [address], [phone]

## 2022-04-29 NOTE — ED PROVIDER NOTE - PHYSICAL EXAMINATION
Constitutional: Well developed, well nourished. NAD  Head: Normocephalic, atraumatic.  Eyes: PERRL, EOMI.  ENT: No nasal discharge. Mucous membranes dry.  Neck: Supple. Painless ROM.  Cardiovascular:   Regular rate and rhythm.    Pulmonary:  Lungs clear to auscultation bilaterally.   Abdominal: Soft. Nondistended. No rebound, guarding, rigidity.  Extremities. Pelvis stable. No lower extremity edema, symmetric calves.  Skin: No rashes, cyanosis.  Neuro: AAOx3. No focal neurological deficits.  Psych: Normal mood. Normal affect.

## 2022-04-29 NOTE — ED BEHAVIORAL HEALTH ASSESSMENT NOTE - CASE SUMMARY
26-year-old male,  with reported psychiatric history of suspected autism spectrum disorder, PTSD, bipolar disorder and substance use disorder (alcohol, cocaine, cannabis, ecstasy),  who presents self to ED for suicidal ideation in the context of psychosocial stressors, seen by psychiatry service today and  substance use is one of the triggering factors playing a role in this patient's behavior along with his initial presentation to the ED, which is all resolved as patient is no longer intoxicated, urine toxicology is pending. Currently he is not exhibiting any suicidal ideation and  he is not psychotic. He  is now back to his baseline.  Until he acquires appropriate skills to address his poor coping skills and poor frustration tolerance, his risks of involvement in conflicts in others will remain high, and this is  intensified by underlying high suspicious of autism spectrum disorder/developmental delay. Currently he does no meet criteria for inpatient psychiatry admission. He is hopeful,  he is not suicidal, he is not homicidal, he is looking forward to go to work today and  he plans to continue follow up his psychiatrist, Dr. Wilkes at [facility].

## 2022-04-29 NOTE — ED PROVIDER NOTE - NSFOLLOWUPCLINICS_GEN_ALL_ED_FT
[facility]  OP Mental [facility]  [address] [place] [id number]  Phone: [phone]  Fax:
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
## 2022-04-29 NOTE — ED PROVIDER NOTE - PHYSICAL EXAMINATION
CONSTITUTIONAL: Well-appearing; well-nourished; in no apparent distress.   EYES: PERRL; EOM intact.   NECK: Supple; non-tender; no cervical lymphadenopathy.  CARDIOVASCULAR: Normal S1, S2; no murmurs, rubs, or gallops.   RESPIRATORY: Normal chest excursion with respiration; breath sounds clear and equal bilaterally; no wheezes, rhonchi, or rales.  GI/: non-distended; non-tender; no palpable organomegaly.   MS: No evidence of trauma or deformity. Non-tender to palpation. No scoliosis. No CVA tenderness. Normal ROM in all four extremities; non-tender to palpation; distal pulses are normal.   SKIN: Normal for age and race; warm; dry; good turgor; no apparent lesions or exudate.   NEURO/PSYCH: A & O x 4; Grooming and personal hygiene are appropriate. pt reports wanting to overdose because he cannot control his behavior and is getting into altercations

## 2022-04-29 NOTE — ED ADULT NURSE NOTE - NSIMPLEMENTINTERV_GEN_ALL_ED
Implemented All Fall with Harm Risk Interventions:  [place] to call system. Call bell, personal items and telephone within reach. Instruct patient to call for assistance. Room bathroom lighting operational. Non-slip footwear when patient is off stretcher. Physically safe environment: no spills, clutter or unnecessary equipment. Stretcher in lowest position, wheels locked, appropriate side rails in place. Provide visual cue, wrist band, yellow gown, etc. Monitor gait and stability. Monitor for mental status changes and reorient to person, place, and time. Review medications for side effects contributing to fall risk. Reinforce activity limits and safety measures with patient and family. Provide visual clues: red socks.

## 2022-04-29 NOTE — ED ADULT TRIAGE NOTE - CHIEF COMPLAINT QUOTE
pt states he has been having suicidal and homicidal ideations, sts "I have been going through a lot and was evicted from my home and I not right"

## 2022-04-29 NOTE — ED BEHAVIORAL HEALTH ASSESSMENT NOTE - PSYCHIATRIC ISSUES AND PLAN (INCLUDE STANDING AND PRN MEDICATION)
Thorazine 50 mg PO bedtime ,thorazine 50 mg po q8h prn, ativan 2 mg po q6h prn, hydroxyzine 50 mg po q6h prn

## 2022-04-29 NOTE — ED PROVIDER NOTE - PATIENT PORTAL LINK FT
You can access the FollowMyHealth Patient Portal offered by [facility] by registering at the following website: [email or web address]. By joining [employer]’s FollowMyHealth portal, you will also be able to view your health information using other applications (apps) compatible with our system.

## 2022-04-29 NOTE — ED PROVIDER NOTE - NSFOLLOWUPINSTRUCTIONS_ED_ALL_ED_FT
FOLLOW UP WITH:  Dr. Wilkes  [phone]  [facility] ([facility])  [phone]    [address] [place] [address]  [phone]    FOLLOW THE PLANS GIVEN TO YOU BY THE PSYCHIATRIST    Suicidal Feelings: How to Help Yourself  Suicide is when you end your own life. There are many things you can do to help yourself feel better when struggling with these feelings. Many services and people are available to support you and others who struggle with similar feelings.     If you ever feel like you may hurt yourself or others, or have thoughts about taking your own life, get help right away. To get help:   Call your local emergency services (941 in the [place]   Go to your nearest emergency department.   Call a suicide hotline to speak with a trained counselor. The following suicide hotlines are available in the [place]:  9-492-900-TALK (1-930.700.7532).  1-910-BTDYVJT (1-706.213.9621).  1-915.574.9718. This is a hotline for Yi speakers.  1-159.899.2472. This is a hotline for TTY users.  6-150-8-U-JARRETT (1-222.692.2221). This is a hotline for lesbian, burton, bisexual, transgender, or questioning youth.  For a list of hotlines in [place], visit www.suicide.org/hotlines/international/tvcerv-qzaokhn-xfrxdjwd.html  Contact a crisis center or a local suicide prevention center. To find a crisis center or suicide prevention center:  Call your local hospital, clinic, community service organization, mental health center, social service provider, or health department. Ask for help with connecting to a crisis center.  For a list of crisis centers in the [place], visit: suicidepreventionlifeline.org  For a list of crisis centers in [place], visit: suicideprevention.ca  How to help yourself feel better  Image   Promise yourself that you will not do anything extreme when you have suicidal feelings. Remember, there is hope. Many people have gotten through suicidal thoughts and feelings, and you can too. If you have had these feelings before, remind yourself that you can get through them again.  Let family, friends, teachers, or counselors know how you are feeling. Try not to separate yourself from those who care about you and want to help you. Talk with someone every day, even if you do not feel sociable. Face-to-face conversation is best to help them understand your feelings.  Contact a mental health care provider and work with this person regularly.  Make a safety plan that you can follow during a crisis. Include phone numbers of suicide prevention hotlines, mental health professionals, and trusted friends and family members you can call during an emergency. Save these numbers on your phone.  If you are thinking of taking a lot of medicine, give your medicine to someone who can give it to you as prescribed. If you are on antidepressants and are concerned you will overdose, tell your health care provider so that he or she can give you safer medicines.  Try to stick to your routines. Follow a schedule every day. Make self-care a priority.  Make a list of realistic goals, and cross them off when you achieve them. Accomplishments can give you a sense of worth.  Wait until you are feeling better before doing things that you find difficult or unpleasant.  Do things that you have always enjoyed to take your mind off your feelings. Try reading a book, or listening to or playing music. Spending time outside, in nature, may help you feel better.  Follow these instructions at home:  Image   Visit your primary health care provider every year for a checkup.  Work with a mental health care provider as needed.  Eat a well-balanced diet, and eat regular meals.  Get plenty of rest.  Exercise if you are able. Just 30 minutes of exercise each day can help you feel better.  Take over-the-counter and prescription medicines only as told by your health care provider. Ask your mental health care provider about the possible side effects of any medicines you are taking.  Do not use alcohol or drugs, and remove these substances from your home.  Remove weapons, poisons, knives, and other deadly items from your home.  General recommendations  Keep your living space well lit.  When you are feeling well, write yourself a letter with tips and support that you can read when you are not feeling well.  Remember that life's difficulties can be sorted out with help. Conditions can be treated, and you can learn behaviors and ways of thinking that will help you.  Where to find more information  National Suicide Prevention Lifeline: www.suicidepreventionlifeline.org  Hopeline: www.hopeline.com  American Foundation for Suicide Prevention: www.afsp.org  The Jarrett Project (for lesbian, burton, bisexual, transgender, or questioning youth): www.thetrevorproject.org  Contact a health care provider if:  You feel as though you are a burden to others.  You feel agitated, angry, vengeful, or have extreme mood swings.  You have withdrawn from family and friends.  Get help right away if:  You are talking about suicide or wishing to die.  You start making plans for how to commit suicide.  You feel that you have no reason to live.  You start making plans for putting your affairs in order, saying goodbye, or giving your possessions away.  You feel guilt, shame, or unbearable pain, and it seems like there is no way out.  You are frequently using drugs or alcohol.  You are engaging in risky behaviors that could lead to death.  If you have any of these symptoms, get help right away. Call emergency services, go to your nearest emergency department or crisis center, or call a suicide crisis helpline.     Summary  Suicide is when you take your own life.  Promise yourself that you will not do anything extreme when you have suicidal feelings.  Let family, friends, teachers, or counselors know how you are feeling.  Get help right away if you feel as though life is getting too tough to handle and you are thinking about suicide.  This information is not intended to replace advice given to you by your health care provider. Make sure you discuss any questions you have with your health care provider.    Document Released: 06/23/2004 Document Revised: 07/31/2018 Document Reviewed: 07/31/2018  [employer] Interactive Patient Education © 2019 [employer]

## 2022-04-29 NOTE — ED BEHAVIORAL HEALTH ASSESSMENT NOTE - HPI (INCLUDE ILLNESS QUALITY, SEVERITY, DURATION, TIMING, CONTEXT, MODIFYING FACTORS, ASSOCIATED SIGNS AND SYMPTOMS)
26-year-old male, originally from [place], [place], employed, living w/ uncle or friend vs roommate in [place], , no dependents, with reported psychiatric history of autism spectrum disorder, PTSD, bipolar disorder and substance use disorder (alcohol, cocaine, cannabis, ecstasy), multiple prior inpatient psychiatry admissions, in the setting of substance intoxication and overdose, most recent hospitalization was 4/11/22-4/13/22 at [facility]; currently in outpt tx (reports next , Mr. Gracia is a 26-year-old  male, originally from [place], [place], employed, living w/ uncle or friend vs roommate in [place], , no dependents, with reported psychiatric history of suspected autism spectrum disorder, PTSD, bipolar disorder and substance use disorder (alcohol, cocaine, cannabis, ecstasy), multiple prior inpatient psychiatry admissions, in the setting of substance intoxication and overdose, most recent hospitalization was 4/11/22-4/13/22 at [facility]; currently in outpt tx (reports next appointment in June), unclear past suicide attempts vs accidental overdose, presenting to the ED brought in by self for suicidal ideation in the context of psychosocial stressors. Psychiatry consulted for mental health evaluation.     On approach, Mr. Gracia was seen resting in a chair with 1:1 present. He was seen in a private space in the ED. He was alert and oriented x3, calm, cooperative, pleasant throughout encounter. He reports he came to the ED after being told by friends he could not stay at their apartment last night as they were having girls over, and he felt unsafe when he could not reach his uncle to stay there and did not want to sleep on the street. He reports this caused him to feel hopeless and "suicidal," clarifying he does not feel suicidal now and feels much better after sleeping. He denies past suicide attempts, though he reports accidental overdoses in the past that could have killed him.     With regard to his housing situation, he reports he moved all his belongings out of his previous domicile, and has been staying at his friend's or uncle's house while he saves for an apartment. He states his saving has been disrupted by intermittent drug use, which can cost up to 300 dollars per day. He last reports using ecstasy yesterday (1 pill) which he attributes his low mood to ("I always feel super depressed coming down"), as well as 3-4 joints of cannabis. He denies drinking in the past 4-6 weeks, and denies using cocaine in a few weeks. He denies any recent use of ketamine, and denies benzo or opioid use.     He reports his depressive symptoms are "all when I'm withdrawing," reporting feels of guilt, worthlessness, and sadness after using substances, particularly ecstasy and cocaine. Encouragement for sobriety provided. He reports difficulty sleeping, for which he says Seroquel helps him with . He denies rapid thoughts, seeing or hearing things others could not, or feeling singled out or as though others are after him. He reports violent ideations at times to his former roommate and friends, but denies intent, having access to a weapon, or a plan.     Called outpatient mental health provider, placed on hold.
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
## 2022-04-29 NOTE — ED BEHAVIORAL HEALTH ASSESSMENT NOTE - PRIVATE RESIDENCE
She doing great!  But needs to com ein to see you!!
intermittently domiciled in apartment with roommate or with friends

## 2022-04-29 NOTE — ED BEHAVIORAL HEALTH ASSESSMENT NOTE - SUMMARY
Mr. Gracia is a 26-year-old  male, originally from [place], [place], employed, living w/ uncle or friend vs roommate in [place], , no dependents, with reported psychiatric history of suspected autism spectrum disorder, PTSD, bipolar disorder and substance use disorder (alcohol, cocaine, cannabis, ecstasy), multiple prior inpatient psychiatry admissions, in the setting of substance intoxication and overdose, most recent hospitalization was 4/11/22-4/13/22 at [facility]; currently in outpt tx (reports next appointment in June), unclear past suicide attempts vs accidental overdose, presenting to the ED brought in by self for suicidal ideation in the context of psychosocial stressors. Psychiatry consulted for mental health evaluation.     Mr. Stinson's presentation appears precipitated by acute social stressors in the context of chronic predisposing factors including cognitive limitations, limited social engagement, and trauma, and perpetuating factors include substance use. His suicidal ideation expressed this morning appears to have been expressed from fear of being outside overnight, and his violent ideations appear chronic in nature, though worsened by recent substance use. Appears to be at baseline and not acutely psychotic or manic; his risk factors would not be mitigated by inpatient psychiatric admission. Psychiatry recommends:    #Substance-induced mood disorder vs intellectual disability  - Quetiapine 100 mg PO nightly  - Refer to case management for health home services   - Patient has an appointment with Dr. Wilkes at [facility], [address] [place] [id number], [phone] 6/2022

## 2022-04-29 NOTE — ED PROVIDER NOTE - ATTENDING APP SHARED VISIT CONTRIBUTION OF CARE
26-year-old male with PMH bipolar disorder, schizophrenia, Substance abuse presents for suicidal ideations.  Patient states He was kicked out by roommate and has nowhere to go and has been feeling increasing suicidal ideations.  Has thoughts of overdosing on drugs.  Denies any homicidal ideations.  No reported attempts to harm self. Denies any additional complaints or constitutional symptoms.    VITAL SIGNS: noted  CONSTITUTIONAL: Well-developed; well-nourished; in no acute distress  HEAD: Normocephalic; atraumatic  EYES: PERRL, EOM intact; conjunctiva and sclera clear  ENT: No nasal discharge; airway clear. MMM  NECK: Supple    CARD: S1, S2 normal; no murmurs, gallops, or rubs. Regular rate and rhythm  RESP: CTAB/L, no wheezes, rales or rhonchi  ABD: Normal bowel sounds; soft; non-distended; non-tender   EXT: Normal ROM. Distal pulses intact  NEURO: Alert, oriented. Grossly unremarkable. No focal deficits  PSYCH: calm and cooperative
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
## 2022-04-29 NOTE — ED BEHAVIORAL HEALTH ASSESSMENT NOTE - MEDICAL RECORD REVIEWED
NST sleeve cover sheet    Patient name: Donna Corbett  : 1993  MRN: [id number]    SHAILESH: Estimated Date of Delivery: 10/31/20    Obstetrician: ____SLOGA________    Reason(s) for testing:  ________IUGR____________      Testing frequency:    __x_ 2x/wk  ___ 1x/wk  ___ Dopplers  ___ BPP?       Last growth scan: __________________________________________
Precilla Shear  MRN: [id number] MRN: [id number] MRN: [id number]  : [birth year]  SHAILESH/GA: SHAILESH/GA: SHAILESH/GA:  Date:  Date:  Date:     Precilla Shear  MRN: [id number] MRN: [id number] MRN: [id number]  : [birth year]  SHAILESH/GA: SHAILESH/GA: SHAILESH/GA:  Date:  Date:  Date:     Precilla Shear  MRN: [id number] MRN: [id number] MRN: [id number]  : [birth year] : 1993  SHAILESH/GA: SHAILESH/GA: SHAILESH/GA:  Date:  Date:  Date:     Precilla Shear  MRN: [id number] MRN: [id number] MRN: [id number]  : [birth year]  SHAILESH/GA: SHAILESH/GA: SHAILESH/GA:  Date:  Date:  Date:     Precilla Shear  MRN: [id number] MRN: [id number] MRN: [id number]  : [birth year] : 1993  SHAILESH/GA: SHAILESH/GA: SHAILESH/GA:  Date:  Date:  Date:     Precilla Shear  MRN: [id number] MRN: [id number] MRN: [id number]  : [birth year] : 1993  SHAILESH/GA: SHAILESH/GA: SHAILESH/GA:  Date:  Date:  Date:     Precilla Shear  MRN: [id number] MRN: [id number] MRN: [id number]  : [birth year] : 1993  SHAILESH/GA: SHAILESH/GA: SHAILESH/GA:  Date:  Date:  Date:
Yes
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
## 2022-04-30 DIAGNOSIS — F31.9 BIPOLAR DISORDER, UNSPECIFIED: ICD-10-CM

## 2022-04-30 PROCEDURE — 99223 1ST HOSP IP/OBS HIGH 75: CPT

## 2022-04-30 RX ORDER — ACETAMINOPHEN 500 MG
650 TABLET ORAL EVERY 6 HOURS
Refills: 0 | Status: DISCONTINUED | OUTPATIENT
Start: 2022-04-30 | End: 2022-05-03

## 2022-04-30 RX ADMIN — Medication 2 MILLIGRAM(S): at 02:47

## 2022-04-30 RX ADMIN — Medication 50 MILLIGRAM(S): at 20:20
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
## 2022-04-30 NOTE — PATIENT PROFILE BEHAVIORAL HEALTH - FALL HARM RISK - UNIVERSAL INTERVENTIONS
Bed in lowest position, wheels locked, appropriate side rails in place/Call bell, personal items and telephone in reach/Instruct patient to call for assistance before getting out of bed or chair/Non-slip footwear when patient is out of bed/[place] to call system/Physically safe environment - no spills, clutter or unnecessary equipment/Purposeful Proactive Rounding/Room/bathroom lighting operational, light cord in reach

## 2022-04-30 NOTE — H&P ADULT - NSHPPHYSICALEXAM_GEN_ALL_CORE
Vital Signs Last 24 Hrs  T(C): 36.1 (30 Apr 2022 03:00), Max: 36.6 (29 Apr 2022 15:20)  T(F): 97 (30 Apr 2022 03:00), Max: 97.8 (29 Apr 2022 15:20)  HR: 60 (30 Apr 2022 03:00) (60 - 78)  BP: 109/61 (30 Apr 2022 03:00) (109/61 - 125/60)  BP(mean): --  RR: 16 (30 Apr 2022 03:00) (16 - 18)  SpO2: 100% (29 Apr 2022 15:20) (98% - 100%)

## 2022-04-30 NOTE — PROGRESS NOTE BEHAVIORAL HEALTH - NSBHFUPINTERVALHXFT_PSY_A_CORE
Chart reviewed ,  discussed with staff . patient seen  by his bed  side.  no acute event  overnight . Patient lying in bed, sleepy. minimally engaged. Mood " better". he denies  s/h ideations . he denies a/v hallucinations . He is poor historian and went pt went to sleep.

## 2022-04-30 NOTE — H&P ADULT - NSHPLABSRESULTS_GEN_ALL_CORE
14.2   8.34  )-----------( 312      ( 29 Apr 2022 04:20 )             43.8   04-29    132<L>  |  99  |  13  ----------------------------<  96  3.9   |  22  |  0.8 14.2   8.34  )-----------( 312      ( 29 Apr 2022 04:20 )             43.8   04-29    132<L>  |  99  |  13  ----------------------------<  96  3.9   |  22  |  0.8    COVID-19 PCR: NotDetec (29 Apr 2022 17:38)  COVID-19 PCR: NotDetec (11 Apr 2022 09:30)  COVID-19 PCR: NotDetec (08 Apr 2022 02:11)

## 2022-05-01 PROCEDURE — 99233 SBSQ HOSP IP/OBS HIGH 50: CPT

## 2022-05-01 RX ADMIN — Medication 50 MILLIGRAM(S): at 20:21

## 2022-05-01 NOTE — PROGRESS NOTE BEHAVIORAL HEALTH - NSBHFUPINTERVALHXFT_PSY_A_CORE
Chart reviewed, he got Ativan 2mg at 2:47pm yesterday, did not require prns overnight and has been in behavioral control.  Patient is employed at a local hotel. He experiences agitation with ecstasy which he takes four times weekly and psychosis with cannabis. He would like to stop using ecstasy. Presently he appears calm, cooperative, sleeping appropriately with improved mood, denies psychotic symptoms.  At this time, he denies thoughts of harm to self or others

## 2022-05-01 NOTE — PROGRESS NOTE BEHAVIORAL HEALTH - SUMMARY
Patient is a 27yo  male, employed, residing with uncle or friend, , no dependents, with reported psychiatric history of suspected autism spectrum disorder, PTSD, bipolar disorder, substance use disorder (alcohol, cocaine, cannabis, ecstasy), multiple prior inpatient psychiatry admissions, in the setting of substance intoxication and overdose, most recent hospitalization was 4/11/22-4/13/22 at [facility]; unclear past suicide attempts vs accidental overdose, admitted for harm to others and making death threats to others.     Mr. Gracia's presentation appears precipitated by acute social stressors in the context of chronic predisposing factors including cognitive limitations, limited social engagement, trauma, and perpetuating factors include substance use.     #Substance-induced mood disorder vs intellectual disability  Thorazine 50mg po bedtime

## 2022-05-02 PROCEDURE — 99232 SBSQ HOSP IP/OBS MODERATE 35: CPT

## 2022-05-02 RX ORDER — HYDROXYZINE HCL 10 MG
25 TABLET ORAL EVERY 6 HOURS
Refills: 0 | Status: DISCONTINUED | OUTPATIENT
Start: 2022-05-02 | End: 2022-05-03

## 2022-05-02 RX ORDER — QUETIAPINE FUMARATE 200 MG/1
50 TABLET, FILM COATED ORAL AT BEDTIME
Refills: 0 | Status: DISCONTINUED | OUTPATIENT
Start: 2022-05-02 | End: 2022-05-03

## 2022-05-02 RX ORDER — HALOPERIDOL DECANOATE 100 MG/ML
5 INJECTION INTRAMUSCULAR EVERY 6 HOURS
Refills: 0 | Status: DISCONTINUED | OUTPATIENT
Start: 2022-05-02 | End: 2022-05-03

## 2022-05-02 RX ADMIN — Medication 2 MILLIGRAM(S): at 07:59

## 2022-05-02 RX ADMIN — QUETIAPINE FUMARATE 50 MILLIGRAM(S): 200 TABLET, FILM COATED ORAL at 20:21

## 2022-05-02 RX ADMIN — Medication 2 MILLIGRAM(S): at 18:17

## 2022-05-02 NOTE — DISCHARGE NOTE BEHAVIORAL HEALTH - MEDICATION SUMMARY - MEDICATIONS TO CHANGE
I will SWITCH the dose or number of times a day I take the medications listed below when I get home from the hospital:    QUEtiapine 100 mg oral tablet  -- 1 tab(s) by mouth once a day (at bedtime) x 14 days or until provider tells you otherwise

## 2022-05-02 NOTE — DISCHARGE NOTE BEHAVIORAL HEALTH - NSBHDCSUICPROTECTFT_PSY_A_CORE
Patient denies suicidal ideations. Responsibility to family and others, Identifies reasons for living, Has future plans, Engaged in work

## 2022-05-02 NOTE — DISCHARGE NOTE BEHAVIORAL HEALTH - MEDICATION SUMMARY - MEDICATIONS TO STOP TAKING
I will STOP taking the medications listed below when I get home from the hospital:    nicotine 14 mg/24 hr transdermal film, extended release  -- 1 application by transdermal patch once a day x 14 days or until provider tells you otherwise
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
## 2022-05-02 NOTE — PROGRESS NOTE BEHAVIORAL HEALTH - NSBHADMITIPBHPROVFT_PSY_A_CORE
Left message for Dr. Charisma Wilkes at [facility] [phone]; spoke with  who confirmed patient made appointment on April 19th and has another appointment scheduled for June 8th. Left message for Dr. Charisma Wilkes at [facility] [phone]; spoke with  who confirmed patient made appointment on April 19th and has another appointment scheduled for June 8th.

## 2022-05-02 NOTE — BH SAFETY PLAN - THE ONE THING THAT IS MOST IMPORTANT TO ME AND WORTH LIVING FOR IS:
My life is important to me . I would like to find love and a new wife. Ever since my wife  I was never the same. I would also like to find a job, working is important to me.

## 2022-05-02 NOTE — DISCHARGE NOTE BEHAVIORAL HEALTH - NS MD DC FALL RISK RISK
For information on Fall & Injury Prevention, visit: [email or web address] OR  [email or web address] OR  https://www.cdc.gov/steadi/patient.html

## 2022-05-02 NOTE — DISCHARGE NOTE BEHAVIORAL HEALTH - MEDICATION SUMMARY - MEDICATIONS TO TAKE
I will START or STAY ON the medications listed below when I get home from the hospital:    QUEtiapine 50 mg oral tablet  -- 1 tab(s) by mouth once a day (at bedtime) x 30 days.  Continue until told otherwise by your provider.  -- Indication: For Insomnia

## 2022-05-02 NOTE — DISCHARGE NOTE BEHAVIORAL HEALTH - NSBHDCADMRISKMITFT_PSY_A_CORE
Has been medication compliant, not endorsing any side effects. Patient is future oriented and optimistic about starting Outpatient. Patient verbalizes reasons for living. Patient to use positive coping skills learned during the course of the inpatient hospitalization. Patient verbalized willingness to seek care in moment of crisis. Patient is agreeable that should he have any thoughts of hurting himself or others, he will call 911, return to the ED or call the National Suicide Prevention Lifeline, immediately.

## 2022-05-02 NOTE — BH SAFETY PLAN - ENVIRONMENT SAFETY 1:
I need help to find some housing and a place to live. I need to get away from the streets to be safer.

## 2022-05-02 NOTE — PROGRESS NOTE BEHAVIORAL HEALTH - SUMMARY
26-year-old  male, originally from [place], [place], employed, living w/ uncle or friend vs roommate in [place], , no dependents, with reported psychiatric history of suspected autism spectrum disorder, PTSD, bipolar disorder and substance use disorder (alcohol, cocaine, cannabis, ecstasy), multiple prior inpatient psychiatry admissions, in the setting of substance intoxication and overdose, most recent hospitalization was 4/11/22-4/13/22 at [facility]; currently in outpt tx (reports next appointment in June), unclear past suicide attempts vs accidental overdose, presenting to the ED brought in by ambulance following making death threats to others. Psychiatry consulted for mental health evaluation.     Mr. Gracia's presentation appears precipitated by acute social stressors in the context of chronic predisposing factors including cognitive limitations, limited social engagement, and trauma, and perpetuating factors include substance use. His suicidal ideation expressed this morning appears to have been expressed from fear of being outside overnight, and his violent ideations appear chronic in nature, though worsened by recent substance use. Given the patient's recent death threats and admission of access to a gun, he represents a risk of harm to others and requires inpatient admission for stabilization of his mood symptoms.    #Admit    #SIMDD  -Seroquel 50mg Bedtime for insomnia  -Haldol 5mg Q6 PRN for agitation  -lorazepam 2mg Q6 PRN for aggression  -Hydroxyzine 25mg Q6 PRN for anxiety/insomnia 26-year-old  male, originally from [place], [place], employed, living w/ uncle or friend vs roommate in [place], , no dependents, with reported psychiatric history of suspected autism spectrum disorder, PTSD, bipolar disorder and substance use disorder (alcohol, cocaine, cannabis, ecstasy), multiple prior inpatient psychiatry admissions, in the setting of substance intoxication and overdose, most recent hospitalization was 4/11/22-4/13/22 at [facility]; currently in outpt tx (reports next appointment in June), unclear past suicide attempts vs accidental overdose, presenting to the ED brought in by ambulance following making death threats to others. Psychiatry consulted for mental health evaluation.     Mr. Gracia's presentation appears precipitated by acute social stressors in the context of chronic predisposing factors including cognitive limitations, limited social engagement, and trauma, and perpetuating factors include substance use. His suicidal ideation expressed this morning appears to have been expressed from fear of being outside overnight, and his violent ideations appear chronic in nature, though worsened by recent substance use. Given the patient's recent death threats and admission of access to a gun, he represents a risk of harm to others and requires inpatient admission for stabilization of his mood symptoms.    #Admit    #SIMDD  -Seroquel 50mg Bedtime for insomnia  -Haldol 5mg Q6 PRN for agitation  -lorazepam 2mg Q6 PRN for aggression  -Hydroxyzine 25mg Q6 PRN for anxiety/insomnia    -Tylenol PRN for pain
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
## 2022-05-02 NOTE — DISCHARGE NOTE BEHAVIORAL HEALTH - NSBHDCRESPONSEFT_PSY_A_CORE
Patient denied any suicidal or homicidal ideations. Patient denied any auditory or visual hallucinations. Patient is future oriented, optimistic and motivated to participate in Outpatient treatment.  Patient understands and verbalized agreement with treatment plan and following up with outpatient. Psychoeducation provided regarding diagnosis, medications, treatment and follow up. Risks, benefits, alternatives discussed, all questions and concerns addressed and answered.
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
## 2022-05-02 NOTE — PROGRESS NOTE BEHAVIORAL HEALTH - NSBHFUPINTERVALHXFT_PSY_A_CORE
Patient seen and evaluated on IPP. As per nursing report no acute events. On approach patient calm and cooperative. No agitation or aggression noted. Patient in good behavioral control. Patient states he came into the ED the first time because the guys he was staying with had girls over. He was told that when the girls are over he cant stay there. Was under the influence of ecstasy at the time and this upset him. Went to the ED for a place to stay. Then stated he felt fine and was discharged. Stated he later got into an argument with a girl at the hair salon and threatened to "shoot" her. States he did not have a gun and does not own a gun. States he know he shouldn't have said that but he was angry. States he knows he needs to stop using drugs. Patient Denies homicidal ideations. Denies suicidal ideations. States he needs to stay off the drugs and go to work. States he is fine when not using. States he kept his follow up appointment with Dr. Wilkes psychiatrist on April 19th. States he did not  meds right away when discharge last admission, only on Seroquel. States he had trouble sleeping a few days ago and the Seroquel helps him sleep. States he picked up the meds a few days ago but only took the Seroquel one day. Request to be started back on Seroquel. Denies A/V hallucinations, but stated sometimes when he is under the influence he sees spirits or demons. States he uses Ecstasy, marijuana and Cocaine. Denies any ETOH use and gave up cigarette's when last admitted. Denies any s/s of shawanda. Request discharge by Thursday so he can go to work.     Left message for patients uncle Luis Angel Babcock [phone].    Confirmed with [facility] [phone] patient picked up Seroquel 100mg on 4/28. Patient seen and evaluated on IPP. As per nursing report no acute events. On approach patient calm and cooperative. No agitation or aggression noted. Patient in good behavioral control. Patient states he came into the ED the first time because the guys he was staying with had girls over. He was told that when the girls are over he cant stay there. Was under the influence of ecstasy at the time and this upset him. Went to the ED for a place to stay. Then stated he felt fine and was discharged. Stated he later got into an argument with a girl at the hair salon and threatened to "shoot" her. States he did not have a gun and does not own a gun. States he knows he shouldn't have said that but he was angry. States he knows he needs to stop using drugs. Patient Denies homicidal ideations. Denies suicidal ideations. States he needs to stay off the drugs and go to work. States he is fine when not using. States he kept his follow up appointment with Dr. Wilkes psychiatrist on April 19th. States he did not  meds right away when discharged last admission, only on Seroquel. States he had trouble sleeping a few days ago and the Seroquel helps him sleep. States he picked up the meds a few days ago but only took the Seroquel one day. Requested to be started back on Seroquel. Denies A/V hallucinations, but stated sometimes when he is under the influence he sees spirits or demons. States he uses Ecstasy, marijuana and Cocaine. Denies any ETOH use and gave up cigarette's when last admitted. Denies any s/s of shawanda. Request discharge by Thursday so he can go to work.     Left message for patients uncle Luis Angel Babcock [phone].    Confirmed with [facility] [phone] patient picked up Seroquel 100mg on 4/28.    Left message for Dr. Charisma Wilkes at [facility] [phone]; spoke with  who confirmed patient made appointment on April 19th and has another appointment scheduled for June 8th.

## 2022-05-02 NOTE — PROGRESS NOTE BEHAVIORAL HEALTH - NSBHFUPADDHPIFT_PSY_A_CORE
As per chart review, HPI obtained in the emergency room.      26-year-old  male, originally from [place], [place], employed, living w/ uncle or friend vs roommate in [place], , no dependents, with reported psychiatric history of suspected autism spectrum disorder, PTSD, bipolar disorder and substance use disorder (alcohol, cocaine, cannabis, ecstasy), multiple prior inpatient psychiatry admissions, in the setting of substance intoxication and overdose, most recent hospitalization was 4/11/22-4/13/22 at [facility]; currently in outpt tx (reports next appointment in June), unclear past suicide attempts vs accidental overdose, presenting to the ED brought in by self for suicidal ideation in the context of psychosocial stressors. Psychiatry consulted for mental health evaluation. On approach, Mr. Gracia was seen resting in a chair with 1:1 present. He was seen in a private space in the ED. He was alert and oriented x3, calm, cooperative, pleasant throughout encounter. He reports he came to the ED after being told by friends he could not stay at their apartment last night as they were having girls over, and he felt unsafe when he could not reach his uncle to stay there and did not want to sleep on the street. He reports this caused him to feel hopeless and "suicidal," clarifying he does not feel suicidal now and feels much better after sleeping. He denies past suicide attempts, though he reports accidental overdoses in the past that could have killed him.     With regard to his housing situation, he reports he moved all his belongings out of his previous domicile, and has been staying at his friend's or uncle's house while he saves for an apartment. He states his saving has been disrupted by intermittent drug use, which can cost up to 300 dollars per day. He last reports using ecstasy yesterday (1 pill) which he attributes his low mood to ("I always feel super depressed coming down"), as well as 3-4 joints of cannabis. He denies drinking in the past 4-6 weeks, and denies using cocaine in a few weeks. He denies any recent use of ketamine, and denies benzo or opioid use. He reports his depressive symptoms are "all when I'm withdrawing," reporting feels of guilt, worthlessness, and sadness after using substances, particularly ecstasy and cocaine. Encouragement for sobriety provided. He reports difficulty sleeping, for which he says Seroquel helps him with . He denies rapid thoughts, seeing or hearing things others could not, or feeling singled out or as though others are after him. He reports violent ideations at times to his former roommate and friends, but denies intent, having access to a weapon, or a plan. Following the above, the patient returned to the ED brought in by ambulance following the patient threatening a woman with a gun. The patient was reevaluated in the ED. He reports after leaving the ED previously, he got into an argument with his girlfriend and told her he would shoot her. He reports "I cannot control my emotions after I take these pills," and that "I just want to punish myself." He endorses suicidality in this context, and that he would harm himself with "pills or bullets." He admits he has access to a gun through an associate in [place], contradicting earlier statements, though he denies easy direct access. He further reports he would likely be unable to regulate his emotions if he were allowed to leave.

## 2022-05-03 VITALS
HEART RATE: 72 BPM | DIASTOLIC BLOOD PRESSURE: 55 MMHG | SYSTOLIC BLOOD PRESSURE: 106 MMHG | RESPIRATION RATE: 16 BRPM | TEMPERATURE: 96 F

## 2022-05-03 LAB
A1C WITH ESTIMATED AVERAGE GLUCOSE RESULT: 5.1 % — SIGNIFICANT CHANGE UP (ref 4–5.6)
ALBUMIN SERPL ELPH-MCNC: 4.4 G/DL — SIGNIFICANT CHANGE UP (ref 3.5–5.2)
ALP SERPL-CCNC: 66 U/L — SIGNIFICANT CHANGE UP (ref 30–115)
ALT FLD-CCNC: 34 U/L — SIGNIFICANT CHANGE UP (ref 0–41)
ANION GAP SERPL CALC-SCNC: 10 MMOL/L — SIGNIFICANT CHANGE UP (ref 7–14)
AST SERPL-CCNC: 31 U/L — SIGNIFICANT CHANGE UP (ref 0–41)
BILIRUB SERPL-MCNC: <0.2 MG/DL — SIGNIFICANT CHANGE UP (ref 0.2–1.2)
BUN SERPL-MCNC: 12 MG/DL — SIGNIFICANT CHANGE UP (ref 10–20)
CALCIUM SERPL-MCNC: 9.6 MG/DL — SIGNIFICANT CHANGE UP (ref 8.5–10.1)
CHLORIDE SERPL-SCNC: 106 MMOL/L — SIGNIFICANT CHANGE UP (ref 98–110)
CHOLEST SERPL-MCNC: 184 MG/DL — SIGNIFICANT CHANGE UP
CO2 SERPL-SCNC: 26 MMOL/L — SIGNIFICANT CHANGE UP (ref 17–32)
CREAT SERPL-MCNC: 0.7 MG/DL — SIGNIFICANT CHANGE UP (ref 0.7–1.5)
EGFR: 130 ML/MIN/1.73M2 — SIGNIFICANT CHANGE UP
ESTIMATED AVERAGE GLUCOSE: 100 MG/DL — SIGNIFICANT CHANGE UP (ref 68–114)
GLUCOSE SERPL-MCNC: 93 MG/DL — SIGNIFICANT CHANGE UP (ref 70–99)
HDLC SERPL-MCNC: 44 MG/DL — SIGNIFICANT CHANGE UP
LIPID PNL WITH DIRECT LDL SERPL: 115 MG/DL — HIGH
NON HDL CHOLESTEROL: 140 MG/DL — HIGH
POTASSIUM SERPL-MCNC: 4.5 MMOL/L — SIGNIFICANT CHANGE UP (ref 3.5–5)
POTASSIUM SERPL-SCNC: 4.5 MMOL/L — SIGNIFICANT CHANGE UP (ref 3.5–5)
PROT SERPL-MCNC: 6.7 G/DL — SIGNIFICANT CHANGE UP (ref 6–8)
SODIUM SERPL-SCNC: 142 MMOL/L — SIGNIFICANT CHANGE UP (ref 135–146)
TRIGL SERPL-MCNC: 126 MG/DL — SIGNIFICANT CHANGE UP
TSH SERPL-MCNC: 1.87 UIU/ML — SIGNIFICANT CHANGE UP (ref 0.27–4.2)

## 2022-05-03 PROCEDURE — 99238 HOSP IP/OBS DSCHRG MGMT 30/<: CPT

## 2022-05-03 RX ORDER — QUETIAPINE FUMARATE 200 MG/1
1 TABLET, FILM COATED ORAL
Qty: 30 | Refills: 0
Start: 2022-05-03 | End: 2022-06-01

## 2022-05-03 NOTE — PROGRESS NOTE BEHAVIORAL HEALTH - NSBHCHARTREVIEWVS_PSY_A_CORE FT
Vital Signs Last 24 Hrs  T(C): 37.1 (02 May 2022 09:18), Max: 37.1 (02 May 2022 09:18)  T(F): 98.7 (02 May 2022 09:18), Max: 98.7 (02 May 2022 09:18)  HR: 102 (02 May 2022 09:18) (62 - 102)  BP: 110/66 (02 May 2022 09:18) (96/51 - 114/55)  BP(mean): --  RR: 18 (02 May 2022 09:18) (18 - 18)  SpO2: --
Vital Signs Last 24 Hrs  T(C): 36.1 (30 Apr 2022 08:34), Max: 36.1 (30 Apr 2022 03:00)  T(F): 96.9 (30 Apr 2022 08:34), Max: 97 (30 Apr 2022 03:00)  HR: 110 (30 Apr 2022 08:34) (60 - 110)  BP: 120/74 (30 Apr 2022 08:34) (109/61 - 120/74)  BP(mean): --  RR: 18 (30 Apr 2022 08:34) (16 - 18)  SpO2: --
ICU Vital Signs Last 24 Hrs  T(C): 36 (01 May 2022 08:08), Max: 36 (01 May 2022 08:08)  T(F): 96.8 (01 May 2022 08:08), Max: 96.8 (01 May 2022 08:08)  HR: 68 (01 May 2022 08:08) (55 - 68)  BP: 124/80 (01 May 2022 08:08) (108/55 - 124/80)  BP(mean): --  ABP: --  ABP(mean): --  RR: 18 (01 May 2022 08:08) (16 - 18)  SpO2: --
Vital Signs Last 24 Hrs  T(C): 35.6 (03 May 2022 08:33), Max: 35.6 (03 May 2022 08:21)  T(F): 96 (03 May 2022 08:33), Max: 96 (03 May 2022 08:21)  HR: 72 (03 May 2022 08:33) (72 - 72)  BP: 106/55 (03 May 2022 08:33) (106/55 - 106/55)  BP(mean): --  RR: 16 (03 May 2022 08:33) (16 - 16)  SpO2: --

## 2022-05-03 NOTE — PROGRESS NOTE BEHAVIORAL HEALTH - NSBHCHARTREVIEWINVESTIGATE_PSY_A_CORE FT
< from: 12 Lead ECG (04.29.22 @ 10:23) >      Ventricular Rate 56 BPM    Atrial Rate 56 BPM    P-R Interval 140 ms    QRS Duration 78 ms    Q-T Interval 376 ms    QTC Calculation(Bazett) 362 ms    P Axis 49 degrees    R Axis 61 degrees    T Axis 59 degrees    Diagnosis Line Sinus bradycardia  Otherwise normal ECG    < end of copied text >

## 2022-05-03 NOTE — PROGRESS NOTE BEHAVIORAL HEALTH - RISK ASSESSMENT
Risk factors:  Agitation, continued illicit drug and alcohol use   Protective factors: Appears amenable for treatment, willingness to ask for assistance.
patient's risk factors of substance use and previous violence history are largely mitigated by his future orientation, his insight and honesty in reporting his symptoms, his strong support system, his commitment to improve and his established outpatient care.
patient's risk factors of substance use and previous violence history are largely mitigated by his future orientation, his insight and honesty in reporting his symptoms, his strong support system, his commitment to improve and his established outpatient care.

## 2022-05-03 NOTE — PROGRESS NOTE BEHAVIORAL HEALTH - NSBHFUPINTERVALHXFT_PSY_A_CORE
Patient seen and evaluated. On approach patient calm and cooperative. No agitation or aggression noted. In good behavioral control. Patient denies suicidal/homicidal ideations. Again confirms does not have access to a weapon. Denies A/V hallucinations. Denies any s/s of shawanda. Requests discharge today. States he is not suicidal. States he will continue to follow up with his psychiatrist. States he needs to go back to work. States he is fine when not using ecstasy. Hospitalization appears to have been precipitated by acute social stressors in the context of chronic predisposing factors including cognitive limitations, limited social engagement, and trauma, and perpetuating factors include substance use. Patient compliant with medication. Future oriented. Does not warrant continued Inpatient hospitalization. Patient does not present an imminent risk to self or others at this time. Patient seen and evaluated. On approach patient calm and cooperative. No agitation or aggression noted. In good behavioral control. Patient denies suicidal/homicidal ideations. Again confirms does not have access to a weapon. Denies A/V hallucinations. Denies any s/s of shawanda. Requests discharge today. States he is not suicidal. States he will continue to follow up with his psychiatrist. States he needs to go back to work. States he is fine when not using ecstasy. Hospitalization appears to have been precipitated by acute social stressors in the context of chronic predisposing factors including cognitive limitations, limited social engagement, and trauma, and perpetuating factors include substance use. Patient compliant with medication. Future oriented. Does not warrant continued Inpatient hospitalization. Patient does not present an imminent risk to self or others at this time.    Left message for patients  Luis Angel Babcock [phone]. Made him aware patient to be discharged today. Left call back number. Per patient he already made uncle aware. Patient seen and evaluated. On approach patient calm and cooperative. No agitation or aggression noted. In good behavioral control. Patient denies suicidal/homicidal ideations. Again confirms does not have access to a weapon. Denies A/V hallucinations. Denies any s/s of shawanda. Requests discharge today. States he is not suicidal. States he will continue to follow up with his psychiatrist. States he needs to go back to work. States he is fine when not using ecstasy. Hospitalization appears to have been precipitated by acute social stressors in the context of chronic predisposing factors including cognitive limitations, limited social engagement, and trauma, and perpetuating factors include substance use. Patient compliant with medication. Future oriented. Does not warrant continued Inpatient hospitalization. Patient does not present an imminent risk to self or others at this time.    Left message for patients uncle Luis Angel Babcock [phone]. Made him aware patient to be discharged today. Left call back number. Per patient he already made uncle aware.    Spoke to patients grandmother akash [phone]. Confirms patient does not have access to a weapon. States patient acts different went using substances. States patient will be staying with his uncle, her son. States he started some training yesterday and that's why he is unable to answer the phone. States she spoke to patient this morning. Has no concerns with discharge.

## 2022-05-03 NOTE — PROGRESS NOTE BEHAVIORAL HEALTH - NSBHCHARTREVIEWLAB_PSY_A_CORE FT
Complete Blood Count + Automated Diff (04.29.22 @ 04:20)   WBC Count: 8.34 K/uL   RBC Count: 5.22 M/uL   Hemoglobin: 14.2 g/dL   Hematocrit: 43.8    Mean Cell Volume: 83.9 fL   Mean Cell Hemoglobin: 27.2 pg   Mean Cell Hemoglobin Conc: 32.4 g/dL   Red Cell Distrib Width: 12.9   Platelet Count - Automated: 312 K/uL   Auto Neutrophil #: 5.64 K/uL   Auto Lymphocyte #: 2.01 K/uL   Auto Monocyte #: 0.48 K/uL   Auto Eosinophil #: 0.15 K/uL   Auto Basophil #: 0.04 K/uL   Auto Neutrophil %: 67.6:   Auto Lymphocyte %: 24.1    Auto Monocyte %: 5.8    Auto Eosinophil %: 1.8    Auto Basophil %: 0.5    Auto Immature Granulocyte %: 0.2: (Includes meta, myelo and promyelocytes)    Nucleated RBC: 0 /100 WBCs
Complete Blood Count + Automated Diff (04.29.22 @ 04:20)   WBC Count: 8.34 K/uL   RBC Count: 5.22 M/uL   Hemoglobin: 14.2 g/dL   Hematocrit: 43.8    Mean Cell Volume: 83.9 fL   Mean Cell Hemoglobin: 27.2 pg   Mean Cell Hemoglobin Conc: 32.4 g/dL   Red Cell Distrib Width: 12.9   Platelet Count - Automated: 312 K/uL   Auto Neutrophil #: 5.64 K/uL   Auto Lymphocyte #: 2.01 K/uL   Auto Monocyte #: 0.48 K/uL   Auto Eosinophil #: 0.15 K/uL   Auto Basophil #: 0.04 K/uL   Auto Neutrophil %: 67.6:   Auto Lymphocyte %: 24.1    Auto Monocyte %: 5.8    Auto Eosinophil %: 1.8    Auto Basophil %: 0.5    Auto Immature Granulocyte %: 0.2: (Includes meta, myelo and promyelocytes)    Nucleated RBC: 0 /100 WBCs

## 2022-05-03 NOTE — PROGRESS NOTE BEHAVIORAL HEALTH - SUMMARY
26-year-old  male, originally from [place], [place], employed, living w/ uncle or friend vs roommate in [place], , no dependents, with reported psychiatric history of suspected autism spectrum disorder, PTSD, bipolar disorder and substance use disorder (alcohol, cocaine, cannabis, ecstasy), multiple prior inpatient psychiatry admissions, in the setting of substance intoxication and overdose, most recent hospitalization was 4/11/22-4/13/22 at [facility]; currently in outpt tx (reports next appointment in June), unclear past suicide attempts vs accidental overdose, presenting to the ED brought in by ambulance following making death threats to others. Psychiatry consulted for mental health evaluation.     Patient seen and evaluated. On approach patient calm and cooperative. No agitation or aggression noted. In good behavioral control. Patient denies suicidal/homicidal ideations. Again confirms does not have access to a weapon. Denies A/V hallucinations. Denies any s/s of shawanda. Requests discharge today. States he is not suicidal. States he will continue to follow up with his psychiatrist. States he needs to go back to work. States he is fine when not using ecstasy. Hospitalization appears to have been precipitated by acute social stressors in the context of chronic predisposing factors including cognitive limitations, limited social engagement, and trauma, and perpetuating factors include substance use. Patient compliant with medication. Future oriented. Does not warrant continued Inpatient hospitalization. Patient does not present an imminent risk to self or others at this time.    #Admit    #SIMDD  -Seroquel 50mg Bedtime for insomnia  -Haldol 5mg Q6 PRN for agitation  -lorazepam 2mg Q6 PRN for aggression  -Hydroxyzine 25mg Q6 PRN for anxiety/insomnia    -Tylenol PRN for pain

## 2022-05-03 NOTE — CHART NOTE - NSCHARTNOTEFT_GEN_A_CORE
D/C today. [facility] cards provided. Meds sent to [facility] as requested by patient. Patient denies suicidal/ homicidal ideations. Hospitalization appears to have been precipitated by acute social stressors in the context of chronic predisposing factors including cognitive limitations, limited social engagement, and trauma, and perpetuating factors include substance use. Compliant with medication. Does not warrant continued Inpatient hospitalization. Writer reviewed D/C papers with patient. Patient verbalized understanding. Patient does not appear to be of imminent danger to self or others at this time.    Aftercare Appointments:  · Agency Name	[facility]  · Appointment Date/Time	04-May-2022 09:00  · Address	[address] [place] [address]  · Phone #	[phone]  · Purpose	Intake Appointment **IN PERSON**    · Agency Name	[facility]  · Appointment Date/Time	09-Jun-2022 14:00  · Address	[address] [place] [id number]  · Phone #	[phone]  · Purpose	Psychiatry Appointment with Dr. Wilkes **IN PERSON**
D/c plan reviewed with nurse practitioner.
Social Work Admit Note:    Patient is a 26 years of age male who was admitted for evaluation of suicidal ideation.  At the time of assessment in the emergency department, patient informed that he came to the hospital because his friends stated he could not stay at their apartment last night and he did not want to sleep on the street. He informed that this caused him to feel hopeless and suicidal.  Patient endorsed recent substance use and informed that he often feels depressed when he’s withdrawing.  Patient was cleared by psych and discharged but shortly after he returned to the ED per [facility] request after allegedly threatening a woman with a gun.  Patient informed that he lost control of his emotions and wants to punish himself.  He endorsed suicidal ideation with plan to harm himself with "pills or bullets." He was admitted to [facility] for safety and stabilization.     In the community, patient sometimes stays with his uncle and sometimes stays with friends/roommates.  He is single marital status and has no known dependents.  Patient is employed.  He has a past psychiatric history of autism spectrum disorder, PTSD, bipolar disorder, and substance use disorder (alcohol, cocaine, cannabis, ecstasy).  He has multiple prior inpatient psychiatry admissions, most recently at [facility] from 4/11/22-4/13/22.  Patient is engaged in outpatient treatment at [facility].      Sexual History – Patient identifies as heterosexual     Family relationships and history – Patient identifies his uncle as his primary social support    Leisure Activity Assessment – Unable to assess    Community Supports –  None known    Employment – Patient is employed    Substance Use Assessment – Patient endorses use of cocaine and ecstasy    	  History of suicidality or self- injurious behaviors – None known     Significant Loses – None identified     Life Goals – Patient verbalized goal of improved mental health.       will continue to meet with patient 1:1 and with treatment team daily.  Discharge plan is for continued mental health treatment in an outpatient setting.  Referral to address substance use will also be explored.
chart reviewed and treatment plan discussed with nurse practitioner
Social Work Discharge Note:    Patient is for discharge today.  He is alert and oriented x3.  Mood has improved.  Anxiety has decreased.  Insight and judgment have improved.  Suicidal/homicidal ideation denied.    Patient will be discharged to his home on [address].  He has been referred to the [facility] for outpatient substance use treatment.   Patient will also resume treatment with his psychiatrist, Dr. Wilkes at [facility].      Patient reports his cell phone # is [phone]    Patient is aware and agreeable to discharge today

## 2022-05-03 NOTE — PROGRESS NOTE BEHAVIORAL HEALTH - NSBHADMITIPBHPROVFT_PSY_A_CORE
Left message for Dr. Charisma Wilkes at [facility] [phone]; spoke with  who confirmed patient made appointment on April 19th and has another appointment scheduled for June 8th.

## 2022-05-03 NOTE — PROGRESS NOTE BEHAVIORAL HEALTH - NSBHADMITIPREASON_PSY_A_CORE
Danger to others; mental illness expected to respond to inpatient care

## 2022-05-05 LAB — DRUG SCREEN, SERUM: SIGNIFICANT CHANGE UP

## 2022-05-07 ENCOUNTER — EMERGENCY (EMERGENCY)
Facility: HOSPITAL | Age: 27
LOS: 0 days | Discharge: HOME | End: 2022-05-07
Attending: EMERGENCY MEDICINE | Admitting: EMERGENCY MEDICINE
Payer: MEDICAID

## 2022-05-07 VITALS
SYSTOLIC BLOOD PRESSURE: 112 MMHG | RESPIRATION RATE: 19 BRPM | OXYGEN SATURATION: 99 % | HEIGHT: 64 IN | WEIGHT: 149.91 LBS | HEART RATE: 79 BPM | TEMPERATURE: 98 F | DIASTOLIC BLOOD PRESSURE: 93 MMHG

## 2022-05-07 VITALS
OXYGEN SATURATION: 100 % | RESPIRATION RATE: 18 BRPM | TEMPERATURE: 97 F | SYSTOLIC BLOOD PRESSURE: 108 MMHG | HEART RATE: 68 BPM | DIASTOLIC BLOOD PRESSURE: 75 MMHG

## 2022-05-07 DIAGNOSIS — F84.0 AUTISTIC DISORDER: ICD-10-CM

## 2022-05-07 DIAGNOSIS — Z88.1 ALLERGY STATUS TO OTHER ANTIBIOTIC AGENTS STATUS: ICD-10-CM

## 2022-05-07 DIAGNOSIS — R45.850 HOMICIDAL IDEATIONS: ICD-10-CM

## 2022-05-07 DIAGNOSIS — F31.9 BIPOLAR DISORDER, UNSPECIFIED: ICD-10-CM

## 2022-05-07 DIAGNOSIS — Z20.822 CONTACT WITH AND (SUSPECTED) EXPOSURE TO COVID-19: ICD-10-CM

## 2022-05-07 DIAGNOSIS — F19.90 OTHER PSYCHOACTIVE SUBSTANCE USE, UNSPECIFIED, UNCOMPLICATED: ICD-10-CM

## 2022-05-07 DIAGNOSIS — F43.10 POST-TRAUMATIC STRESS DISORDER, UNSPECIFIED: ICD-10-CM

## 2022-05-07 DIAGNOSIS — F19.10 OTHER PSYCHOACTIVE SUBSTANCE ABUSE, UNCOMPLICATED: ICD-10-CM

## 2022-05-07 DIAGNOSIS — Z87.891 PERSONAL HISTORY OF NICOTINE DEPENDENCE: ICD-10-CM

## 2022-05-07 LAB
ANION GAP SERPL CALC-SCNC: 15 MMOL/L — HIGH (ref 7–14)
APAP SERPL-MCNC: <5 UG/ML — LOW (ref 10–30)
BASOPHILS # BLD AUTO: 0.04 K/UL — SIGNIFICANT CHANGE UP (ref 0–0.2)
BASOPHILS NFR BLD AUTO: 0.5 % — SIGNIFICANT CHANGE UP (ref 0–1)
BUN SERPL-MCNC: 23 MG/DL — HIGH (ref 10–20)
CALCIUM SERPL-MCNC: 9.7 MG/DL — SIGNIFICANT CHANGE UP (ref 8.5–10.1)
CHLORIDE SERPL-SCNC: 105 MMOL/L — SIGNIFICANT CHANGE UP (ref 98–110)
CO2 SERPL-SCNC: 20 MMOL/L — SIGNIFICANT CHANGE UP (ref 17–32)
CREAT SERPL-MCNC: 0.8 MG/DL — SIGNIFICANT CHANGE UP (ref 0.7–1.5)
EGFR: 125 ML/MIN/1.73M2 — SIGNIFICANT CHANGE UP
EOSINOPHIL # BLD AUTO: 0.26 K/UL — SIGNIFICANT CHANGE UP (ref 0–0.7)
EOSINOPHIL NFR BLD AUTO: 3.2 % — SIGNIFICANT CHANGE UP (ref 0–8)
ETHANOL SERPL-MCNC: <10 MG/DL — SIGNIFICANT CHANGE UP
GLUCOSE SERPL-MCNC: 88 MG/DL — SIGNIFICANT CHANGE UP (ref 70–99)
HCT VFR BLD CALC: 42.3 % — SIGNIFICANT CHANGE UP (ref 42–52)
HGB BLD-MCNC: 14.2 G/DL — SIGNIFICANT CHANGE UP (ref 14–18)
IMM GRANULOCYTES NFR BLD AUTO: 0.4 % — HIGH (ref 0.1–0.3)
LYMPHOCYTES # BLD AUTO: 1.98 K/UL — SIGNIFICANT CHANGE UP (ref 1.2–3.4)
LYMPHOCYTES # BLD AUTO: 24 % — SIGNIFICANT CHANGE UP (ref 20.5–51.1)
MCHC RBC-ENTMCNC: 27.8 PG — SIGNIFICANT CHANGE UP (ref 27–31)
MCHC RBC-ENTMCNC: 33.6 G/DL — SIGNIFICANT CHANGE UP (ref 32–37)
MCV RBC AUTO: 82.9 FL — SIGNIFICANT CHANGE UP (ref 80–94)
MONOCYTES # BLD AUTO: 0.49 K/UL — SIGNIFICANT CHANGE UP (ref 0.1–0.6)
MONOCYTES NFR BLD AUTO: 5.9 % — SIGNIFICANT CHANGE UP (ref 1.7–9.3)
NEUTROPHILS # BLD AUTO: 5.45 K/UL — SIGNIFICANT CHANGE UP (ref 1.4–6.5)
NEUTROPHILS NFR BLD AUTO: 66 % — SIGNIFICANT CHANGE UP (ref 42.2–75.2)
NRBC # BLD: 0 /100 WBCS — SIGNIFICANT CHANGE UP (ref 0–0)
PLATELET # BLD AUTO: 278 K/UL — SIGNIFICANT CHANGE UP (ref 130–400)
POTASSIUM SERPL-MCNC: 4.2 MMOL/L — SIGNIFICANT CHANGE UP (ref 3.5–5)
POTASSIUM SERPL-SCNC: 4.2 MMOL/L — SIGNIFICANT CHANGE UP (ref 3.5–5)
RBC # BLD: 5.1 M/UL — SIGNIFICANT CHANGE UP (ref 4.7–6.1)
RBC # FLD: 13.3 % — SIGNIFICANT CHANGE UP (ref 11.5–14.5)
SALICYLATES SERPL-MCNC: <0.3 MG/DL — LOW (ref 4–30)
SARS-COV-2 RNA SPEC QL NAA+PROBE: SIGNIFICANT CHANGE UP
SODIUM SERPL-SCNC: 140 MMOL/L — SIGNIFICANT CHANGE UP (ref 135–146)
WBC # BLD: 8.25 K/UL — SIGNIFICANT CHANGE UP (ref 4.8–10.8)
WBC # FLD AUTO: 8.25 K/UL — SIGNIFICANT CHANGE UP (ref 4.8–10.8)

## 2022-05-07 PROCEDURE — 99285 EMERGENCY DEPT VISIT HI MDM: CPT

## 2022-05-07 PROCEDURE — 90792 PSYCH DIAG EVAL W/MED SRVCS: CPT | Mod: GC

## 2022-05-07 PROCEDURE — 93010 ELECTROCARDIOGRAM REPORT: CPT

## 2022-05-07 NOTE — ED PROVIDER NOTE - NSFOLLOWUPINSTRUCTIONS_ED_ALL_ED_FT
Safety Plan:  Step 1: Identify warning signs (thoughts, images, mood, situation, behavior) that a crisis may be developing	.  Warning Sign 1:	When I notice people staring at me for a long time  Warning Sign 2:	When I notice people talking under their breath, which may be about me  Step 2: Identify internal coping strategies - Things I can do to take my mind off my problems without contacting another person (relaxation technique, physical activity)	.  Internal Coping Strategy 1:	Listening to music and keeping the headphones in  Internal Coping Strategy 2:	Playing basketball  Internal Coping Strategy 3:	Ignoring people if I think they may be talking about me or staring  Step 3: People and social settings that provide distraction	.  Name:	Noris Abad (Grandmother)  Phone:	[phone]  Name:	Luis Angel Babcock (Uncle)  Phone:	[phone]  Place:	[facility]; playing basketball  Step 4: People whom I can ask for help	.  Name:	Noris Abad (Grandmother)  Phone:	[phone]  Name:	Luis Angel Babcock (Uncle)  Phone:	[phone]  Step 5: Professionals or agencies I can contact during a crisis	.  Suicide Prevention Lifeline Phone:	3-252-949-PHWA (0342)  Environment Safety 1:	Having my phone to listen to music  The one thing that is most important to me and worth living for is:	My relationship with grandmother and uncle. My future plans for work.

## 2022-05-07 NOTE — ED PROVIDER NOTE - CARE PROVIDER_API CALL
Please make sure to follow up with your primary care docotor in 3 days,   Phone: (   )    -  Fax: (   )    -  Follow Up Time:

## 2022-05-07 NOTE — ED BEHAVIORAL HEALTH ASSESSMENT NOTE - DESCRIPTION
None known See HPI. Unemployed. Undomiciled currently but was last staying with cousin in the [place]. Official consult placed for day team at 8:11am. Pt evaluated. Collateral obtained.

## 2022-05-07 NOTE — ED PROVIDER NOTE - CLINICAL SUMMARY MEDICAL DECISION MAKING FREE TEXT BOX
Patient with bizarre behavior, question of wanting to hurt other people.  Seen by psychiatry and cleared.  No suicidal homicidal ideation on my evaluation.  Calm.  Psychiatry does not feel he is acute threat to himself or others at this time. will discharge.

## 2022-05-07 NOTE — ED PROVIDER NOTE - PROVIDER TOKENS
FREE:[LAST:[Please make sure to follow up with your primary care docotor in 3 days],PHONE:[(   )    -],FAX:[(   )    -]]

## 2022-05-07 NOTE — ED PROVIDER NOTE - PHYSICAL EXAMINATION
CONSTITUTIONAL: in no acute distress, restless, talking to himself  SKIN: warm, dry  HEAD: Normocephalic; atraumatic.  EYES: no conjunctival injection. PERRL.   ENT: No nasal discharge; airway clear.  NECK: Supple; non tender.  CARD: S1, S2 normal; no murmurs, gallops, or rubs. Regular rate and rhythm.   RESP: No wheezes, rales or rhonchi.  ABD: soft ntnd  EXT: Normal ROM.  No clubbing, cyanosis or edema.   LYMPH: No acute cervical adenopathy.  NEURO: Alert, oriented, grossly unremarkable  PSYCH: Restless, talking to himself CONSTITUTIONAL: agitated, restless, talking to himself  SKIN: warm, dry  HEAD: Normocephalic; atraumatic.  EYES: no conjunctival injection. PERRL.   ENT: No nasal discharge; airway clear.  NECK: Supple; non tender.  CARD: S1, S2 normal; no murmurs, gallops, or rubs. Regular rate and rhythm.   RESP: No wheezes, rales or rhonchi.  ABD: soft ntnd  EXT: Normal ROM.  No clubbing, cyanosis or edema.   LYMPH: No acute cervical adenopathy.  NEURO: Alert, oriented, grossly unremarkable  PSYCH: Restless, talking to himself
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
## 2022-05-07 NOTE — ED PROVIDER NOTE - ATTENDING APP SHARED VISIT CONTRIBUTION OF CARE
26-year-old male with PMH bipolar, autism, substance abuse disorder,  presents complaining of feeling homicidal.  Patient stated he had access to gun and felt like "shooting people".  Also reports seeing some "demons" Patient recently discharged from inpatient psychiatry.  No suicidal complaints. Denies any headache, chest pain, shortness of breath, fevers or cough.    VITAL SIGNS: noted  CONSTITUTIONAL: Well-developed; well-nourished; in no acute distress  HEAD: Normocephalic; atraumatic  EYES: PERRL, EOM intact; conjunctiva and sclera clear  ENT: No nasal discharge; airway clear. MMM  NECK: Supple; non tender.    CARD: S1, S2 normal; no murmurs, gallops, or rubs. Regular rate and rhythm  RESP: CTAB/L, no wheezes, rales or rhonchi  ABD: Normal bowel sounds; soft; non-distended; non-tender   EXT: Normal ROM. No calf tenderness or edema. Distal pulses intact  NEURO: Alert, oriented. Grossly unremarkable. No focal deficits  SKIN: Skin exam is warm and dry  PSYCH: calm and cooperative, restless at times 26-year-old male with PMH bipolar, autism, substance abuse disorder,  presents complaining of feeling homicidal.  Patient stated he had access to gun and felt like "shooting people".  Also reports seeing some "demons" Patient recently discharged from inpatient psychiatry.  No suicidal complaints. Denies any headache, chest pain, shortness of breath, fevers or cough. Patient comes in on his own requesting psychiatric evaluation.    VITAL SIGNS: noted  CONSTITUTIONAL: Well-developed; well-nourished; in no acute distress  HEAD: Normocephalic; atraumatic  EYES: PERRL, EOM intact; conjunctiva and sclera clear  ENT: No nasal discharge; airway clear. MMM  NECK: Supple; non tender.    CARD: S1, S2 normal; no murmurs, gallops, or rubs. Regular rate and rhythm  RESP: CTAB/L, no wheezes, rales or rhonchi  ABD: Normal bowel sounds; soft; non-distended; non-tender   EXT: Normal ROM. No calf tenderness or edema. Distal pulses intact  NEURO: Alert, oriented. Grossly unremarkable. No focal deficits  SKIN: Skin exam is warm and dry  PSYCH: calm and cooperative, restless at times

## 2022-05-07 NOTE — ED BEHAVIORAL HEALTH ASSESSMENT NOTE - OTHER
Noris Abad (Grandmother) - [phone] Undomiciled Unclear of marital status, per previous notes  Not formally assessed Patient was informed to return to the ED or call 911 if his symptoms worsen

## 2022-05-07 NOTE — ED BEHAVIORAL HEALTH ASSESSMENT NOTE - DIFFERENTIAL
Differential for this case includes autism spectrum disorder, PTSD, bipolar disorder and substance use disorder (alcohol, cocaine, cannabis, ecstasy).

## 2022-05-07 NOTE — ED BEHAVIORAL HEALTH ASSESSMENT NOTE - REFERRAL / APPOINTMENT DETAILS
On discharge, can refer patient to [facility], located at [address] [place] [address], phone number - [phone] (patient can be given [phone] to make appointment themselves). Patient was given a list of Outpatient and Inpatient Substance use Rehabs which patient happily accepted . he also reports that he will be going to his cousin in [place] after leaving the ED and will call [facility] himself.
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
## 2022-05-07 NOTE — ED BEHAVIORAL HEALTH ASSESSMENT NOTE - MEDICATIONS (PRESCRIPTIONS, DIRECTIONS)
No new prescriptions. Encouraged pt to  Seroquel prescription. No new prescriptions. Encouraged pt to  Seroquel prescription sent from  [facility] on his discharge on 5/3/22. patient was agreeable .

## 2022-05-07 NOTE — ED PROVIDER NOTE - NS ED ATTENDING STATEMENT MOD
This was a shared visit with the ORLY. I reviewed and verified the documentation and independently performed the documented: Attending with
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
## 2022-05-07 NOTE — ED BEHAVIORAL HEALTH ASSESSMENT NOTE - OTHER PAST PSYCHIATRIC HISTORY (INCLUDE DETAILS REGARDING ONSET, COURSE OF ILLNESS, INPATIENT/OUTPATIENT TREATMENT)
Per chart - reported history of autism spectrum, schizophrenia, bipolar disorder, and related multiple prior inpatient psychiatry admissions, in the setting of substance use.
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
## 2022-05-07 NOTE — ED PROVIDER NOTE - OBJECTIVE STATEMENT
Patient is a 27 yo male with PMHx of autism, PTSD, bipolar d/o, substance use d/o c/o homicidal ideations. Patient says that he wants to "shoot some people out", but could not specify who. Came to the ED to get help. Denies SI, but noticed seeing some "demons". Denies chest pain, SOB, abdominal pain, n/v/d.

## 2022-05-07 NOTE — ED BEHAVIORAL HEALTH ASSESSMENT NOTE - ADDITIONAL DETAILS ALL
See HPI. Pt denies specifically working out details as to how he would kill himself. He only affirms thinking about jumping from a building, but no details such as what specific building.

## 2022-05-07 NOTE — ED BEHAVIORAL HEALTH ASSESSMENT NOTE - DETAILS
Chart reviewed See HPI Performed See HPI. Pt denies specifically working out details as to how he would kill himself. He only affirms thinking about jumping from a building, but no details such as what specific building. See  Safety Plan note. Substance use Per chart review, abuse during incarceration. Patient came on his own

## 2022-05-07 NOTE — ED PROVIDER NOTE - ATTENDING CONTRIBUTION TO CARE
26-year-old male with PMH bipolar, autism, substance abuse disorder,  presents complaining of feeling homicidal.  Patient stated he had access to gun and felt like "shooting people".  Also reports seeing some "demons" Patient recently discharged from inpatient psychiatry.  No suicidal complaints. Denies any headache, chest pain, shortness of breath, fevers or cough. Patient comes in on his own requesting psychiatric evaluation.    VITAL SIGNS: noted  CONSTITUTIONAL: Well-developed; well-nourished; in no acute distress  HEAD: Normocephalic; atraumatic  EYES: PERRL, EOM intact; conjunctiva and sclera clear  ENT: No nasal discharge; airway clear. MMM  NECK: Supple; non tender.    CARD: S1, S2 normal; no murmurs, gallops, or rubs. Regular rate and rhythm  RESP: CTAB/L, no wheezes, rales or rhonchi  ABD: Normal bowel sounds; soft; non-distended; non-tender   EXT: Normal ROM. No calf tenderness or edema. Distal pulses intact  NEURO: Alert, oriented. Grossly unremarkable. No focal deficits  SKIN: Skin exam is warm and dry  PSYCH: calm and cooperative, restless at times

## 2022-05-07 NOTE — ED PROVIDER NOTE - PATIENT PORTAL LINK FT
You can access the FollowMyHealth Patient Portal offered by [facility] by registering at the following website: [email or web address]. By joining [facility]’s FollowMyHealth portal, you will also be able to view your health information using other applications (apps) compatible with our system.

## 2022-05-07 NOTE — ED BEHAVIORAL HEALTH ASSESSMENT NOTE - CASE SUMMARY
Mr Gracia is a 26-year-old man, with a history of  Autism spectrum disorder, PTSD, bipolar disorder and substance use disorder (alcohol, cocaine, cannabis, ecstasy), who came to the ED complaining oif having suicidal thoughts.  Psychiatry consult for mental health evaluation.  Patient denies having current suicidal ideations , intent or plan. It appears that  patient is having difficult coping with his current social stressors the most important being his recent job and housing loss.  His difficult adjusting to his current stressor s seems to be very frustrating and distressing to him causing significant dysphoria. Patient also seems to have significant illicit drug use which seems to have contributed to the loss of his job and also seems to be putting him in potentially compromising situations . Patient however appears to be able to problems solve to an extent and seems to have the understanding of the need to attain and maintain sobriety.   For now, patient does not appear to have acute symptoms of psychosis, shawanda or depression. He denies having current suicidal ideations, intent or plan.  At this time, patient is not considered an imminent danger to himself or others and does not need inpatient psychiatric hospitalization. Patient will benefit from substance use disorder rehab   because of his continued illicit Substance use and also case management services because of the difficulty he has addressing  with his life's stressors. he will benefit from continuation of his Seroquel 50mg p.o bedtime to target his dalia and sleep. Patient was given a list of substance use rehabs and he  reports that he would go to [place] to stay his cousin after which he will contact [facility], to inquire about substance use disorder services. Patient's grandmother  and uncle do not seems to have any acute concern for suicide in this patient.

## 2022-05-07 NOTE — ED BEHAVIORAL HEALTH ASSESSMENT NOTE - RISK ASSESSMENT
Risk factors - medication noncompliance, homelessness, substance use, not working    Protective factors - no prior suicide attempts, willingness to come to ED Low Acute Suicide Risk

## 2022-05-07 NOTE — ED PROVIDER NOTE - PROGRESS NOTE DETAILS
MQ: Will obtain ecg, labs, and will consult telepsych MQ: Talked to telepsych Dr. Ocampo, will evaluate patient TD: Patient reassessed, resting comfortably in bed NAD with 1:1 at bedside, no agitation/outbursts for ~2h now. Pending psychiatry evaluation, reassessment, and dispo. AX: signout received from DR. Sexton, patient currently sleeping calmly, pending telepsychiatry evaluation for disposition. Pt s/o to Dr. Barker to follow up EKG, psych consult, reassess and dispo. YC: Spoke with psych and reports given. Pt will be added to their list and will come down see the patient. susyk: signed out to Dr. Almaraz pending psych eval and recs. pt in NAD, Appears slightly disorganized, but calm. Spoke with psych and pt has been cleared by psych. Pt will be discharged. Pt has been encouraged to  his medicine. Safety instruction created by psych has been printed out and handed to patient. Pt is stable for discharge. susyk: signed out to Dr. Almaraz pending psych eval and recs. pt in NAD, Appears slightly disorganized, but calm. denies SI/HI.

## 2022-05-09 DIAGNOSIS — R01.1 CARDIAC MURMUR, UNSPECIFIED: ICD-10-CM

## 2022-05-09 DIAGNOSIS — Z88.1 ALLERGY STATUS TO OTHER ANTIBIOTIC AGENTS STATUS: ICD-10-CM

## 2022-05-09 DIAGNOSIS — F19.14 OTHER PSYCHOACTIVE SUBSTANCE ABUSE WITH PSYCHOACTIVE SUBSTANCE-INDUCED MOOD DISORDER: ICD-10-CM

## 2022-05-09 DIAGNOSIS — F43.10 POST-TRAUMATIC STRESS DISORDER, UNSPECIFIED: ICD-10-CM

## 2022-05-09 DIAGNOSIS — F84.0 AUTISTIC DISORDER: ICD-10-CM

## 2022-05-09 DIAGNOSIS — F99 MENTAL DISORDER, NOT OTHERWISE SPECIFIED: ICD-10-CM

## 2022-05-09 DIAGNOSIS — Z91.14 PATIENT'S OTHER NONCOMPLIANCE WITH MEDICATION REGIMEN: ICD-10-CM

## 2022-05-09 DIAGNOSIS — F31.9 BIPOLAR DISORDER, UNSPECIFIED: ICD-10-CM

## 2022-05-10 NOTE — ED ADULT TRIAGE NOTE - NS ED NURSE BANDS TYPE
OFFICE VISIT      Patient: Selena Benavidez Date of Service: 5/10/2022   : 1972 MRN: [id number]     [employer].       Children:   Britany at [employer] in [place]   Miguel Angel, tragically   in train-pedestrian accident while riding bike    [place]   Carlos Alberto  2013 [place]     [place]/ igor Hooper   Retinal/ Julianna Geovani: 2002   • Death of child 12/13/2020    Occurred 9/17/Chioma Otoole MD     Name band;

## 2022-06-02 ENCOUNTER — INPATIENT (INPATIENT)
Facility: HOSPITAL | Age: 27
LOS: 5 days | Discharge: HOME | End: 2022-06-08
Attending: PSYCHIATRY & NEUROLOGY | Admitting: PSYCHIATRY & NEUROLOGY
Payer: MEDICAID

## 2022-06-02 VITALS
HEIGHT: 64 IN | TEMPERATURE: 97 F | DIASTOLIC BLOOD PRESSURE: 64 MMHG | OXYGEN SATURATION: 99 % | SYSTOLIC BLOOD PRESSURE: 131 MMHG | RESPIRATION RATE: 18 BRPM | HEART RATE: 75 BPM | WEIGHT: 164.91 LBS

## 2022-06-02 DIAGNOSIS — F31.9 BIPOLAR DISORDER, UNSPECIFIED: ICD-10-CM

## 2022-06-02 DIAGNOSIS — F84.0 AUTISTIC DISORDER: ICD-10-CM

## 2022-06-02 DIAGNOSIS — F19.10 OTHER PSYCHOACTIVE SUBSTANCE ABUSE, UNCOMPLICATED: ICD-10-CM

## 2022-06-02 DIAGNOSIS — F43.10 POST-TRAUMATIC STRESS DISORDER, UNSPECIFIED: ICD-10-CM

## 2022-06-02 DIAGNOSIS — F63.81 INTERMITTENT EXPLOSIVE DISORDER: ICD-10-CM

## 2022-06-02 LAB
ALBUMIN SERPL ELPH-MCNC: 4.7 G/DL — SIGNIFICANT CHANGE UP (ref 3.5–5.2)
ALP SERPL-CCNC: 72 U/L — SIGNIFICANT CHANGE UP (ref 30–115)
ALT FLD-CCNC: 17 U/L — SIGNIFICANT CHANGE UP (ref 0–41)
ANION GAP SERPL CALC-SCNC: 18 MMOL/L — HIGH (ref 7–14)
APAP SERPL-MCNC: <5 UG/ML — LOW (ref 10–30)
AST SERPL-CCNC: 23 U/L — SIGNIFICANT CHANGE UP (ref 0–41)
BASOPHILS # BLD AUTO: 0.04 K/UL — SIGNIFICANT CHANGE UP (ref 0–0.2)
BASOPHILS NFR BLD AUTO: 0.7 % — SIGNIFICANT CHANGE UP (ref 0–1)
BILIRUB SERPL-MCNC: <0.2 MG/DL — SIGNIFICANT CHANGE UP (ref 0.2–1.2)
BUN SERPL-MCNC: 20 MG/DL — SIGNIFICANT CHANGE UP (ref 10–20)
CALCIUM SERPL-MCNC: 9.5 MG/DL — SIGNIFICANT CHANGE UP (ref 8.5–10.1)
CHLORIDE SERPL-SCNC: 107 MMOL/L — SIGNIFICANT CHANGE UP (ref 98–110)
CO2 SERPL-SCNC: 19 MMOL/L — SIGNIFICANT CHANGE UP (ref 17–32)
CREAT SERPL-MCNC: 0.9 MG/DL — SIGNIFICANT CHANGE UP (ref 0.7–1.5)
EGFR: 121 ML/MIN/1.73M2 — SIGNIFICANT CHANGE UP
EOSINOPHIL # BLD AUTO: 0.29 K/UL — SIGNIFICANT CHANGE UP (ref 0–0.7)
EOSINOPHIL NFR BLD AUTO: 5 % — SIGNIFICANT CHANGE UP (ref 0–8)
ETHANOL SERPL-MCNC: <10 MG/DL — SIGNIFICANT CHANGE UP
GLUCOSE SERPL-MCNC: 135 MG/DL — HIGH (ref 70–99)
HCT VFR BLD CALC: 45.2 % — SIGNIFICANT CHANGE UP (ref 42–52)
HGB BLD-MCNC: 14.7 G/DL — SIGNIFICANT CHANGE UP (ref 14–18)
IMM GRANULOCYTES NFR BLD AUTO: 0.3 % — SIGNIFICANT CHANGE UP (ref 0.1–0.3)
LYMPHOCYTES # BLD AUTO: 1.83 K/UL — SIGNIFICANT CHANGE UP (ref 1.2–3.4)
LYMPHOCYTES # BLD AUTO: 31.8 % — SIGNIFICANT CHANGE UP (ref 20.5–51.1)
MCHC RBC-ENTMCNC: 27.7 PG — SIGNIFICANT CHANGE UP (ref 27–31)
MCHC RBC-ENTMCNC: 32.5 G/DL — SIGNIFICANT CHANGE UP (ref 32–37)
MCV RBC AUTO: 85.1 FL — SIGNIFICANT CHANGE UP (ref 80–94)
MONOCYTES # BLD AUTO: 0.47 K/UL — SIGNIFICANT CHANGE UP (ref 0.1–0.6)
MONOCYTES NFR BLD AUTO: 8.2 % — SIGNIFICANT CHANGE UP (ref 1.7–9.3)
NEUTROPHILS # BLD AUTO: 3.1 K/UL — SIGNIFICANT CHANGE UP (ref 1.4–6.5)
NEUTROPHILS NFR BLD AUTO: 54 % — SIGNIFICANT CHANGE UP (ref 42.2–75.2)
NRBC # BLD: 0 /100 WBCS — SIGNIFICANT CHANGE UP (ref 0–0)
PLATELET # BLD AUTO: 284 K/UL — SIGNIFICANT CHANGE UP (ref 130–400)
POTASSIUM SERPL-MCNC: 4.3 MMOL/L — SIGNIFICANT CHANGE UP (ref 3.5–5)
POTASSIUM SERPL-SCNC: 4.3 MMOL/L — SIGNIFICANT CHANGE UP (ref 3.5–5)
PROT SERPL-MCNC: 6.9 G/DL — SIGNIFICANT CHANGE UP (ref 6–8)
RBC # BLD: 5.31 M/UL — SIGNIFICANT CHANGE UP (ref 4.7–6.1)
RBC # FLD: 13.2 % — SIGNIFICANT CHANGE UP (ref 11.5–14.5)
SALICYLATES SERPL-MCNC: <0.3 MG/DL — LOW (ref 4–30)
SARS-COV-2 RNA SPEC QL NAA+PROBE: SIGNIFICANT CHANGE UP
SODIUM SERPL-SCNC: 144 MMOL/L — SIGNIFICANT CHANGE UP (ref 135–146)
WBC # BLD: 5.75 K/UL — SIGNIFICANT CHANGE UP (ref 4.8–10.8)
WBC # FLD AUTO: 5.75 K/UL — SIGNIFICANT CHANGE UP (ref 4.8–10.8)

## 2022-06-02 PROCEDURE — 93010 ELECTROCARDIOGRAM REPORT: CPT

## 2022-06-02 PROCEDURE — 90792 PSYCH DIAG EVAL W/MED SRVCS: CPT | Mod: NC,95

## 2022-06-02 PROCEDURE — 99285 EMERGENCY DEPT VISIT HI MDM: CPT

## 2022-06-02 PROCEDURE — 90792 PSYCH DIAG EVAL W/MED SRVCS: CPT | Mod: GC

## 2022-06-02 RX ORDER — DIVALPROEX SODIUM 500 MG/1
500 TABLET, DELAYED RELEASE ORAL
Refills: 0 | Status: DISCONTINUED | OUTPATIENT
Start: 2022-06-02 | End: 2022-06-08

## 2022-06-02 RX ORDER — HALOPERIDOL DECANOATE 100 MG/ML
5 INJECTION INTRAMUSCULAR EVERY 6 HOURS
Refills: 0 | Status: DISCONTINUED | OUTPATIENT
Start: 2022-06-02 | End: 2022-06-02

## 2022-06-02 RX ORDER — CHLORPROMAZINE HCL 10 MG
50 TABLET ORAL EVERY 6 HOURS
Refills: 0 | Status: DISCONTINUED | OUTPATIENT
Start: 2022-06-02 | End: 2022-06-04

## 2022-06-02 RX ADMIN — HALOPERIDOL DECANOATE 5 MILLIGRAM(S): 100 INJECTION INTRAMUSCULAR at 12:12

## 2022-06-02 RX ADMIN — DIVALPROEX SODIUM 500 MILLIGRAM(S): 500 TABLET, DELAYED RELEASE ORAL at 20:29

## 2022-06-02 NOTE — ED PROVIDER NOTE - CLINICAL SUMMARY MEDICAL DECISION MAKING FREE TEXT BOX
Case signed out to me by Dr. Acosta -- patient with extensive hx as noted presenting with homicidal ideation, specific plans for harm with a firearm.     Was medically cleared and seen by psych, will need IPP placement. Will hold in ED pending a bed as there are no [facility] beds available.
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
## 2022-06-02 NOTE — ED BEHAVIORAL HEALTH ASSESSMENT NOTE - NS ED BHA HOMICIDALITY PRESENT CURRENT PLAN
Other (Free Text): Discussed patient using Imiquimod cream or having a PDT light treatment. Patient states he will wait until after the holidays to decide which treatment.
Detail Level: Detailed
Render Risk Assessment In Note?: no
Note Text (......Xxx Chief Complaint.): This diagnosis correlates with the
None known

## 2022-06-02 NOTE — ED PROVIDER NOTE - NS ED ROS FT
CONSTITUTIONAL - No acute distress , No weight change  SKIN - No rash  HEMATOLOGIC - No abnormal bleeding or bruising  EYES - , No conjunctival injection, No drainage   ENT -, No sore throat, No neck pain, No rhinorrhea, No ear pain  RESPIRATORY - No shortness of breath, No cough  CARDIAC -No chest pain, No palpitations  GI - No abdominal pain, No nausea, No vomiting, No diarrhea, No constipation, No bright red blood per rectum or melena. No flank pain  - No dysuria, frequency, hematuria  MUSCULOSKELETAL - No joint paint, No swelling, No back pain  NEUROLOGIC - No numbness, No focal weakness, No headache, No dizziness  ALLERGIC- no pruritis

## 2022-06-02 NOTE — ED BEHAVIORAL HEALTH ASSESSMENT NOTE - PSYCHIATRIC ISSUES AND PLAN (INCLUDE STANDING AND PRN MEDICATION)
Thorazine 50 mg PO bedtime ,thorazine 50 mg po q8h prn, ativan 2 mg po q6h prn, hydroxyzine 50 mg po q6h prn seroquel 50mg po x 1 now; may use haldol 5/ativan2 po or IM q6hrs prn agitaiton

## 2022-06-02 NOTE — ED PROVIDER NOTE - PHYSICAL EXAMINATION
CONSTITUTIONAL: Well-developed; well-nourished; NAD  SKIN: warm, dry, w/o rash  HEAD: NCAT  EYES: PERRLA, EOMI, no conjunctival injection  ENT: No nasal discharge; nl OP without erythema or exudates  NECK: Supple, non-tender  CARD: nl S1, S2; RRR, no MRG, no JVD  RESP: CTAB, normal respiratory effort  ABD: BS+, soft, NTND, no HSM  EXT: Normal ROM.  No clubbing, cyanosis or edema  NEURO: Alert, oriented, grossly unremarkable  PSYCH: Cooperative, appropriate; +HI, +SI

## 2022-06-02 NOTE — ED BEHAVIORAL HEALTH ASSESSMENT NOTE - SUMMARY
Mr. Gracia is a 26-year-old  male, originally from [place], [place], employed, living w/ uncle or friend vs roommate in [place], , no dependents, with reported psychiatric history of suspected autism spectrum disorder, PTSD, bipolar disorder and substance use disorder (alcohol, cocaine, cannabis, ecstasy), multiple prior inpatient psychiatry admissions, in the setting of substance intoxication and overdose, most recent hospitalization was 4/11/22-4/13/22 at [facility]; currently in outpt tx (reports next appointment in June), unclear past suicide attempts vs accidental overdose, presenting to the ED brought in by ambulance following making death threats to others. Psychiatry consulted for mental health evaluation.     Mr. Gracia's presentation appears precipitated by acute social stressors in the context of chronic predisposing factors including cognitive limitations, limited social engagement, and trauma, and perpetuating factors include substance use. His suicidal ideation expressed this morning appears to have been expressed from fear of being outside overnight, and his violent ideations appear chronic in nature, though worsened by recent substance use. Given the patient's recent death threats and admission of access to a gun, he represents a risk of harm to others and requires inpatient admission for stabilization of his mood symptoms. Psychiatry recommends:    #Substance-induced mood disorder vs intellectual disability  - Admit under 9.39 legals  -Discontinue seroquel.   - Start Thorazine 50 mg PO bedtime     #agitation  For severe agitation not responding to behavioral intervention, may give thorazine 50 mg po q8h prn, ativan 2 mg po q6h prn, hydroxyzine 50 mg po q6h prn, with escalation to IM if patient refusing PO and remains an imminent danger to self or others. If IM antipsychotic is administered, please perform follow-up ECG for QTc monitoring. Mr. Gracia is a 26-year-old  male, originally from [place], [place], employed, living w/ uncle or friend vs roommate in [place], , no dependents, with reported psychiatric history of suspected autism spectrum disorder, PTSD, bipolar disorder and substance use disorder (alcohol, cocaine, cannabis, ecstasy), multiple prior inpatient psychiatry admissions, in the setting of substance intoxication and overdose, most recent hospitalization was 4/11/22-4/13/22 at [facility]; currently in outpt tx (reports next appointment in June), unclear past suicide attempts vs accidental overdose, presenting to the ED brought in by self for suicidal ideation and homicidal ideations.     Patient no longer reports homicidal ideations intent plan but endorses suicidal ideations with plan to jump in front of a train. Mr. Gracia's presentation appears precipitated by acute social stressors in the context of chronic predisposing factors including cognitive limitations, limited social engagement, and trauma, and perpetuating factors include substance use.  he is noncompliant with treatment and lacks social supports. he is an elevated risk of harm to self and others and would benefit from inpatient psychiatric hospitalization for safety and stabilization.

## 2022-06-02 NOTE — BH CONSULTATION LIAISON PROGRESS NOTE - NSICDXBHSECONDARYDX_PSY_ALL_CORE
Bipolar 1 disorder, depressed   F31.9  Substance abuse   F19.10  Autism disorder   F84.0  Post traumatic stress disorder (PTSD)   F43.10   Autism disorder   F84.0  Intermittent explosive disorder   F63.81

## 2022-06-02 NOTE — ED ADULT NURSE NOTE - OBJECTIVE STATEMENT
pt presents to the ed with suicidal ideations  pt is axox4  follows commands  reports being off his meds   pt reports violent tendencies towards others prior to coming to the ed  1:1 initiated

## 2022-06-02 NOTE — ED PROVIDER NOTE - ATTENDING CONTRIBUTION TO CARE
26-year-old male with PMH autism, bipolar, history of substance abuse presents reporting homicidal ideations.  Patient stated he wanted to "erase her face".  Also thought about obtaining a firearm and hurting someone.  Patient also reports he wants to die.  Patient has been seen for similar complaints in the past. Denies any additional complaints.    VITAL SIGNS: noted  CONSTITUTIONAL: Well-developed; well-nourished; in no acute distress  HEAD: Normocephalic; atraumatic  EYES: PERRL, EOM intact; conjunctiva and sclera clear  ENT: No nasal discharge; airway clear. MMM  NECK: Supple; non tender.    CARD: S1, S2 normal; no murmurs, gallops, or rubs. Regular rate and rhythm  RESP: CTAB/L, no wheezes, rales or rhonchi  ABD: Normal bowel sounds; soft; non-distended; non-tender   EXT: Normal ROM. No calf tenderness or edema. Distal pulses intact  NEURO: Alert, oriented. Grossly unremarkable. No focal deficits  SKIN: Skin exam is warm and dry

## 2022-06-02 NOTE — ED BEHAVIORAL HEALTH NOTE - BEHAVIORAL HEALTH NOTE
============    ED COURSE    ============    SOURCE: ATTILA Ge    ARRIVAL: Patient presented to the emergency room as walk in reporting having thoughts to harm a "bystander".     BEHAVIOR: While in the emergency room, patient presents as calm and cooperative. Patient is sleeping. Patient denied hallucinations. Patient has not required PO meds. Patient reports feeling depressed.     TREATMENT: Unknown     VISITORS: Denied visitors. Patient provided contact information for his mother, Jeimy (phone:[phone])    ========================    FOR EACH COLLATERAL    ========================    NAME: Jeimy     NUMBER: [phone]    RELATIONSHIP: Mother     RELIABILITY: N/A    COMMENTS: BTCM attempted outreach, however there was no answer. BTCM left voicemail requesting a call back and provided call back phone number.        ========================    FOR EACH COLLATERAL    ========================    NAME: Noris Abad     NUMBER: [phone]    RELATIONSHIP: Grandmother    RELIABILITY: N/A    COMMENTS: BTCM attempted outreach, however there was no answer. BTCM left voicemail requesting a call back and provided call back phone number.

## 2022-06-02 NOTE — ED PROVIDER NOTE - PROGRESS NOTE DETAILS
will obtain smart clearance labs, 1:1 placed, will consult telepsych when results back. PT cooperative, calm -CD Pt s/o to Dr. Lau to follow up psych consult, reassess and dispo. PT reassessed, resting comfortably, stable; telepsych attending contacted, has contacted nursing as well, obtaining collateral and then will call back to assess patient, telepsych monitor in PT room, 1:1 in place; will reassess shortly -CD Telepsych attending, Dr. Castelan evaluated patient and states that PT will need inpatient admission, no beds currently available at [facility], advise psychiatric hold until bed becomes available -CD Param: Case signed out to Dr. Solorzano Patient signed out to me from Dr. Virgen, resting comfortably, no acute distress.  Maintained on one-to-one, pending psychiatric bed Received pt from Dr Arndt at 0700 awaiting IPP bed. Pt was reassessed by psych team, still planning on admission when bed becomes available. Pt currently stable and comfortable. Pt endorsed to Dr Beauchamp to continue observation.

## 2022-06-02 NOTE — BH CONSULTATION LIAISON PROGRESS NOTE - NSBHCONSULTMEDAGITATION_PSY_A_CORE FT
Thorazine 50mg IM, Ativan 2mg IM prn agitation  Thorazine 50mg P.O Q 6 hours PRN , Ativan 2mg P.O Q 6 hours prn agitation
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
## 2022-06-02 NOTE — BH CONSULTATION LIAISON PROGRESS NOTE - NSBHASSESSMENTFT_PSY_ALL_CORE
Mr. Gracia is a 26-year-old  male, originally from [place], [place], recently terminated from employment, undomiciled, , no dependents, with reported psychiatric history of autism spectrum disorder, PTSD, bipolar disorder and substance use disorder (alcohol, cocaine, cannabis, ecstasy), multiple prior inpatient psychiatry admissions, most recent hospitalization was 4/30/22-5/3/22 at [facility]; currently in outpt tx (next appt 6/8/22), unclear past suicide attempts vs accidental overdose, presented to the ED for suicidal ideation and homicidal ideations. Originally evaluated by Telepsychiatry; reassessed by  psychiatry today.     Patient continues to endorse homicidal ideations toward woman from yesterday with intent and plan, but no longer reports suicidal ideations. His presentation appears precipitated by acute social stressors in the context of chronic predisposing factors including poor emotional and behavior regulation in light of Autism diagnosis, limited social support, trauma, and ongoing substance use.  He is noncompliant with psychiatric treatment. He continues to be a risk of harm to self and others and would benefit from inpatient psychiatric hospitalization for safety and stabilization. Discussed with patient the risks, benefits, and alternatives to initiating Depakote as mood stabilizer. Pt agreed to medical trial.     Plan:   1. Initiate Depakote 500mg, po, BID for mood stabilization.   2. Admit to inpatient psychiatry hospital for safety and stabilization upon bed availability. Completed legal form 9.39 and placed in patient's chart.   3. Continue constant observation for patient safety and unpredictable behavior.   4. For agitation, can give Chlorpromazine 50mg, IM, q 6hrs PRN and Ativan 2mg, IM, q 6hrs PRN. Hold if QTc > 500ms.      Psychiatry will continue to follow until completed transfer to inpatient psychiatry.  Mr. Gracia is a 26-year-old  male, originally from [place], [place], recently terminated from employment, undomiciled, , no dependents, with reported psychiatric history of autism spectrum disorder, PTSD, bipolar disorder and substance use disorder (alcohol, cocaine, cannabis, ecstasy), multiple prior inpatient psychiatry admissions, most recent hospitalization was 4/30/22-5/3/22 at [facility]; currently in outpt tx (next appt 6/8/22), unclear past suicide attempts vs accidental overdose, presented to the ED for suicidal ideation and homicidal ideations. Originally evaluated by Telepsychiatry; reassessed by  psychiatry today.     Patient continues to endorse homicidal ideations toward woman from yesterday with intent and plan to kill her, but no longer reports suicidal ideations. However, based on collateral information, patient has never attempted to kill anybody in the past . It seems that patient has a long history of behaviors issues in the context of the emotional dysregulation associated with his diagnosis of Autism Spectrum disorder. In addition, his non compliance and poor social support  associated poor coping skill seem to have contributed his current presentation.   Patient's  mood dysregulation , predisposes him to be a harm to self and others and would benefit from inpatient psychiatric hospitalization for safety and stabilization. Discussed with patient the risks, benefits, and alternatives to initiating Depakote as mood stabilizer. Pt agreed to medication because he has taken it before and he felt it was helpful.     Plan:   1. Initiate Depakote 500mg, po, BID for mood stabilization.   2. Admit to inpatient psychiatry hospital for safety and stabilization upon bed availability.   3. Continue constant observation for patient safety and unpredictable behavior.   4. For agitation, can give Chlorpromazine 50mg, IM, q 6hrs PRN and Ativan 2mg, IM, q 6hrs PRN. Hold if QTc > 500ms.      Psychiatry will continue to follow until completed transfer to inpatient psychiatry.  Mr. Gracia is a 26-year-old  male, originally from [place], [place], recently terminated from employment, undomiciled, , no dependents, with reported psychiatric history of autism spectrum disorder, PTSD, bipolar disorder and substance use disorder (alcohol, cocaine, cannabis, ecstasy), multiple prior inpatient psychiatry admissions, most recent hospitalization was 4/30/22-5/3/22 at [facility]; currently in outpt tx (next appt 6/8/22), unclear past suicide attempts vs accidental overdose, presented to the ED for suicidal ideation and homicidal ideations. Originally evaluated by Telepsychiatry; reassessed by  psychiatry today.     Patient continues to endorse homicidal ideations toward woman from yesterday with intent and plan to kill her, but no longer reports suicidal ideations. However, based on collateral information, patient has never attempted to kill anybody in the past . It seems that patient has a long history of behaviors issues in the context of the emotional dysregulation associated with his diagnosis of Autism Spectrum disorder. In addition, his non compliance and poor social support  associated poor coping skill seem to have contributed his current presentation.   Patient's  mood dysregulation , predisposes him to be a harm to self and others and would benefit from inpatient psychiatric hospitalization for safety and stabilization. Discussed with patient the risks, benefits, and alternatives to initiating Depakote as mood stabilizer. Pt agreed to medication because he has taken it before and he felt it was helpful.     Plan:   1. Initiate Depakote 500mg, po, BID for mood stabilization.   2. Admit to inpatient psychiatry hospital for safety and stabilization upon bed availability.   3. Continue constant observation for patient safety and unpredictable behavior.   4. For agitation, can give Chlorpromazine 50mg, P.O, q 6hrs PRN and Ativan 2mg, P.O, q 6hrs PRN. Hold if QTc > 500ms.      Psychiatry will continue to follow until completed transfer to inpatient psychiatry.
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
## 2022-06-03 LAB
AMPHET UR-MCNC: NEGATIVE — SIGNIFICANT CHANGE UP
BARBITURATES UR SCN-MCNC: NEGATIVE — SIGNIFICANT CHANGE UP
BENZODIAZ UR-MCNC: NEGATIVE — SIGNIFICANT CHANGE UP
COCAINE METAB.OTHER UR-MCNC: NEGATIVE — SIGNIFICANT CHANGE UP
METHADONE UR-MCNC: NEGATIVE — SIGNIFICANT CHANGE UP
OPIATES UR-MCNC: NEGATIVE — SIGNIFICANT CHANGE UP
PCP SPEC-MCNC: SIGNIFICANT CHANGE UP
PROPOXYPHENE QUALITATIVE URINE RESULT: NEGATIVE — SIGNIFICANT CHANGE UP

## 2022-06-03 PROCEDURE — 99232 SBSQ HOSP IP/OBS MODERATE 35: CPT

## 2022-06-03 RX ADMIN — DIVALPROEX SODIUM 500 MILLIGRAM(S): 500 TABLET, DELAYED RELEASE ORAL at 05:44

## 2022-06-03 RX ADMIN — DIVALPROEX SODIUM 500 MILLIGRAM(S): 500 TABLET, DELAYED RELEASE ORAL at 17:40

## 2022-06-03 NOTE — BH CONSULTATION LIAISON PROGRESS NOTE - NSBHINDICATION_PSY_ALL_CORE
Unpredictable behavior, homicidal ideations 
Needs 1 to 1 while in the ED however does not need 1 to 1 on IPP

## 2022-06-03 NOTE — BH CONSULTATION LIAISON PROGRESS NOTE - NSBHCONSULTMEDAGITATION_PSY_A_CORE FT
For agitation, can give Chlorpromazine 50mg P.O  q 6hrs PRN and Ativan 2mg, P.O  q 6hrs PRN. Can be given via the intramuscular route if patient is severely agitated and is considered a danger to herself or others. Hold if QTc > 500ms.

## 2022-06-03 NOTE — BH CONSULTATION LIAISON PROGRESS NOTE - NSBHCHARTREVIEWVS_PSY_A_CORE FT
Vital Signs Last 24 Hrs  T(C): 36.6 (02 Jun 2022 08:28), Max: 36.6 (02 Jun 2022 08:28)  T(F): 97.9 (02 Jun 2022 08:28), Max: 97.9 (02 Jun 2022 08:28)  HR: 76 (02 Jun 2022 08:28) (70 - 76)  BP: 109/60 (02 Jun 2022 08:28) (109/60 - 131/64)  BP(mean): --  RR: 18 (02 Jun 2022 08:28) (18 - 18)  SpO2: 98% (02 Jun 2022 08:28) (97% - 99%)
Vital Signs Last 24 Hrs  T(C): 35.8 (03 Jun 2022 07:46), Max: 36.3 (03 Jun 2022 04:56)  T(F): 96.5 (03 Jun 2022 07:46), Max: 97.4 (03 Jun 2022 04:56)  HR: 63 (03 Jun 2022 07:46) (58 - 63)  BP: 120/56 (03 Jun 2022 07:46) (100/57 - 120/56)  BP(mean): --  RR: 18 (03 Jun 2022 07:46) (18 - 18)  SpO2: 98% (03 Jun 2022 07:46) (98% - 98%)

## 2022-06-03 NOTE — BH CONSULTATION LIAISON PROGRESS NOTE - NSBHCONSULTPRIMARYDISCUSSYES_PSY_A_CORE FT
Plan to admit to inpatient psychiatry, continuation of Depakote, and agitation prn meds. 
Discussed with primary team plan to admit to inpatient psychiatry upon bed availability, as well as, recommendations and rationale for initiation of Depakote trial and PRN agitation meds.

## 2022-06-03 NOTE — H&P ADULT - NSHPPHYSICALEXAM_GEN_ALL_CORE
Vital Signs Last 24 Hrs  T(C): 37 (03 Jun 2022 15:53), Max: 37 (03 Jun 2022 15:53)  T(F): 98.6 (03 Jun 2022 15:53), Max: 98.6 (03 Jun 2022 15:53)  HR: 66 (03 Jun 2022 15:53) (58 - 66)  BP: 106/51 (03 Jun 2022 15:53) (100/57 - 120/56)  BP(mean): --  RR: 18 (03 Jun 2022 15:53) (18 - 18)  SpO2: 97% (03 Jun 2022 15:53) (97% - 98%)

## 2022-06-03 NOTE — BH CONSULTATION LIAISON PROGRESS NOTE - NSBHCHARTREVIEWINVESTIGATE_PSY_A_CORE FT
6/2/2022 11:14:13 AM  Ventricular Rate 60 BPM  Atrial Rate 60 BPM  P-R Interval 138 ms  QRS Duration 82 ms  Q-T Interval 386 ms  QTC Calculation(Bazett) 386 ms  P Axis 51 degrees  R Axis 40 degrees  T Axis 32 degrees  Diagnosis Line Normal sinus rhythm  Normal ECG
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
## 2022-06-03 NOTE — H&P ADULT - NSHPLABSRESULTS_GEN_ALL_CORE
14.7   5.75  )-----------( 284      ( 02 Jun 2022 02:19 )             45.2   06-02    144  |  107  |  20  ----------------------------<  135<H>  4.3   |  19  |  0.9    Ca    9.5      02 Jun 2022 02:19    TPro  6.9  /  Alb  4.7  /  TBili  <0.2  /  DBili  x   /  AST  23  /  ALT  17  /  AlkPhos  72  06-02

## 2022-06-03 NOTE — BH CONSULTATION LIAISON PROGRESS NOTE - CASE SUMMARY
Mr. Gracia is a 26 year old man with a history of  Substance induced Mood disorder , Substance use disorder , Autism Spectrum disorder who presented to the ED for the evaluation of suicidal and homicidal ideations.    Patient was initially seen by the telepsychiatry team .   Patient appears less irritable , no longer reports having homicidal or suicide ideations. It appears that patient feels safe in the hospital and seems to be able to regulate his emotions better in this setting. He however continues to have depressed mood , hopelessness or hopelessness, poor coping skills and unpredictable impulse control. Patient seems to have tolerated the Depakote with no side effects.   At this time, patient continues to be  considered a danger to himself and others  need inpatient psychiatric hospitalization for medication management and symptom stabilization. We recommend continuing Depakote 500mg P.O BID for mood lability  and aggressive behavior. Patient will benefit from Thorazine 50mg p.o Q 6 hours PRN in combination with Ativan 2mg P.O Q 6 hours PRN for agitation. [facility] psychiatry team will follow. 
Mr. Gracia is a 26 year old man with a history of  Substance induced Mood disorder , Substance use disorder , Autism Spectrum disorder who presented to the ED for the evaluation of suicidal and homicidal ideations.    Patient was initially seen by the telepsychiatry team .   Patient appears to have very poor coping skills and poor frustration tolerance which predisposes to significant mood lability and aggressive behavior. he seems to have chronic difficulty in regulating his emotions and disinhibitions which is behavior not uncommon in patients with Autism spectrum disorder. In addition, his continued substance use confounds the picture because of the associated mood instability that can come with illicit substance use.   At this time, patient is considered a danger to himself and other s continues to need inpatient psychiatric hospitalization for medication management and symptom stabilization. Ordinarily, Autism spectrum disorder is not amenable to the services provided by the inpatient psychiatry floor and such patient's can tend to do poorly in such a restrictive setting. However , patient's mood symptoms and his aggression seems to have responded in the past to Depakote and he has been able to function somewhat independently. On discharge, he will benefit from outpatient psychiatry follow up that will target his coping skills , frustration tolerance and ultimately his behavior. For now , we recommend discontinuing Seroquel since patient is reportedly overly drowsy on this medication. Instead, we recommend starting Depakote 500mg P.O BID for mood lability  and aggressive behavior. Patient will benefit from Thorazine 50mg p.o Q 6 hours PRN in combination with Ativan 2mg P.O Q 6 hours PRN for agitation.  The  psychiatry team will continue to follow.

## 2022-06-03 NOTE — ED ADULT NURSE REASSESSMENT NOTE - NS ED NURSE REASSESS COMMENT FT1
Pt A&Ox3, denies homicidal and suicidal ideations at this time. Denies auditory and visual hallucination. 1:1 ongoing

## 2022-06-03 NOTE — BH CONSULTATION LIAISON PROGRESS NOTE - NSBHCONSULTRECOMMENDOTHER_PSY_A_CORE FT
Plan:   1) Admit to inpatient psychiatry upon bed availability  2) Continue Depakote 500mg po BID for mood stabilization.   3) Continue constant observation for patient safety and unpredictable behavior  4. For agitation, can give Chlorpromazine 50mg, IM, q 6hrs PRN and Ativan 2mg, IM, q 6hrs PRN. Hold if QTc > 500ms.  Plan:   1) Admit to inpatient psychiatry upon bed availability  2) Continue Depakote 500mg po BID for mood stabilization.   3) Check Depakote level on Monday 6/6/22

## 2022-06-03 NOTE — BH CONSULTATION LIAISON PROGRESS NOTE - NSBHASSESSMENTFT_PSY_ALL_CORE
Mr. Gracia is a 26-year-old  male, originally from [place], [place], recently terminated from employment, undomiciled, , no dependents, with reported psychiatric history of autism spectrum disorder, PTSD, bipolar disorder and substance use disorder (alcohol, cocaine, cannabis, ecstasy), multiple prior inpatient psychiatry admissions, most recent hospitalization was 4/30/22-5/3/22 at [facility]; currently in outpt tx (next appt 6/8/22), unclear past suicide attempts vs accidental overdose, presented to the ED for suicidal ideation and homicidal ideations.          Pt continues to endorse feelings of hopelessness and frustration. However, at this time he denies any homicidal or suicidal ideations. Depakote 500mg, po, BID was initiated with 2 completed doses. Pt reports that he is tolerating medication well with no reported side effects. From collateral yesterday, it seems that patient has a long history of behaviors issues in the context of the emotional dysregulation associated with his diagnosis of Autism Spectrum disorder. In addition, his non compliance and poor social support  associated poor coping skill seem to have contributed his current presentation. Patient's  mood dysregulation , predisposes him to be a harm to self and others and would benefit from inpatient psychiatric hospitalization for safety and stabilization.  Mr. Gracia is a 26-year-old  male, originally from [place], [place], recently terminated from employment, undomiciled, , no dependents, with reported psychiatric history of autism spectrum disorder, PTSD, bipolar disorder and substance use disorder (alcohol, cocaine, cannabis, ecstasy), multiple prior inpatient psychiatry admissions, most recent hospitalization was 4/30/22-5/3/22 at [facility]; currently in outpt tx (next appt 6/8/22), unclear past suicide attempts vs accidental overdose, presented to the ED for suicidal ideation and homicidal ideations.          Pt continues to endorse feelings of hopelessness and frustration. However, at this time he denies any homicidal or suicidal ideations. Depakote 500mg, po, BID was initiated with 2 completed doses. Pt reports that he is tolerating medication well with no reported side effects. From collateral yesterday, it seems that patient has a long history of behaviors issues in the context of the emotional dysregulation associated with his diagnosis of Autism Spectrum disorder and likely substance use . In addition, his non compliance and poor social support  associated poor coping skill seem to have contributed his current presentation. Patient's  mood dysregulation , unpredictable impulse control predisposes him to be a harm to self and others and would benefit from inpatient psychiatric hospitalization for safety and stabilization.
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
## 2022-06-03 NOTE — BH CONSULTATION LIAISON PROGRESS NOTE - CURRENT MEDICATION
MEDICATIONS  (STANDING):  diVALproex  milliGRAM(s) Oral two times a day    MEDICATIONS  (PRN):  chlorproMAZINE    Injectable 50 milliGRAM(s) IntraMuscular every 6 hours PRN agitation  LORazepam   Injectable 2 milliGRAM(s) IntraMuscular every 6 hours PRN Agitation  
MEDICATIONS  (STANDING):    MEDICATIONS  (PRN):

## 2022-06-04 DIAGNOSIS — F12.20 CANNABIS DEPENDENCE, UNCOMPLICATED: ICD-10-CM

## 2022-06-04 PROCEDURE — 99232 SBSQ HOSP IP/OBS MODERATE 35: CPT | Mod: GC

## 2022-06-04 RX ORDER — CHLORPROMAZINE HCL 10 MG
50 TABLET ORAL EVERY 6 HOURS
Refills: 0 | Status: DISCONTINUED | OUTPATIENT
Start: 2022-06-04 | End: 2022-06-08

## 2022-06-04 RX ORDER — INFLUENZA VIRUS VACCINE 15; 15; 15; 15 UG/.5ML; UG/.5ML; UG/.5ML; UG/.5ML
0.5 SUSPENSION INTRAMUSCULAR ONCE
Refills: 0 | Status: COMPLETED | OUTPATIENT
Start: 2022-06-04 | End: 2022-06-04

## 2022-06-04 RX ADMIN — DIVALPROEX SODIUM 500 MILLIGRAM(S): 500 TABLET, DELAYED RELEASE ORAL at 20:04

## 2022-06-04 RX ADMIN — DIVALPROEX SODIUM 500 MILLIGRAM(S): 500 TABLET, DELAYED RELEASE ORAL at 08:11

## 2022-06-04 NOTE — PROGRESS NOTE BEHAVIORAL HEALTH - NSBHFUPINTERVALHXFT_PSY_A_CORE
Patient was seen, interviewed, and chart was reviewed. As per nursing staff, no events reported overnight. Patient did not require any PRNs and remains calm/in good behavioral control on the unit. He reports feeling "better" today, and reiterates his story that he reported while in the ED. He  notes his frustration with ongoing unstable housing and issues with work. He reports not being on his medications (Gabapentin, Seroquel) since January, 2022. He endorses adequate sleep and appetite thus far on the unit. He denies any suicidal or homicidal thoughts/intent/plans. He denies any auditory/visual hallucinations or paranoia. He has been taking his medications and denies any side effects.

## 2022-06-04 NOTE — PATIENT PROFILE BEHAVIORAL HEALTH - NSBHSCDSCRN_PSY_A_CORE
male/ages 15-35/family history of suicide/chronic medical illness/psychiatric illness/substance abuse
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
## 2022-06-04 NOTE — PATIENT PROFILE BEHAVIORAL HEALTH - FALL HARM RISK - UNIVERSAL INTERVENTIONS
Bed in lowest position, wheels locked, appropriate side rails in place/Call bell, personal items and telephone in reach/Instruct patient to call for assistance before getting out of bed or chair/Non-slip footwear when patient is out of bed/[place] to call system/Physically safe environment - no spills, clutter or unnecessary equipment/Purposeful Proactive Rounding/Room/bathroom lighting operational, light cord in reach

## 2022-06-04 NOTE — PROGRESS NOTE BEHAVIORAL HEALTH - SUMMARY
Mr. Gracia is a 27yo M with a reported PPH of Bipolar d/o, Intermittent explosive d/o, PTSD, ASD, substance use disorder (primarily cannabis), who presented to the ED with suicidal and homicidal ideations.    Today, patient denies any suicidal/homicidal thoughts any longer. He continues to express some depressive symptoms, which may be influenced by his recent medication noncompliance and recurrent substance use. He would benefit from further inpatient treatment for safety and stabilization.     Bipolar d/o vs substance-induced mood episode:  - Continue Depakote 500mg P.O BID for mood lability and aggressive behavior.  - For agitation, patient will benefit from Thorazine 50mg p.o Q 6 hours PRN in combination with Ativan 2mg P.O Q 6 hours PRN for agitation if patient does not respond to redirection first. Mr. Gracia is a 25yo M with a reported PPH of Bipolar d/o, Intermittent explosive d/o, PTSD, ASD, substance use disorder (primarily cannabis), who presented to the ED with suicidal and homicidal ideations.    Today, patient denies any suicidal/homicidal thoughts any longer. He continues to express some depressive symptoms, which may be influenced by his recent medication noncompliance and recurrent substance use. He would benefit from further inpatient treatment for safety and stabilization.     Bipolar d/o vs substance-induced mood episode:  - Continue Depakote 500mg P.O BID for mood lability and aggressive behavior. Level on 6/6.  - For agitation, patient will benefit from Thorazine 50mg p.o Q 6 hours PRN in combination with Ativan 2mg P.O Q 6 hours PRN for agitation if patient does not respond to redirection first.

## 2022-06-05 PROCEDURE — 99232 SBSQ HOSP IP/OBS MODERATE 35: CPT

## 2022-06-05 RX ADMIN — DIVALPROEX SODIUM 500 MILLIGRAM(S): 500 TABLET, DELAYED RELEASE ORAL at 08:08

## 2022-06-05 RX ADMIN — DIVALPROEX SODIUM 500 MILLIGRAM(S): 500 TABLET, DELAYED RELEASE ORAL at 20:04

## 2022-06-05 RX ADMIN — Medication 2 MILLIGRAM(S): at 21:39

## 2022-06-05 RX ADMIN — Medication 2 MILLIGRAM(S): at 08:50

## 2022-06-05 NOTE — PROGRESS NOTE BEHAVIORAL HEALTH - SUMMARY
Mr. Gracia is a 25yo M with a reported PPH of Bipolar d/o, Intermittent explosive d/o, PTSD, ASD, substance use disorder (primarily cannabis), who presented to the ED with suicidal and homicidal ideations.    Today, patient denies any suicidal/homicidal thoughts any longer. He continues to express some depressive symptoms, which may be influenced by his recent medication noncompliance and recurrent substance use. Pt continues to report low frustration tolerance and poor impulse control which he reports has responded to medication previously. He would benefit from further inpatient treatment for safety and stabilization.     Bipolar d/o vs substance-induced mood episode:  - Continue Depakote 500mg P.O BID for mood lability and aggressive behavior. Level on 6/6.  - For agitation, patient will benefit from Thorazine 50mg p.o Q 6 hours PRN in combination with Ativan 2mg P.O Q 6 hours PRN for agitation if patient does not respond to redirection first.
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
## 2022-06-06 DIAGNOSIS — F39 UNSPECIFIED MOOD [AFFECTIVE] DISORDER: ICD-10-CM

## 2022-06-06 PROCEDURE — 99232 SBSQ HOSP IP/OBS MODERATE 35: CPT

## 2022-06-06 RX ORDER — HYDROXYZINE HCL 10 MG
50 TABLET ORAL EVERY 6 HOURS
Refills: 0 | Status: DISCONTINUED | OUTPATIENT
Start: 2022-06-06 | End: 2022-06-08

## 2022-06-06 RX ADMIN — DIVALPROEX SODIUM 500 MILLIGRAM(S): 500 TABLET, DELAYED RELEASE ORAL at 08:08

## 2022-06-06 RX ADMIN — Medication 2 MILLIGRAM(S): at 10:57

## 2022-06-06 RX ADMIN — Medication 50 MILLIGRAM(S): at 22:16

## 2022-06-06 RX ADMIN — DIVALPROEX SODIUM 500 MILLIGRAM(S): 500 TABLET, DELAYED RELEASE ORAL at 20:14

## 2022-06-06 RX ADMIN — Medication 50 MILLIGRAM(S): at 12:17

## 2022-06-06 NOTE — DISCHARGE NOTE BEHAVIORAL HEALTH - NSBHDCRESPONSEFT_PSY_A_CORE
Patient denied any suicidal or homicidal ideations. Patient denied any auditory or visual hallucinations. Patient is future oriented, optimistic and motivated to participate in Outpatient treatment.  Patient understands and verbalized agreement with treatment plan and following up with outpatient. Psychoeducation provided regarding diagnosis, medications, treatment and follow up. Risks, benefits, alternatives discussed, all questions and concerns addressed and answered. Patient denied any suicidal or homicidal ideations. Patient denied any auditory or visual hallucinations. Patient is future oriented.  Patient understands and verbalized agreement with treatment plan and following up with outpatient. Psychoeducation provided regarding diagnosis, medications, treatment and follow up. Risks, benefits, alternatives discussed, all questions and concerns addressed and answered.

## 2022-06-06 NOTE — PROGRESS NOTE BEHAVIORAL HEALTH - NSBHADDHXSUBSTFT_PSY_A_CORE
Reports - ETOH, cocaine, ecstasy, cannabis use. ETOH was last 1-2 months ago. Cocaine last used 4 weeks ago; E pills last used yesterday; and cannabis use was yesterday - 4 joints. He reports smoking 3-4 "plants" of cannabis daily. No history of detox or rehab.
Reports - ETOH, cocaine, ecstasy, cannabis use. ETOH was last 1-2 months ago. Cocaine last used 4 weeks ago; E pills last used yesterday; and cannabis use was yesterday - 4 joints. He reports smoking 3-4 "plants" of cannabis daily. No history of detox or rehab.
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
## 2022-06-06 NOTE — DISCHARGE NOTE BEHAVIORAL HEALTH - NSBHDCSUBSTHXFT_PSY_A_CORE
Reports - ETOH, cocaine, ecstasy, cannabis use. ETOH was last 1-2 months ago. Cocaine last used 4 weeks ago; E pills last used yesterday; and cannabis use was yesterday - 4 joints. He reports smoking 3-4 "plants" of cannabis daily. No history of detox or rehab
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
## 2022-06-06 NOTE — PROGRESS NOTE BEHAVIORAL HEALTH - NSBHFUPINTERVALHXFT_PSY_A_CORE
Patient seen and evaluated on IPP. As per nursing report PRN for agitation for with good effect. On approach patient calm and cooperative. No agitation or aggression noted. Patient in good behavioral control. Patient states he came into the ED because he wanted to "hurt" someone and himself. States he loss his job and was upset and also had an issue with a woman and threatened her. States he no longer feels this way. Denies suicidal/homicidal ideations at this time. States he feels the medication is working, "it makes me feel more mellow". Denies having access to any weapons. Denies A/V hallucinations. States he uses marijuana but has not used any other drug since last admission. Denies any ETOH use. States he did not take hi meds when discharged. States did not follow up with [facility] but still has appointment scheduled with Dr. Wilkes on June 8th. States his uncle kicked him out after last admission. States he went to a shelter but did not like it. Patient already requesting discharge. As with previous admission patient is non compliant with medication. When patient has an issue with housing will come to the ED presenting with SI and HI. After a few days on the unit patient with start to cause trouble and request discharge. Patient has chronic issues with mood lability and a hx of impulsivity and aggressive behavior. He has difficulty regulating his emotions which is chronic and may be 2/2 patient being on the Autism spectrum. Patient seen and evaluated on IPP. As per nursing report PRN for agitation for with good effect. On approach patient calm and cooperative. No agitation or aggression noted. Patient in good behavioral control. Patient states he came into the ED because he wanted to "hurt" someone and himself. States he loss his job and was upset and also had an issue with a woman and threatened her. States he no longer feels this way. Denies suicidal/homicidal ideations at this time. States he feels the medication is working, "it makes me feel more mellow". Denies having access to any weapons. Denies A/V hallucinations. States he uses marijuana but has not used any other drug since last admission. Denies any ETOH use. States he did not take hi meds when discharged. States did not follow up with [facility] but still has appointment scheduled with Dr. Wilkes on June 8th. States his uncle kicked him out after last admission. States he went to a shelter but did not like it. Patient already requesting discharge. As with previous admission patient is non compliant with medication. When patient has an issue with housing will come to the ED presenting with SI and HI. After a few days on the unit patient with start to cause trouble and request discharge. Patient has chronic issues with mood lability and a hx of impulsivity and aggressive behavior. He has difficulty regulating his emotions which is chronic and may be 2/2 patient being on the Autism spectrum.    Writer later notified patient easily agitated; flirtatious with female staff and patients. Patient offered and accepted po Ativan. Patient seen and evaluated on IPP. As per nursing report PRN for agitation for with good effect. On approach patient calm and cooperative. No agitation or aggression noted. Patient in good behavioral control. Patient states he came into the ED because he wanted to "hurt" someone and himself. States he loss his job and was upset and also had an issue with a woman and threatened her. States he no longer feels this way. Denies suicidal/homicidal ideations at this time. States he feels the medication is working, "it makes me feel more mellow". Denies having access to any weapons. Denies A/V hallucinations. States he uses marijuana but has not used any other drug since last admission. Denies any ETOH use. States he did not take hi meds when discharged. [facility] did not follow up with [facility] but still has appointment scheduled with Dr. Wilkes on June 8th. States his uncle kicked him out after last admission. States he went to a shelter but did not like it. Patient already requesting discharge. As with previous admission patient is non compliant with medication. When patient has an issue with housing will come to the ED presenting with SI and HI. After a few days on the unit patient with start to cause trouble and request discharge. Patient has chronic issues with mood lability and a hx of impulsivity and aggressive behavior. He has difficulty regulating his emotions which is chronic and may be 2/2 patient being on the Autism spectrum.    Writer later notified patient easily agitated; flirtatious with female staff and patients. Patient offered and accepted po Ativan.    Spoke to Dr. Wilkes [phone] briefly. [facility] has not seen patient for a long time. Hx mood disorder, substance use. Schedules appointments then ends up in the hospital. Patient seen and evaluated on IPP. As per nursing report PRN for agitation for with good effect. On approach patient calm and cooperative. No agitation or aggression noted. Patient in good behavioral control. Patient states he came into the ED because he wanted to "hurt" someone and himself. States he loss his job and was upset and also had an issue with a woman and threatened her. States he no longer feels this way. Denies suicidal/homicidal ideations at this time. States he feels the medication is working, "it makes me feel more mellow". Denies having access to any weapons. Denies A/V hallucinations. States he uses marijuana but has not used any other drug since last admission. Denies any ETOH use. States he did not take hi meds when discharged. [facility] did not follow up with [facility] but still has appointment scheduled with Dr. Wilkes on June 8th. States his uncle kicked him out after last admission. States he went to a shelter but did not like it. Patient already requesting discharge. As with previous admission patient is non compliant with medication. When patient has an issue with housing will come to the ED presenting with SI and HI. After a few days on the unit patient with start to cause trouble and request discharge. Patient gave writer permission to talk to  Dillon [phone]. States this uncle may let him stay with him. Patient has chronic issues with mood lability and a hx of impulsivity and aggressive behavior. He has difficulty regulating his emotions which is chronic and may be 2/2 patient being on the Autism spectrum.    Writer later notified patient easily agitated; flirtatious with female staff and patients. Patient offered and accepted po Ativan.    Spoke to Dr. Wilkes [phone] briefly. [facility] has not seen patient for a long time. Hx mood disorder, substance use. Schedules appointments then ends up in the hospital.    Confirmed with [facility] [phone] (which everything transferred to [address]), Patient never picked  up meds after discharge.    Placed call to patients uncle Justinorique [phone], Number not working. Patient seen and evaluated on IPP. As per nursing report PRN for agitation with good effect. On approach patient calm and cooperative. No agitation or aggression noted. Patient in good behavioral control. Patient states he came into the ED because he wanted to "hurt" someone and himself. States he loss his job and was upset and also had an issue with a woman and threatened her. States he no longer feels this way. Denies suicidal/homicidal ideations at this time. States he feels the medication is working, "it makes me feel more mellow". Denies having access to any weapons. Denies A/V hallucinations. States he uses marijuana but has not used any other drugs since last admission. Denies any ETOH use. States he did not take his meds when discharged. [facility] not [facility] up with [facility] but still has appointment scheduled with Dr. Wilkes on June 8th. States his uncle kicked him out after last admission. States he went to a shelter but did not like it. Patient already requesting discharge. As with previous admissions patient is non compliant with medication. When patient has an issue with housing will come to the ED presenting with SI and HI. After a few days on the unit patient will start to cause trouble and request discharge. Patient gave writer permission to talk to  Dillon [phone]. States this uncle may let him stay with him. Patient has chronic issues with mood lability and a hx of impulsivity and aggressive behavior. He has difficulty regulating his emotions which is chronic and may be 2/2 patient being on the Autism spectrum.    Writer later notified patient easily agitated; flirtatious with female staff and patients. Patient offered and accepted po Ativan.    Spoke to Dr. Wilkes [phone] briefly. Confirmed had an appointment scheduled for June 8th. [facility] has not seen patient for a long time. Hx mood disorder, substance use. Schedules appointments then ends up in the hospital.    Confirmed with [facility] [phone] (which everything transferred to [address]), Patient never picked  up meds after discharge.    Placed call to patients  Dillon [phone], Number not working.

## 2022-06-06 NOTE — PROGRESS NOTE BEHAVIORAL HEALTH - SUMMARY
26-year-old  male, originally from [place], [place], employed, living w/ uncle or friend vs roommate in [place], , no dependents, with reported psychiatric history of suspected autism spectrum disorder, PTSD, bipolar disorder and substance use disorder (alcohol, cocaine, cannabis, ecstasy), multiple prior inpatient psychiatry admissions, in the setting of substance intoxication and overdose, most recent hospitalization was 4/11/22-4/13/22 at [facility]; currently in outpt tx (reports next appointment in June), unclear past suicide attempts vs accidental overdose, presenting to the ED brought in by self for suicidal ideation and homicidal ideations.     mood episode:  - Continue Depakote 500mg P.O BID for mood lability and aggressive behavior. Level on 6/6.  - For agitation, patient will benefit from Thorazine 50mg p.o Q 6 hours PRN in combination with Ativan 2mg P.O Q 6 hours PRN for agitation if patient does not respond to redirection first. 26-year-old  male, originally from [place], [place], employed, living w/ uncle or friend vs roommate in [place], , no dependents, with reported psychiatric history of suspected autism spectrum disorder, PTSD, bipolar disorder and substance use disorder (alcohol, cocaine, cannabis, ecstasy), multiple prior inpatient psychiatry admissions, in the setting of substance intoxication and overdose, most recent hospitalization was 4/11/22-4/13/22 at [facility]; currently in outpt tx (reports next appointment in June), unclear past suicide attempts vs accidental overdose, presenting to the ED brought in by self for suicidal ideation and homicidal ideations.     #Admit    #Mood Disorder  -Depakote 500mg P.O BID     -Thorazine 50mg Q6 PRN for agitation/aggression  -Lorazepam 2mg Q6 PRN for aggression/agitation  -Hydroxyzine 50mg Q6 PRN for anxiety/insomnia    - For agitation, patient will benefit from Thorazine 50mg p.o Q 6 hours PRN in combination with Ativan 2mg P.O Q 6 hours PRN for agitation if patient does not respond to redirection first. Escalation to Thorazine 50mg IM if pt is a danger to self or/and others with repeat EKG to ensure QTc <500 ms.

## 2022-06-06 NOTE — DISCHARGE NOTE BEHAVIORAL HEALTH - NSBHDCSIGEVENTSFT_PSY_A_CORE
Patient creating issues on the unit. Patient very flirtatious with female staff. Appears to get upset when attempts made to re-direct him. Patient makes derogatory statements regarding the female staff and racial slurs regarding male staff.

## 2022-06-06 NOTE — DISCHARGE NOTE BEHAVIORAL HEALTH - NSBHDCREFEROTHERFT_PSY_A_CORE
Patient refused substance use referral  [facility] [phone] provided as an additional resource

## 2022-06-06 NOTE — DISCHARGE NOTE BEHAVIORAL HEALTH - MEDICATION SUMMARY - MEDICATIONS TO STOP TAKING
I will STOP taking the medications listed below when I get home from the hospital:    QUEtiapine 50 mg oral tablet  -- 1 tab(s) by mouth once a day (at bedtime) x 30 days.  Continue until told otherwise by your provider.
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
## 2022-06-06 NOTE — DISCHARGE NOTE BEHAVIORAL HEALTH - NS MD DC FALL RISK RISK
For information on Fall & Injury Prevention, visit: [email or web address] OR  [email or web address] OR  https://www.cdc.gov/steadi/patient.html

## 2022-06-06 NOTE — PROGRESS NOTE BEHAVIORAL HEALTH - NSBHADDHXFAMFT_PSY_A_CORE
Reports family history of suicide - cousin (jumped in front of train as per ED note). Substance use in family as well.
Reports family history of suicide - cousin (jumped in front of train as per ED note). Substance use in family as well
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
## 2022-06-06 NOTE — DISCHARGE NOTE BEHAVIORAL HEALTH - NSBHDCSUICFCTRMIT_PSY_A_CORE
Has been medication compliant, not endorsing any side effects. Patient is future oriented and optimistic about starting Outpatient. Patient verbalizes reasons for living. Patient to use positive coping skills learned during the course of the inpatient hospitalization. Patient verbalized willingness to seek care in moment of crisis. Patient is agreeable that should he have any thoughts of hurting himself or others, he will call 911, return to the ED or call the National Suicide Prevention Lifeline, immediately. Has been medication compliant, not endorsing any side effects. Patient is future oriented. Patient verbalizes reasons for living. Patient to use positive coping skills learned during the course of the inpatient hospitalization. Patient verbalized willingness to seek care in moment of crisis. Patient is agreeable that should he have any thoughts of hurting himself or others, he will call 911, return to the ED or call the National Suicide Prevention Lifeline, immediately.
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
## 2022-06-06 NOTE — DISCHARGE NOTE BEHAVIORAL HEALTH - NSBHDCADMRISKREASONS_PSY_A_CORE
High utilizer of Inpateint/ED services/Poor engagement in outpt treatment/Non-adherence with medications/Co-occur mental health and subst use

## 2022-06-06 NOTE — DISCHARGE NOTE BEHAVIORAL HEALTH - MEDICATION SUMMARY - MEDICATIONS TO TAKE
I will START or STAY ON the medications listed below when I get home from the hospital:    divalproex sodium 500 mg oral delayed release tablet  -- 1 tab(s) by mouth 2 times a day x 14 days. Continue until told otherwise by your provider.  -- Indication: For Mood Lability
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
## 2022-06-06 NOTE — DISCHARGE NOTE BEHAVIORAL HEALTH - NSBHDCVIOLPROTECTFT_PSY_A_CORE
Patient denies suicidal ideations. Responsibility to family and others, Identifies reasons for living, Has future plans.

## 2022-06-06 NOTE — PROGRESS NOTE BEHAVIORAL HEALTH - NSBHADMITIPBHPROVFT_PSY_A_CORE
Unable to reach Dr. Wilkes [phone] Unable to reach Dr. Wilkes [phone] Spoke to Dr. Wilkes [phone] Spoke to Dr. Wilkes [phone]

## 2022-06-06 NOTE — DISCHARGE NOTE BEHAVIORAL HEALTH - NSBHDCDXVALIDYESFT_PSY_A_CORE
Primary Dx Mood disorder F39. Secondary Dx 1 Cannabis use disorder, moderate, dependence F12.20. Secondary Dx 2 Autism disorder F84.0. Secondary Dx 3 Intermittent explosive disorder F63.81. Primary Dx Mood disorder F39, Cannabis use disorder, moderate, dependence F12.20, Intermittent explosive disorder F63.81.

## 2022-06-06 NOTE — DISCHARGE NOTE BEHAVIORAL HEALTH - NSBHDCSWCOMMENTSFT_PSY_A_CORE
Discharge summary to be faxed to [facility] [phone] Discharge summary faxed to [facility] [phone] on 6/8 at 10am

## 2022-06-07 PROCEDURE — 99231 SBSQ HOSP IP/OBS SF/LOW 25: CPT

## 2022-06-07 RX ORDER — DIVALPROEX SODIUM 500 MG/1
1 TABLET, DELAYED RELEASE ORAL
Qty: 28 | Refills: 0
Start: 2022-06-07 | End: 2022-06-20

## 2022-06-07 RX ADMIN — Medication 50 MILLIGRAM(S): at 22:24

## 2022-06-07 RX ADMIN — Medication 50 MILLIGRAM(S): at 07:55

## 2022-06-07 RX ADMIN — Medication 50 MILLIGRAM(S): at 16:32

## 2022-06-07 RX ADMIN — Medication 2 MILLIGRAM(S): at 16:32

## 2022-06-07 RX ADMIN — Medication 2 MILLIGRAM(S): at 07:15

## 2022-06-07 RX ADMIN — DIVALPROEX SODIUM 500 MILLIGRAM(S): 500 TABLET, DELAYED RELEASE ORAL at 08:04

## 2022-06-07 RX ADMIN — Medication 2 MILLIGRAM(S): at 22:24

## 2022-06-07 RX ADMIN — DIVALPROEX SODIUM 500 MILLIGRAM(S): 500 TABLET, DELAYED RELEASE ORAL at 21:13

## 2022-06-07 RX ADMIN — Medication 50 MILLIGRAM(S): at 21:12

## 2022-06-07 NOTE — PROGRESS NOTE BEHAVIORAL HEALTH - NSBHATTESTSEENBY_PSY_A_CORE
NP without Attending Psychiatrist
attending Psychiatrist without NP/Trainee
Attending Psychiatrist supervising NP/Trainee, meeting pt...
NP without Attending Psychiatrist

## 2022-06-07 NOTE — PROGRESS NOTE BEHAVIORAL HEALTH - RISK ASSESSMENT
modifiable risk factors and protective factors; comment on level of risk for dangerous behavior): Modifiable risk factors include psychiatric illness, impulsivity, recent psychosocial stressors, substance use/intoxication, lack of social support/living alone/single.  Static risk factors include male gender, family history of suicide.  Protective factors include seeking out treatment, employment, lack of psychotic/manic symptoms, lack of suicidality.
Modifiable risk factors include psychiatric illness, impulsivity, recent psychosocial stressors, substance use/intoxication, lack of social support/living alone/single.  Static risk factors include male gender, family history of suicide.  Protective factors include seeking out treatment, employment, lack of psychotic/manic symptoms, lack of suicidality.
Modifiable risk factors include psychiatric illness, impulsivity, recent psychosocial stressors, substance use/intoxication, lack of social support/living alone/single.  Static risk factors include male gender, family history of suicide.  Protective factors include seeking out treatment, employment, lack of psychotic/manic symptoms, lack of suicidality.
modifiable risk factors and protective factors; comment on level of risk for dangerous behavior): Modifiable risk factors include psychiatric illness, impulsivity, recent psychosocial stressors, substance use/intoxication, lack of social support/living alone/single.  Static risk factors include male gender, family history of suicide.  Protective factors include seeking out treatment, employment, lack of psychotic/manic symptoms, lack of suicidality.

## 2022-06-07 NOTE — PROGRESS NOTE BEHAVIORAL HEALTH - NSBHFUPINTERVALHXFT_PSY_A_CORE
Patient seen and evaluated. As per nursing report patient creating issues on the unit. Patient very flirtatious with female staff. Appears to get upset when attempts made to re-direct him. On approach patient irritable requesting discharge. Upset about trying to talk to female staff and the male staff “always having something to say”. Attempts to have discussion about his inappropriate behavior unsuccessful due to his inability to regulate his emotions. PRN meds offered and accepted with good effect. Patient denies suicidal/homicidal ideations. Request discharge. States he will follow up with his psychiatrist Dr. Wilkes. Has an appointment scheduled for tomorrow afternoon.  Again confirms does not have access to a weapon. Denies A/V hallucinations. Patient does not present as psychotic. Denies any s/s of shawanda. Hospitalizations appear to be precipitated by acute social stressors in the context of chronic predisposing factors including cognitive limitations, limited social engagement, and trauma, and perpetuating factors include substance use. All issues on the unit appear to be behavioral. Patient has chronic issues with impulsivity. He has difficulty regulating his emotions which is chronic and common with individuals on the Autism spectrum. Patient is compliant with medication. Future oriented and connected to outpatient in the community. Does not warrant continued Inpatient hospitalization. Patient does not present an imminent risk to self or others at this time. Anticipated discharge tomorrow 6/8/22. Patient seen and evaluated. As per nursing report patient creating issues on the unit. Patient very flirtatious with female staff. Appears to get upset when attempts made to re-direct him. On approach patient irritable requesting discharge. Upset about trying to talk to female staff and the male staff “always having something to say”. Attempts to have discussion about his inappropriate behavior unsuccessful due to his inability to regulate his emotions. PRN meds offered and accepted with good effect. Patient denies suicidal/homicidal ideations. Request discharge. States he will follow up with his psychiatrist Dr. Wilkes. Has an appointment scheduled for tomorrow afternoon.  Again confirms does not have access to a weapon. Denies A/V hallucinations. Patient does not present as psychotic. Denies any s/s of shawanda. Hospitalizations appear to be precipitated by acute social stressors in the context of chronic predisposing factors including cognitive limitations, limited social engagement, and trauma, and perpetuating factors include substance use. All issues on the unit appear to be behavioral. Patient has chronic issues with impulsivity. He has difficulty regulating his emotions which is chronic and common with individuals on the Autism spectrum. Patient is compliant with medication. Future oriented and connected to outpatient in the community. Does not warrant continued Inpatient hospitalization. Patient does not present an imminent risk to self or others at this time. Anticipated discharge tomorrow 6/8/22.    Even though patient denies having access to any weapons, writer Safe Act patient as a precaution    Submitted On: 6/7/2022 11:12:08 AM  Reference Number: [id number]  By: Radha Dawn  For: Cesario Chances sent to [facility] pharmacy for delivery to [facility] pharmacy Patient seen and evaluated. As per nursing report patient creating issues on the unit. Patient very flirtatious with female staff. Appears to get upset when attempts made to re-direct him. On approach patient irritable requesting discharge. Upset about trying to talk to female staff and the male staff “always having something to say”. Attempts to have discussion about his inappropriate behavior unsuccessful due to his inability to regulate his emotions. Patient makes derogatory statements regarding the female staff and racial slurrs regarding male staff. PRN meds offered and accepted with good effect. Patient denies suicidal/homicidal ideations. Requests discharge. States he will follow up with his psychiatrist Dr. Wilkes. Has an appointment scheduled for tomorrow afternoon.  Again states he is not suicidal and confirms does not have access to a weapon. Denies A/V hallucinations. Patient does not present as psychotic. Denies any s/s of shawanda. Hospitalizations appear to be precipitated by acute social stressors in the context of chronic predisposing factors including cognitive limitations, limited social engagement, and trauma, and perpetuating factors include substance use. All issues on the unit appear to be behavioral. Patient has chronic issues with impulsivity. He has difficulty regulating his emotions which is chronic and common with individuals on the Autism spectrum. Patient is compliant with medication. Future oriented and connected to outpatient in the community. Does not warrant continued Inpatient hospitalization. Patient does not present an imminent risk to self or others at this time. Anticipated discharge tomorrow 6/8/22.    Even though patient denies having access to any weapons, writer Safe Act patient as a precaution    Submitted On: 6/7/2022 11:12:08 AM  Reference Number: [id number]  By: Radha Dawn  For: Cesario Gracia    Meds sent to [facility] pharmacy for delivery to [facility] pharmacy Patient seen and evaluated. As per nursing report patient creating issues on the unit. Patient very flirtatious with female staff. Appears to get upset when attempts made to re-direct him. On approach patient irritable requesting discharge. Upset about trying to talk to female staff and the male staff “always having something to say”. Attempts to have discussion about his inappropriate behavior unsuccessful due to his inability to regulate his emotions. Patient makes derogatory statements regarding the female staff and racial slurs regarding male staff. PRN meds offered and accepted with good effect. Patient denies suicidal/homicidal ideations. Requests discharge. States he will follow up with his psychiatrist Dr. Wilkes. Has an appointment scheduled for tomorrow afternoon.  Again states he is not suicidal and confirms does not have access to a weapon. Denies A/V hallucinations. Patient does not present as psychotic. Denies any s/s of shawanda. Hospitalizations appear to be precipitated by acute social stressors in the context of chronic predisposing factors including cognitive limitations, limited social engagement, and trauma, and perpetuating factors include substance use. All issues on the unit appear to be behavioral. Patient has chronic issues with impulsivity. He has difficulty regulating his emotions which is chronic and common with individuals on the Autism spectrum. Patient is compliant with medication. Future oriented and connected to outpatient in the community. Does not warrant continued Inpatient hospitalization. Patient does not present an imminent risk to self or others at this time. Anticipated discharge tomorrow 6/8/22.    Even though patient denies having access to any weapons, writer Safe Act patient as a precaution    Submitted On: 6/7/2022 11:12:08 AM  Reference Number: [id number]  By: Radha Dawn  For: Cesario Gracia    Meds sent to [facility] pharmacy for delivery to [facility] pharmacy    Left voicemail message for patients Mother, Jeimy Feliciano ( [phone])     Spoke to patients Uncle Dillon [phone]. States he has never met patient and at this time will not agree for patient to come live with him.    Patient agrees to go to a shelter. Patient seen and evaluated. As per nursing report patient creating issues on the unit. Patient very flirtatious with female staff. Appears to get upset when attempts made to re-direct him. On approach patient irritable requesting discharge. Upset about trying to talk to female staff and the male staff “always having something to say”. Attempts to have discussion about his inappropriate behavior unsuccessful due to his inability to regulate his emotions. Patient makes derogatory statements regarding the female staff and racial slurs regarding male staff. PRN meds offered and accepted with good effect. Patient denies suicidal/homicidal ideations. Requests discharge. States he will follow up with his psychiatrist Dr. Wilkes. Has an appointment scheduled for tomorrow afternoon.  Again states he is not suicidal and confirms does not have access to a weapon. Denies A/V hallucinations. Patient does not present as psychotic. Denies any s/s of shawanda. Hospitalizations appear to be precipitated by acute social stressors in the context of chronic predisposing factors including cognitive limitations, limited social engagement, and trauma, and perpetuating factors include substance use. All issues on the unit appear to be behavioral. Patient has chronic issues with impulsivity. Appears to have antisocial personality traits. He has difficulty regulating his emotions which is chronic and common with individuals on the Autism spectrum. Patient is compliant with medication. Future oriented and connected to outpatient in the community. Does not warrant continued Inpatient hospitalization. Patient does not present an imminent risk to self or others at this time. Anticipated discharge tomorrow 6/8/22.    Even though patient denies having access to any weapons, writer Safe Act patient as a precaution    Submitted On: 6/7/2022 11:12:08 AM  Reference Number: [id number]  By: Radha Dawn  For: Cesario Gracia    Meds sent to [facility] pharmacy for delivery to [facility] pharmacy    Left voicemail message for patients Mother, Jeimy Feliciano ( [phone])     Spoke to patients Uncle Dillon [phone]. States he has never met patient and at this time will not agree for patient to come live with him.    Patient agrees to go to a shelter. Patient seen and evaluated. As per nursing report patient creating issues on the unit. Patient very flirtatious with female staff. Appears to get upset when attempts made to re-direct him. On approach patient irritable requesting discharge. Upset about trying to talk to female staff and the male staff “always having something to say”. Attempts to have discussion about his inappropriate behavior unsuccessful due to his inability to regulate his emotions. Patient makes derogatory statements regarding the female staff and racial slurs regarding male staff. PRN meds offered and accepted with good effect. Patient denies suicidal/homicidal ideations. Requests discharge. States he will follow up with his psychiatrist Dr. Wilkes. Has an appointment scheduled for tomorrow afternoon.  Again states he is not suicidal and confirms does not have access to a weapon. Denies A/V hallucinations. Patient does not present as psychotic. Denies any s/s of shawanda. Hospitalizations appear to be precipitated by acute social stressors in the context of chronic predisposing factors including cognitive limitations, limited social engagement, and trauma, and perpetuating factors include substance use. All issues on the unit appear to be behavioral. Patient has chronic issues with impulsivity. Appears to have antisocial personality traits. He has difficulty regulating his emotions which is chronic and common with individuals who may be on the Autism spectrum. Patient is compliant with medication. Future oriented and connected to outpatient in the community. Does not warrant continued Inpatient hospitalization. Patient does not present an imminent risk to self or others at this time. Anticipated discharge tomorrow 6/8/22.    Even though patient denies having access to any weapons, writer Safe Act patient as a precaution    Submitted On: 6/7/2022 11:12:08 AM  Reference Number: [id number]  By: Radha Dawn  For: Cesario Gracia    Meds sent to [facility] pharmacy for delivery to [facility] pharmacy    Left voicemail message for patients Mother, Jeimy Feliciano ( [phone])     Spoke to patients Uncle Dillon [phone]. States he has never met patient and at this time will not agree for patient to come live with him.    Patient agrees to go to a shelter.

## 2022-06-07 NOTE — PROGRESS NOTE BEHAVIORAL HEALTH - NSBHCHARTREVIEWLAB_PSY_A_CORE FT
Complete Blood Count + Automated Diff (06.02.22 @ 02:19)   WBC Count: 5.75 K/uL   RBC Count: 5.31 M/uL   Hemoglobin: 14.7 g/dL   Hematocrit: 45.2   Mean Cell Volume: 85.1 fL   Mean Cell Hemoglobin: 27.7 pg   Mean Cell Hemoglobin Conc: 32.5 g/dL   Red Cell Distrib Width: 13.2    Platelet Count - Automated: 284 K/uL   Auto Neutrophil #: 3.10 K/uL   Auto Lymphocyte #: 1.83 K/uL   Auto Monocyte #: 0.47 K/uL   Auto Eosinophil #: 0.29 K/uL   Auto Basophil #: 0.04 K/uL   Auto Neutrophil %: 54.0:   Auto Lymphocyte %: 31.8    Auto Monocyte %: 8.2   Auto Eosinophil %: 5.0    Auto Basophil %: 0.7    Auto Immature Granulocyte %: 0.3: (Includes meta, myelo and promyelocytes)    Nucleated RBC: 0 /100 WBCs
Complete Blood Count + Automated Diff (06.02.22 @ 02:19)   WBC Count: 5.75 K/uL   RBC Count: 5.31 M/uL   Hemoglobin: 14.7 g/dL   Hematocrit: 45.2   Mean Cell Volume: 85.1 fL   Mean Cell Hemoglobin: 27.7 pg   Mean Cell Hemoglobin Conc: 32.5 g/dL   Red Cell Distrib Width: 13.2    Platelet Count - Automated: 284 K/uL   Auto Neutrophil #: 3.10 K/uL   Auto Lymphocyte #: 1.83 K/uL   Auto Monocyte #: 0.47 K/uL   Auto Eosinophil #: 0.29 K/uL   Auto Basophil #: 0.04 K/uL   Auto Neutrophil %: 54.0:   Auto Lymphocyte %: 31.8    Auto Monocyte %: 8.2   Auto Eosinophil %: 5.0    Auto Basophil %: 0.7    Auto Immature Granulocyte %: 0.3: (Includes meta, myelo and promyelocytes)    Nucleated RBC: 0 /100 WBCs
Complete Blood Count + Automated Diff (06.02.22 @ 02:19)    WBC Count: 5.75 K/uL    RBC Count: 5.31 M/uL    Hemoglobin: 14.7 g/dL    Hematocrit: 45.2 %    Mean Cell Volume: 85.1 fL    Mean Cell Hemoglobin: 27.7 pg    Mean Cell Hemoglobin Conc: 32.5 g/dL    Red Cell Distrib Width: 13.2 %    Platelet Count - Automated: 284 K/uL    Auto Neutrophil #: 3.10 K/uL    Auto Lymphocyte #: 1.83 K/uL    Auto Monocyte #: 0.47 K/uL    Auto Eosinophil #: 0.29 K/uL    Auto Basophil #: 0.04 K/uL    Auto Neutrophil %: 54.0: Differential percentages must be correlated with absolute numbers for  clinical significance. %    Auto Lymphocyte %: 31.8 %    Auto Monocyte %: 8.2 %    Auto Eosinophil %: 5.0 %    Auto Basophil %: 0.7 %    Auto Immature Granulocyte %: 0.3: (Includes meta, myelo and promyelocytes) %    Nucleated RBC: 0 /100 WBCs    Comprehensive Metabolic Panel (06.02.22 @ 02:19)    Sodium, Serum: 144 mmol/L    Potassium, Serum: 4.3 mmol/L    Chloride, Serum: 107 mmol/L    Carbon Dioxide, Serum: 19 mmol/L    Anion Gap, Serum: 18 mmol/L    Blood Urea Nitrogen, Serum: 20 mg/dL    Creatinine, Serum: 0.9 mg/dL    Glucose, Serum: 135 mg/dL    Calcium, Total Serum: 9.5 mg/dL    Protein Total, Serum: 6.9 g/dL    Albumin, Serum: 4.7 g/dL    Bilirubin Total, Serum: <0.2 mg/dL    Alkaline Phosphatase, Serum: 72 U/L    Aspartate Aminotransferase (AST/SGOT): 23 U/L    Alanine Aminotransferase (ALT/SGPT): 17 U/L    eGFR: 121: The estimated glomerular filtration rate (eGFR) is calculated using the  2021 CKD-EPI creatinine equation, which does not have a coefficient for  race and is validated in individuals 18 years of age and older (N Engl J  Med 2021; 385:2724-1704). Creatinine-based eGFR may be inaccurate in  various situations including but not limited to extremes of muscle mass,  altered dietary protein intake, or medications that affect renal tubular  creatinine secretion. mL/min/1.73m2    UDS - negative
Complete Blood Count + Automated Diff (06.02.22 @ 02:19)    WBC Count: 5.75 K/uL    RBC Count: 5.31 M/uL    Hemoglobin: 14.7 g/dL    Hematocrit: 45.2 %    Mean Cell Volume: 85.1 fL    Mean Cell Hemoglobin: 27.7 pg    Mean Cell Hemoglobin Conc: 32.5 g/dL    Red Cell Distrib Width: 13.2 %    Platelet Count - Automated: 284 K/uL    Auto Neutrophil #: 3.10 K/uL    Auto Lymphocyte #: 1.83 K/uL    Auto Monocyte #: 0.47 K/uL    Auto Eosinophil #: 0.29 K/uL    Auto Basophil #: 0.04 K/uL    Auto Neutrophil %: 54.0: Differential percentages must be correlated with absolute numbers for  clinical significance. %    Auto Lymphocyte %: 31.8 %    Auto Monocyte %: 8.2 %    Auto Eosinophil %: 5.0 %    Auto Basophil %: 0.7 %    Auto Immature Granulocyte %: 0.3: (Includes meta, myelo and promyelocytes) %    Nucleated RBC: 0 /100 WBCs    Comprehensive Metabolic Panel (06.02.22 @ 02:19)    Sodium, Serum: 144 mmol/L    Potassium, Serum: 4.3 mmol/L    Chloride, Serum: 107 mmol/L    Carbon Dioxide, Serum: 19 mmol/L    Anion Gap, Serum: 18 mmol/L    Blood Urea Nitrogen, Serum: 20 mg/dL    Creatinine, Serum: 0.9 mg/dL    Glucose, Serum: 135 mg/dL    Calcium, Total Serum: 9.5 mg/dL    Protein Total, Serum: 6.9 g/dL    Albumin, Serum: 4.7 g/dL    Bilirubin Total, Serum: <0.2 mg/dL    Alkaline Phosphatase, Serum: 72 U/L    Aspartate Aminotransferase (AST/SGOT): 23 U/L    Alanine Aminotransferase (ALT/SGPT): 17 U/L    eGFR: 121: The estimated glomerular filtration rate (eGFR) is calculated using the  2021 CKD-EPI creatinine equation, which does not have a coefficient for  race and is validated in individuals 18 years of age and older (N Engl J  Med 2021; 385:1204-8490). Creatinine-based eGFR may be inaccurate in  various situations including but not limited to extremes of muscle mass,  altered dietary protein intake, or medications that affect renal tubular  creatinine secretion. mL/min/1.73m2    UDS - negative

## 2022-06-07 NOTE — PROGRESS NOTE BEHAVIORAL HEALTH - NSBHCHARTREVIEWVS_PSY_A_CORE FT
Vital Signs Last 24 Hrs  T(C): 36.7 (07 Jun 2022 08:10), Max: 36.7 (07 Jun 2022 08:10)  T(F): 98 (07 Jun 2022 08:10), Max: 98 (07 Jun 2022 08:10)  HR: 54 (07 Jun 2022 08:10) (54 - 78)  BP: 123/76 (07 Jun 2022 08:10) (117/67 - 134/64)  BP(mean): --  RR: 18 (07 Jun 2022 08:10) (16 - 18)  SpO2: --
Vital Signs Last 24 Hrs  T(C): 35.6 (04 Jun 2022 09:49), Max: 37 (03 Jun 2022 15:53)  T(F): 96 (04 Jun 2022 09:49), Max: 98.6 (03 Jun 2022 15:53)  HR: 62 (04 Jun 2022 09:49) (53 - 66)  BP: 110/66 (04 Jun 2022 09:49) (100/64 - 118/58)  BP(mean): --  RR: 18 (04 Jun 2022 09:49) (18 - 18)  SpO2: 97% (03 Jun 2022 15:53) (97% - 97%)
Vital Signs Last 24 Hrs  T(C): 35.2 (06 Jun 2022 08:04), Max: 35.4 (05 Jun 2022 16:51)  T(F): 95.4 (06 Jun 2022 08:04), Max: 95.8 (05 Jun 2022 16:51)  HR: 74 (06 Jun 2022 08:04) (74 - 88)  BP: 119/61 (06 Jun 2022 08:04) (119/61 - 137/79)  BP(mean): --  RR: 18 (06 Jun 2022 08:04) (16 - 18)  SpO2: --
ICU Vital Signs Last 24 Hrs  T(C): 35.5 (05 Jun 2022 08:58), Max: 35.5 (05 Jun 2022 08:58)  T(F): 95.9 (05 Jun 2022 08:58), Max: 95.9 (05 Jun 2022 08:58)  HR: 59 (05 Jun 2022 08:58) (59 - 59)  BP: 113/73 (05 Jun 2022 08:58) (113/73 - 113/73)  BP(mean): --  ABP: --  ABP(mean): --  RR: 18 (05 Jun 2022 08:58) (18 - 18)  SpO2: --

## 2022-06-07 NOTE — PROGRESS NOTE BEHAVIORAL HEALTH - NSBHCHARTREVIEWINVESTIGATE_PSY_A_CORE FT
< from: 12 Lead ECG (06.02.22 @ 02:22) >      Ventricular Rate 60 BPM    Atrial Rate 60 BPM    P-R Interval 138 ms    QRS Duration 82 ms    Q-T Interval 386 ms    QTC Calculation(Bazett) 386 ms    P Axis 51 degrees    R Axis 40 degrees    T Axis 32 degrees    Diagnosis Line Normal sinus rhythm  Normal ECG      < end of copied text >
< from: 12 Lead ECG (06.02.22 @ 02:22) >      Ventricular Rate 60 BPM    Atrial Rate 60 BPM    P-R Interval 138 ms    QRS Duration 82 ms    Q-T Interval 386 ms    QTC Calculation(Bazett) 386 ms    P Axis 51 degrees    R Axis 40 degrees    T Axis 32 degrees    Diagnosis Line Normal sinus rhythm  Normal ECG    Confirmed by Jabari Villalta ([id number]) on 6/2/2022 11:14:13 AM    < end of copied text >
< from: 12 Lead ECG (06.02.22 @ 02:22) >      Ventricular Rate 60 BPM    Atrial Rate 60 BPM    P-R Interval 138 ms    QRS Duration 82 ms    Q-T Interval 386 ms    QTC Calculation(Bazett) 386 ms    P Axis 51 degrees    R Axis 40 degrees    T Axis 32 degrees    Diagnosis Line Normal sinus rhythm  Normal ECG    Confirmed by Jabari Villalta ([id number]) on 6/2/2022 11:14:13 AM    < end of copied text >
< from: 12 Lead ECG (06.02.22 @ 02:22) >      Ventricular Rate 60 BPM    Atrial Rate 60 BPM    P-R Interval 138 ms    QRS Duration 82 ms    Q-T Interval 386 ms    QTC Calculation(Bazett) 386 ms    P Axis 51 degrees    R Axis 40 degrees    T Axis 32 degrees    Diagnosis Line Normal sinus rhythm  Normal ECG      < end of copied text >

## 2022-06-07 NOTE — PROGRESS NOTE BEHAVIORAL HEALTH - SUMMARY
26-year-old  male, originally from [place], [place], employed, living w/ uncle or friend vs roommate in [place], , no dependents, with reported psychiatric history of suspected autism spectrum disorder, PTSD, bipolar disorder and substance use disorder (alcohol, cocaine, cannabis, ecstasy), multiple prior inpatient psychiatry admissions, in the setting of substance intoxication and overdose, most recent hospitalization was 4/11/22-4/13/22 at [facility]; currently in outpt tx (reports next appointment in June), unclear past suicide attempts vs accidental overdose, presenting to the ED brought in by self for suicidal ideation and homicidal ideations.     Patient seen and evaluated. As per nursing report patient creating issues on the unit. Patient very flirtatious with female staff. Appears to get upset when attempts made to re-direct him. On approach patient irritable requesting discharge. Upset about trying to talk to female staff and the male staff “always having something to say”. Attempts to have discussion about his inappropriate behavior unsuccessful due to his inability to regulate his emotions. PRN meds offered and accepted with good effect. Patient denies suicidal/homicidal ideations. Request discharge. States he will follow up with his psychiatrist Dr. Wilkes. Has an appointment scheduled for tomorrow afternoon.  Again confirms does not have access to a weapon. Denies A/V hallucinations. Patient does not present as psychotic. Denies any s/s of shawanda. Hospitalizations appear to be precipitated by acute social stressors in the context of chronic predisposing factors including cognitive limitations, limited social engagement, and trauma, and perpetuating factors include substance use. All issues on the unit appear to be behavioral. Patient has chronic issues with impulsivity. He has difficulty regulating his emotions which is chronic and common with individuals on the Autism spectrum. Patient is compliant with medication. Future oriented and connected to outpatient in the community. Does not warrant continued Inpatient hospitalization. Patient does not present an imminent risk to self or others at this time. Anticipated discharge tomorrow 6/8/22.    #Admit    #Mood Disorder  -Depakote 500mg P.O BID     -Thorazine 50mg Q6 PRN for agitation/aggression  -Lorazepam 2mg Q6 PRN for aggression/agitation  -Hydroxyzine 50mg Q6 PRN for anxiety/insomnia    - For agitation, patient will benefit from Thorazine 50mg p.o Q 6 hours PRN in combination with Ativan 2mg P.O Q 6 hours PRN for agitation if patient does not respond to redirection first. Escalation to Thorazine 50mg IM if pt is a danger to self or/and others with repeat EKG to ensure QTc <500 ms. 26-year-old  male, originally from [place], [place], employed, living w/ uncle or friend vs roommate in [place], , no dependents, with reported psychiatric history of suspected autism spectrum disorder, PTSD, bipolar disorder and substance use disorder (alcohol, cocaine, cannabis, ecstasy), multiple prior inpatient psychiatry admissions, in the setting of substance intoxication and overdose, most recent hospitalization was 4/11/22-4/13/22 at [facility]; currently in outpt tx (reports next appointment in June), unclear past suicide attempts vs accidental overdose, presenting to the ED brought in by self for suicidal ideation and homicidal ideations.     Patient seen and evaluated. As per nursing report patient creating issues on the unit. Patient very flirtatious with female staff. Appears to get upset when attempts made to re-direct him. On approach patient irritable requesting discharge. Upset about trying to talk to female staff and the male staff “always having something to say”. Attempts to have discussion about his inappropriate behavior unsuccessful due to his inability to regulate his emotions. Patient makes derogatory statements regarding the female staff and racial slurrs regarding male staff. PRN meds offered and accepted with good effect. Patient denies suicidal/homicidal ideations. Requests discharge. States he will follow up with his psychiatrist Dr. Wilkes. Has an appointment scheduled for tomorrow afternoon.  Again states he is not suicidal and confirms does not have access to a weapon. Denies A/V hallucinations. Patient does not present as psychotic. Denies any s/s of shawanda. Hospitalizations appear to be precipitated by acute social stressors in the context of chronic predisposing factors including cognitive limitations, limited social engagement, and trauma, and perpetuating factors include substance use. All issues on the unit appear to be behavioral. Patient has chronic issues with impulsivity. He has difficulty regulating his emotions which is chronic and common with individuals on the Autism spectrum. Patient is compliant with medication. Future oriented and connected to outpatient in the community. Does not warrant continued Inpatient hospitalization. Patient does not present an imminent risk to self or others at this time. Anticipated discharge tomorrow 6/8/22.    #Admit    #Mood Disorder  -Depakote 500mg P.O BID     -Thorazine 50mg Q6 PRN for agitation/aggression  -Lorazepam 2mg Q6 PRN for aggression/agitation  -Hydroxyzine 50mg Q6 PRN for anxiety/insomnia    - For agitation, patient will benefit from Thorazine 50mg p.o Q 6 hours PRN in combination with Ativan 2mg P.O Q 6 hours PRN for agitation if patient does not respond to redirection first. Escalation to Thorazine 50mg IM if pt is a danger to self or/and others with repeat EKG to ensure QTc <500 ms. 26-year-old  male, originally from [place], [place], employed, living w/ uncle or friend vs roommate in [place], , no dependents, with reported psychiatric history of suspected autism spectrum disorder, PTSD, bipolar disorder and substance use disorder (alcohol, cocaine, cannabis, ecstasy), multiple prior inpatient psychiatry admissions, in the setting of substance intoxication and overdose, most recent hospitalization was 4/11/22-4/13/22 at [facility]; currently in outpt tx (reports next appointment in June), unclear past suicide attempts vs accidental overdose, presenting to the ED brought in by self for suicidal ideation and homicidal ideations.     Patient seen and evaluated. As per nursing report patient creating issues on the unit. Patient very flirtatious with female staff. Appears to get upset when attempts made to re-direct him. On approach patient irritable requesting discharge. Upset about trying to talk to female staff and the male staff “always having something to say”. Attempts to have discussion about his inappropriate behavior unsuccessful due to his inability to regulate his emotions. Patient makes derogatory statements regarding the female staff and racial slurs regarding male staff. PRN meds offered and accepted with good effect. Patient denies suicidal/homicidal ideations. Requests discharge. States he will follow up with his psychiatrist Dr. Wilkes. Has an appointment scheduled for tomorrow afternoon.  Again states he is not suicidal and confirms does not have access to a weapon. Denies A/V hallucinations. Patient does not present as psychotic. Denies any s/s of shawanda. Hospitalizations appear to be precipitated by acute social stressors in the context of chronic predisposing factors including cognitive limitations, limited social engagement, and trauma, and perpetuating factors include substance use. All issues on the unit appear to be behavioral. Patient has chronic issues with impulsivity. He has difficulty regulating his emotions which is chronic and common with individuals on the Autism spectrum. Patient is compliant with medication. Future oriented and connected to outpatient in the community. Does not warrant continued Inpatient hospitalization. Patient does not present an imminent risk to self or others at this time. Anticipated discharge tomorrow 6/8/22.    #Admit    #Mood Disorder  -Depakote 500mg P.O BID     -Thorazine 50mg Q6 PRN for agitation/aggression  -Lorazepam 2mg Q6 PRN for aggression/agitation  -Hydroxyzine 50mg Q6 PRN for anxiety/insomnia    - For agitation, patient will benefit from Thorazine 50mg p.o Q 6 hours PRN in combination with Ativan 2mg P.O Q 6 hours PRN for agitation if patient does not respond to redirection first. Escalation to Thorazine 50mg IM if pt is a danger to self or/and others with repeat EKG to ensure QTc <500 ms. 26-year-old  male, originally from [place], [place], employed, living w/ uncle or friend vs roommate in [place], , no dependents, with reported psychiatric history of suspected autism spectrum disorder, PTSD, bipolar disorder and substance use disorder (alcohol, cocaine, cannabis, ecstasy), multiple prior inpatient psychiatry admissions, in the setting of substance intoxication and overdose, most recent hospitalization was 4/11/22-4/13/22 at [facility]; currently in outpt tx (reports next appointment in June), unclear past suicide attempts vs accidental overdose, presenting to the ED brought in by self for suicidal ideation and homicidal ideations.     Patient seen and evaluated. As per nursing report patient creating issues on the unit. Patient very flirtatious with female staff. Appears to get upset when attempts made to re-direct him. On approach patient irritable requesting discharge. Upset about trying to talk to female staff and the male staff “always having something to say”. Attempts to have discussion about his inappropriate behavior unsuccessful due to his inability to regulate his emotions. Patient makes derogatory statements regarding the female staff and racial slurs regarding male staff. PRN meds offered and accepted with good effect. Patient denies suicidal/homicidal ideations. Requests discharge. States he will follow up with his psychiatrist Dr. Wilkes. Has an appointment scheduled for tomorrow afternoon.  Again states he is not suicidal and confirms does not have access to a weapon. Denies A/V hallucinations. Patient does not present as psychotic. Denies any s/s of shawanda. Hospitalizations appear to be precipitated by acute social stressors in the context of chronic predisposing factors including cognitive limitations, limited social engagement, and trauma, and perpetuating factors include substance use. All issues on the unit appear to be behavioral. Patient has chronic issues with impulsivity. Appears to have antisocial personality traits. He has difficulty regulating his emotions which is chronic and common with individuals on the Autism spectrum. Patient is compliant with medication. Future oriented and connected to outpatient in the community. Does not warrant continued Inpatient hospitalization. Patient does not present an imminent risk to self or others at this time. Anticipated discharge tomorrow 6/8/22.    #Admit    #Mood Disorder  -Depakote 500mg P.O BID     -Thorazine 50mg Q6 PRN for agitation/aggression  -Lorazepam 2mg Q6 PRN for aggression/agitation  -Hydroxyzine 50mg Q6 PRN for anxiety/insomnia    - For agitation, patient will benefit from Thorazine 50mg p.o Q 6 hours PRN in combination with Ativan 2mg P.O Q 6 hours PRN for agitation if patient does not respond to redirection first. Escalation to Thorazine 50mg IM if pt is a danger to self or/and others with repeat EKG to ensure QTc <500 ms. 26-year-old  male, originally from [place], [place], employed, living w/ uncle or friend vs roommate in [place], , no dependents, with reported psychiatric history of suspected autism spectrum disorder, PTSD, bipolar disorder and substance use disorder (alcohol, cocaine, cannabis, ecstasy), multiple prior inpatient psychiatry admissions, in the setting of substance intoxication and overdose, most recent hospitalization was 4/11/22-4/13/22 at [facility]; currently in outpt tx (reports next appointment in June), unclear past suicide attempts vs accidental overdose, presenting to the ED brought in by self for suicidal ideation and homicidal ideations.     Patient seen and evaluated. As per nursing report patient creating issues on the unit. Patient very flirtatious with female staff. Appears to get upset when attempts made to re-direct him. On approach patient irritable requesting discharge. Upset about trying to talk to female staff and the male staff “always having something to say”. Attempts to have discussion about his inappropriate behavior unsuccessful due to his inability to regulate his emotions. Patient makes derogatory statements regarding the female staff and racial slurs regarding male staff. PRN meds offered and accepted with good effect. Patient denies suicidal/homicidal ideations. Requests discharge. States he will follow up with his psychiatrist Dr. Wilkes. Has an appointment scheduled for tomorrow afternoon.  Again states he is not suicidal and confirms does not have access to a weapon. Denies A/V hallucinations. Patient does not present as psychotic. Denies any s/s of shawanda. Hospitalizations appear to be precipitated by acute social stressors in the context of chronic predisposing factors including cognitive limitations, limited social engagement, and trauma, and perpetuating factors include substance use. All issues on the unit appear to be behavioral. Patient has chronic issues with impulsivity. Appears to have antisocial personality traits. He has difficulty regulating his emotions which is chronic and common with individuals who may be on the Autism spectrum. Patient is compliant with medication. Future oriented and connected to outpatient in the community. Does not warrant continued Inpatient hospitalization. Patient does not present an imminent risk to self or others at this time. Anticipated discharge tomorrow 6/8/22.    #Admit    #Mood Disorder  -Depakote 500mg P.O BID     -Thorazine 50mg Q6 PRN for agitation/aggression  -Lorazepam 2mg Q6 PRN for aggression/agitation  -Hydroxyzine 50mg Q6 PRN for anxiety/insomnia    - For agitation, patient will benefit from Thorazine 50mg p.o Q 6 hours PRN in combination with Ativan 2mg P.O Q 6 hours PRN for agitation if patient does not respond to redirection first. Escalation to Thorazine 50mg IM if pt is a danger to self or/and others with repeat EKG to ensure QTc <500 ms.

## 2022-06-07 NOTE — PROGRESS NOTE BEHAVIORAL HEALTH - SECONDARY DX1
Cannabis use disorder, moderate, dependence

## 2022-06-07 NOTE — PROGRESS NOTE BEHAVIORAL HEALTH - NSBHADMITIPREASON_PSY_A_CORE
Danger to others; mental illness expected to respond to inpatient care
Danger to self; mental illness expected to respond to inpatient care
Danger to self; mental illness expected to respond to inpatient care
Danger to others; mental illness expected to respond to inpatient care

## 2022-06-08 VITALS
DIASTOLIC BLOOD PRESSURE: 73 MMHG | SYSTOLIC BLOOD PRESSURE: 123 MMHG | RESPIRATION RATE: 18 BRPM | TEMPERATURE: 96 F | HEART RATE: 121 BPM

## 2022-06-08 DIAGNOSIS — F39 UNSPECIFIED MOOD [AFFECTIVE] DISORDER: ICD-10-CM

## 2022-06-08 LAB
A1C WITH ESTIMATED AVERAGE GLUCOSE RESULT: 5.3 % — SIGNIFICANT CHANGE UP (ref 4–5.6)
CHOLEST SERPL-MCNC: 192 MG/DL — SIGNIFICANT CHANGE UP
ESTIMATED AVERAGE GLUCOSE: 105 MG/DL — SIGNIFICANT CHANGE UP (ref 68–114)
HDLC SERPL-MCNC: 39 MG/DL — LOW
LIPID PNL WITH DIRECT LDL SERPL: 117 MG/DL — HIGH
NON HDL CHOLESTEROL: 153 MG/DL — HIGH
TRIGL SERPL-MCNC: 178 MG/DL — HIGH
TSH SERPL-MCNC: 3.05 UIU/ML — SIGNIFICANT CHANGE UP (ref 0.27–4.2)
VALPROATE SERPL-MCNC: 79 UG/ML — SIGNIFICANT CHANGE UP (ref 50–100)

## 2022-06-08 PROCEDURE — 99238 HOSP IP/OBS DSCHRG MGMT 30/<: CPT

## 2022-06-08 RX ADMIN — Medication 2 MILLIGRAM(S): at 08:25

## 2022-06-08 RX ADMIN — DIVALPROEX SODIUM 500 MILLIGRAM(S): 500 TABLET, DELAYED RELEASE ORAL at 08:22

## 2022-06-08 RX ADMIN — Medication 50 MILLIGRAM(S): at 08:26

## 2022-06-08 NOTE — CHART NOTE - NSCHARTNOTEFT_GEN_A_CORE
Social Work Discharge Note:    Patient is for discharge today. He is alert and oriented x3.  Mood has improved.  Anxiety has decreased.  Insight and judgment have improved.  Suicidal/homicidal ideation denied.    Patient will be discharged to his [facility] - [address] [place] [address].  He will resume treatment with his psychiatrist Dr. Wilkes at [facility].    Patient confirms his cell phone # is [phone].    Patient is aware and agreeable to discharge today.

## 2022-06-08 NOTE — CHART NOTE - NSCHARTNOTEFT_GEN_A_CORE
D/C today. Transportation set up. [employer] cards also provided. Patient will be taken to his Outpatient appointment. Meds delivered from [facility] and given to patient. Patient Denies suicidal/ homicidal ideations. Patient able to contract for safety. Patient is future oriented. Denies A/V hallucinations. No evidence of psychosis noted. Compliant with medication. Hospitalizations appear to be precipitated by acute social stressors in the context of chronic predisposing factors including cognitive limitations, limited social engagement, and trauma, and perpetuating factors include substance use. All issues on the unit appear to be behavioral. Patient has chronic issues with impulsivity. Appears to have antisocial personality traits. He has difficulty regulating his emotions. Does not warrant continued Inpatient hospitalization. Writer reviewed D/C papers with patient. Patient verbalized understanding. Patient does not appear to be of imminent danger to self or others at this time.      Spoke to patient’s mother Jeimy Feliciano [phone]. Updated her on patients care and progress and anticipated discharge. Made her aware patient to be discharged to shelter. Mom has no concerns with discharge. States she lives in [place] and patient cant stay with her. [facility] patient has “behavioral issues” which stems from him being “borderline autistic”. [facility] patient has a hx of non compliance with medication and treatment. Also [facility] patient does not want to listen to anyone including her.

## 2022-06-14 DIAGNOSIS — F31.9 BIPOLAR DISORDER, UNSPECIFIED: ICD-10-CM

## 2022-06-14 DIAGNOSIS — R45.851 SUICIDAL IDEATIONS: ICD-10-CM

## 2022-06-14 DIAGNOSIS — F19.14 OTHER PSYCHOACTIVE SUBSTANCE ABUSE WITH PSYCHOACTIVE SUBSTANCE-INDUCED MOOD DISORDER: ICD-10-CM

## 2022-06-14 DIAGNOSIS — F14.10 COCAINE ABUSE, UNCOMPLICATED: ICD-10-CM

## 2022-06-14 DIAGNOSIS — F43.10 POST-TRAUMATIC STRESS DISORDER, UNSPECIFIED: ICD-10-CM

## 2022-06-14 DIAGNOSIS — F10.10 ALCOHOL ABUSE, UNCOMPLICATED: ICD-10-CM

## 2022-06-14 DIAGNOSIS — F12.288 CANNABIS DEPENDENCE WITH OTHER CANNABIS-INDUCED DISORDER: ICD-10-CM

## 2022-06-14 DIAGNOSIS — F84.0 AUTISTIC DISORDER: ICD-10-CM

## 2022-06-14 DIAGNOSIS — R45.850 HOMICIDAL IDEATIONS: ICD-10-CM

## 2022-06-14 DIAGNOSIS — Z81.8 FAMILY HISTORY OF OTHER MENTAL AND BEHAVIORAL DISORDERS: ICD-10-CM

## 2022-06-14 DIAGNOSIS — F63.81 INTERMITTENT EXPLOSIVE DISORDER: ICD-10-CM

## 2022-06-14 DIAGNOSIS — Z88.1 ALLERGY STATUS TO OTHER ANTIBIOTIC AGENTS STATUS: ICD-10-CM

## 2022-06-23 ENCOUNTER — EMERGENCY (EMERGENCY)
Facility: HOSPITAL | Age: 27
LOS: 0 days | Discharge: HOME | End: 2022-06-23
Attending: EMERGENCY MEDICINE | Admitting: EMERGENCY MEDICINE
Payer: MEDICAID

## 2022-06-23 VITALS
HEART RATE: 67 BPM | TEMPERATURE: 98 F | HEIGHT: 64 IN | DIASTOLIC BLOOD PRESSURE: 61 MMHG | OXYGEN SATURATION: 98 % | WEIGHT: 149.91 LBS | RESPIRATION RATE: 17 BRPM | SYSTOLIC BLOOD PRESSURE: 136 MMHG

## 2022-06-23 VITALS
TEMPERATURE: 97 F | OXYGEN SATURATION: 99 % | RESPIRATION RATE: 16 BRPM | HEART RATE: 61 BPM | SYSTOLIC BLOOD PRESSURE: 105 MMHG | DIASTOLIC BLOOD PRESSURE: 55 MMHG

## 2022-06-23 DIAGNOSIS — F31.9 BIPOLAR DISORDER, UNSPECIFIED: ICD-10-CM

## 2022-06-23 DIAGNOSIS — F19.90 OTHER PSYCHOACTIVE SUBSTANCE USE, UNSPECIFIED, UNCOMPLICATED: ICD-10-CM

## 2022-06-23 DIAGNOSIS — F20.9 SCHIZOPHRENIA, UNSPECIFIED: ICD-10-CM

## 2022-06-23 DIAGNOSIS — F43.10 POST-TRAUMATIC STRESS DISORDER, UNSPECIFIED: ICD-10-CM

## 2022-06-23 DIAGNOSIS — F17.200 NICOTINE DEPENDENCE, UNSPECIFIED, UNCOMPLICATED: ICD-10-CM

## 2022-06-23 DIAGNOSIS — F84.0 AUTISTIC DISORDER: ICD-10-CM

## 2022-06-23 DIAGNOSIS — R45.851 SUICIDAL IDEATIONS: ICD-10-CM

## 2022-06-23 DIAGNOSIS — Z88.2 ALLERGY STATUS TO SULFONAMIDES: ICD-10-CM

## 2022-06-23 LAB
ANION GAP SERPL CALC-SCNC: 9 MMOL/L — SIGNIFICANT CHANGE UP (ref 7–14)
APAP SERPL-MCNC: <5 UG/ML — LOW (ref 10–30)
BASOPHILS # BLD AUTO: 0.03 K/UL — SIGNIFICANT CHANGE UP (ref 0–0.2)
BASOPHILS NFR BLD AUTO: 0.4 % — SIGNIFICANT CHANGE UP (ref 0–1)
BUN SERPL-MCNC: 20 MG/DL — SIGNIFICANT CHANGE UP (ref 10–20)
CALCIUM SERPL-MCNC: 9.2 MG/DL — SIGNIFICANT CHANGE UP (ref 8.5–10.1)
CHLORIDE SERPL-SCNC: 106 MMOL/L — SIGNIFICANT CHANGE UP (ref 98–110)
CO2 SERPL-SCNC: 24 MMOL/L — SIGNIFICANT CHANGE UP (ref 17–32)
CREAT SERPL-MCNC: 1 MG/DL — SIGNIFICANT CHANGE UP (ref 0.7–1.5)
EGFR: 106 ML/MIN/1.73M2 — SIGNIFICANT CHANGE UP
EOSINOPHIL # BLD AUTO: 0.26 K/UL — SIGNIFICANT CHANGE UP (ref 0–0.7)
EOSINOPHIL NFR BLD AUTO: 3.2 % — SIGNIFICANT CHANGE UP (ref 0–8)
ETHANOL SERPL-MCNC: <10 MG/DL — SIGNIFICANT CHANGE UP
GLUCOSE SERPL-MCNC: 110 MG/DL — HIGH (ref 70–99)
HCT VFR BLD CALC: 40.5 % — LOW (ref 42–52)
HGB BLD-MCNC: 13.4 G/DL — LOW (ref 14–18)
IMM GRANULOCYTES NFR BLD AUTO: 0.7 % — HIGH (ref 0.1–0.3)
LYMPHOCYTES # BLD AUTO: 1.93 K/UL — SIGNIFICANT CHANGE UP (ref 1.2–3.4)
LYMPHOCYTES # BLD AUTO: 24 % — SIGNIFICANT CHANGE UP (ref 20.5–51.1)
MCHC RBC-ENTMCNC: 27.9 PG — SIGNIFICANT CHANGE UP (ref 27–31)
MCHC RBC-ENTMCNC: 33.1 G/DL — SIGNIFICANT CHANGE UP (ref 32–37)
MCV RBC AUTO: 84.4 FL — SIGNIFICANT CHANGE UP (ref 80–94)
MONOCYTES # BLD AUTO: 0.63 K/UL — HIGH (ref 0.1–0.6)
MONOCYTES NFR BLD AUTO: 7.8 % — SIGNIFICANT CHANGE UP (ref 1.7–9.3)
NEUTROPHILS # BLD AUTO: 5.12 K/UL — SIGNIFICANT CHANGE UP (ref 1.4–6.5)
NEUTROPHILS NFR BLD AUTO: 63.9 % — SIGNIFICANT CHANGE UP (ref 42.2–75.2)
NRBC # BLD: 0 /100 WBCS — SIGNIFICANT CHANGE UP (ref 0–0)
PLATELET # BLD AUTO: 281 K/UL — SIGNIFICANT CHANGE UP (ref 130–400)
POTASSIUM SERPL-MCNC: 4 MMOL/L — SIGNIFICANT CHANGE UP (ref 3.5–5)
POTASSIUM SERPL-SCNC: 4 MMOL/L — SIGNIFICANT CHANGE UP (ref 3.5–5)
RBC # BLD: 4.8 M/UL — SIGNIFICANT CHANGE UP (ref 4.7–6.1)
RBC # FLD: 13.2 % — SIGNIFICANT CHANGE UP (ref 11.5–14.5)
SALICYLATES SERPL-MCNC: <0.3 MG/DL — LOW (ref 4–30)
SARS-COV-2 RNA SPEC QL NAA+PROBE: SIGNIFICANT CHANGE UP
SODIUM SERPL-SCNC: 139 MMOL/L — SIGNIFICANT CHANGE UP (ref 135–146)
WBC # BLD: 8.03 K/UL — SIGNIFICANT CHANGE UP (ref 4.8–10.8)
WBC # FLD AUTO: 8.03 K/UL — SIGNIFICANT CHANGE UP (ref 4.8–10.8)

## 2022-06-23 PROCEDURE — 99215 OFFICE O/P EST HI 40 MIN: CPT

## 2022-06-23 PROCEDURE — 99284 EMERGENCY DEPT VISIT MOD MDM: CPT

## 2022-06-23 PROCEDURE — 93010 ELECTROCARDIOGRAM REPORT: CPT

## 2022-06-23 PROCEDURE — 90792 PSYCH DIAG EVAL W/MED SRVCS: CPT | Mod: 95

## 2022-06-23 NOTE — BH CONSULTATION LIAISON PROGRESS NOTE - NSBHCHARTREVIEWVS_PSY_A_CORE FT
Vital Signs Last 24 Hrs  T(C): 35.6 (23 Jun 2022 07:40), Max: 36.6 (23 Jun 2022 02:09)  T(F): 96 (23 Jun 2022 07:40), Max: 97.8 (23 Jun 2022 02:09)  HR: 57 (23 Jun 2022 07:40) (57 - 67)  BP: 104/51 (23 Jun 2022 07:40) (104/51 - 136/61)  BP(mean): --  RR: 16 (23 Jun 2022 07:40) (16 - 17)  SpO2: 98% (23 Jun 2022 07:40) (98% - 98%)
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
## 2022-06-23 NOTE — ED PROVIDER NOTE - NSFOLLOWUPINSTRUCTIONS_ED_ALL_ED_FT
Follow up with  [facility], located at [address] [place] [address], phone number - Call [phone] to make appointment themselves.    Please return to the Emergency Department if your symptoms change, if you have any thoughts about hurting yourself or others, or need to be re-evaluated.

## 2022-06-23 NOTE — ED BEHAVIORAL HEALTH ASSESSMENT NOTE - CURRENT ACTIVE IDEATION
Emergency Dept/Urgent Care discharge antibiotic (if prescribed): Cephalexin (Keflex) 500 mg capsule, 1 capsule (500 mg) by mouth 2 times daily for 7 days.  Date of Rx (if applicable):  7/20/20  No changes in treatment per Urine culture protocol.   Yes

## 2022-06-23 NOTE — ED PROVIDER NOTE - OBJECTIVE STATEMENT
26 y m, pmh of bipolar, pw si. Endorses losing his job 1 month ago, feeling depressed since. +hx of SI w hospital admissions. Endorses wants to use firearm to take his life, +access to firearm. Endorses hearing voices telling him to commit suicide. Denies f/c, cp, sob, n/v/d, abd pain dysuria

## 2022-06-23 NOTE — ED BEHAVIORAL HEALTH ASSESSMENT NOTE - NS ED BHA PLAN HOLD IN ED BH CONTACTED FT
attempted to reach Dr. Charisma Wilkes at [facility] ([phone]) but no answer, no option to leave VM

## 2022-06-23 NOTE — BH CONSULTATION LIAISON PROGRESS NOTE - NSBHATTESTSTAFFAMEND_PSY_A_CORE
I have personally seen and examined this patient. I fully participated in the care of this patient. I have made amendments to the documentation where appropriate and otherwise agree with the history, physical exam, and plan as documented by the

## 2022-06-23 NOTE — ED BEHAVIORAL HEALTH ASSESSMENT NOTE - REASON
reassessment after allowing substances to metabolize; obtain collateral from inpatient and outpatient providers

## 2022-06-23 NOTE — ED PROVIDER NOTE - NSFOLLOWUPCLINICS_GEN_ALL_ED_FT
[facility]  OP Mental Health  [address] [place] [id number]  Phone: [phone]  Fax:

## 2022-06-23 NOTE — ED PROVIDER NOTE - PATIENT PORTAL LINK FT
You can access the FollowMyHealth Patient Portal offered by [facility] by registering at the following website: [email or web address]. By joining [email or web address]’s FollowMyHealth portal, you will also be able to view your health information using other applications (apps) compatible with our system.

## 2022-06-23 NOTE — ED ADULT NURSE NOTE - OBJECTIVE STATEMENT
Pt is A&Ox4 came for SI and HI. Pt stated he hears "voice telling him to hurt others and himself". Pt denies having plan at this moment. Pt attempted to "overdosed himself" in the past. Pt is on 1:1 constant observation with PCA at bedside. Belongings sent to security. Labs collected. Comfort measure in place. Will continue to monitor pt . PROCEDURES:  Repair, hernia, umbilical, open, adult 11-Dec-2019 13:17:15  Paul Lowe

## 2022-06-23 NOTE — ED PROVIDER NOTE - PROGRESS NOTE DETAILS
JR: Patient endorsed to Dr. Brewer- plan f/u psych recs MM: Per psych reassessment, ok to discharge with outpatient follow up.

## 2022-06-23 NOTE — ED BEHAVIORAL HEALTH ASSESSMENT NOTE - DIFFERENTIAL
Substance-induced mood/psychotic disorder, schizoaffective disorder, depression, bipolar disorder, ASD

## 2022-06-23 NOTE — BH CONSULTATION LIAISON PROGRESS NOTE - NSBHCONSULTFOLLOWAFTERCARE_PSY_A_CORE FT
On discharge, can refer patient to [facility], located at [address] [place] [address], phone number - [phone] (patient can be given [phone] to make appointment themselves).

## 2022-06-23 NOTE — ED ADULT NURSE NOTE - CHIEF COMPLAINT QUOTE
pt co of SI/HI "for a long time, things arent going well." pt became agitated and uncooperative in triage. states he sees outpatient psych and noncompliant with meds. 1:1 initiated in triage

## 2022-06-23 NOTE — BH CONSULTATION LIAISON PROGRESS NOTE - NSBHFUPINTERVALCCFT_PSY_A_CORE
"I don't know...I'm alright...I've just got nothing to live for or work for. I've got no place to stay."

## 2022-06-23 NOTE — BH CONSULTATION LIAISON PROGRESS NOTE - NSBHATTESTCOMMENTATTENDFT_PSY_A_CORE
No
26-year-old  male, originally  with psychiatric history of suspected autism spectrum disorder, PTSD, bipolar disorder and substance use disorder (alcohol, cocaine, cannabis, ecstasy), multiple prior inpatient psychiatry admissions, in the setting of substance intoxication and overdose, most recent inpatient psychiatry discharge on 6/8 at [facility] seen today for suicidal ideation, which he reported upon his arrival to ED. On evaluation, he is observed to be awake, alert, oriented to situation and no active withdrawal symptoms. Reportedly he is currently homeless, upon his return from [place] last night, [place] ferry and came straight to hospital, and did mention not having a place to stay. He has had similar multiple presentations of coming to ED upon his return from [place], which strongly appears to be secondary gain behavior. He is not in withdrawal, pending urine toxicology. During the evaluation, there is no manic symptoms, there is no psychosis. He admits his mood is low, following the loss of his job about a month ago and the limited support in the community. On evaluation, he ruminates about his current living situation, but has no intent or plan and no homicidal ideation elicited, thus we recommend referral to [facility] outpatient psychiatry service referral and he is advised to continue depakote 250mg po bid which was previously sent to pharmacy. There is no psychiatric contraindication to discharge this patient. May consider SBIRT referral while in the ED and  for shelter referral.

## 2022-06-23 NOTE — ED BEHAVIORAL HEALTH ASSESSMENT NOTE - RISK ASSESSMENT
risk factors: SI, hx aggression, substance use, not compliant with tx, undomiciled, unemployed    protective factors: currently denies access to guns, help seeking Moderate Acute Suicide Risk

## 2022-06-23 NOTE — BH CONSULTATION LIAISON PROGRESS NOTE - NSBHCHARTREVIEWLAB_PSY_A_CORE FT
Complete Blood Count + Automated Diff (06.23.22 @ 02:38)   WBC Count: 8.03 K/uL   RBC Count: 4.80 M/uL   Hemoglobin: 13.4 g/dL   Hematocrit: 40.5 %   Mean Cell Volume: 84.4 fL   Mean Cell Hemoglobin: 27.9 pg   Mean Cell Hemoglobin Conc: 33.1 g/dL   Red Cell Distrib Width: 13.2 %   Platelet Count - Automated: 281 K/uL     Basic Metabolic Panel (06.23.22 @ 02:38)   Sodium, Serum: 139 mmol/L   Potassium, Serum: 4.0 mmol/L   Chloride, Serum: 106 mmol/L   Carbon Dioxide, Serum: 24 mmol/L   Anion Gap, Serum: 9 mmol/L   Blood Urea Nitrogen, Serum: 20 mg/dL   Creatinine, Serum: 1.0 mg/dL   Glucose, Serum: 110 mg/dL   Calcium, Total Serum: 9.2 mg/dL   eGFR: 106    Alcohol, Blood (06.23.22 @ 02:38)   Alcohol, Blood: <10 mg/dL

## 2022-06-23 NOTE — BH CONSULTATION LIAISON PROGRESS NOTE - NSBHADMITCOUNSEL_PSY_A_CORE
diagnostic results/impressions and/or recommended studies/instructions for management, treatment and follow up/importance of adherence to chosen treatment/risk factor reduction/client/family/caregiver education

## 2022-06-23 NOTE — ED ADULT TRIAGE NOTE - CHIEF COMPLAINT QUOTE
Mohs Histo Method Verbiage: Each section was then chromacoded and processed in the Mohs lab using the Mohs protocol and submitted for frozen section. pt co of SI/HI "for a long time, things arent going well." pt became agitated and uncooperative in triage. states he sees outpatient psych and noncompliant with meds. 1:1 initiated in triage

## 2022-06-23 NOTE — BH CONSULTATION LIAISON PROGRESS NOTE - NSBHFUPINTERVALHXFT_PSY_A_CORE
Patient was seen and evaluated. Chart was reviewed. Per chart no PRNs administered  Patient was seen and evaluated. Chart was reviewed. Per chart no PRNs administered since last evaluation. Upon approach, patient was lying in the hospital chair sleeping and there was a PCA 1:1. Patient reports feeling "alright" but then stated that he has nothing to live for or work for. States he was just in [place] yesterday where he smoked cannabis. When asked why he came to [place], he states, "I don't know. I used to live out here." He further states that he has no where to live now which is partly why he came to the ED. States he lost his job working at the [employer] 1 month ago due to "acting up." He states he has had thoughts of wanting to end his life by overdosing on K2. Denies ever attempting to end his life before or engaging in self-harming behavior. States he never picked up the medication he was discharged with from [facility] IPP in June because he forgot.     Spoke with patient's mother Jeimy Abad ([phone]): [facility] patient was last in [place] 1 week ago where he spent time in C[facility] at [facility] after smoking marijuana but was discharged on day 3. Mother reports she is not aware of any prior suicide attempts or self-harming behavior by patient. She reports he is currently homeless at the moment but sometimes stays with an uncle in the [place].

## 2022-06-23 NOTE — ED PROVIDER NOTE - CLINICAL SUMMARY MEDICAL DECISION MAKING FREE TEXT BOX
26-year-old male presents ED for concern for suicidal ideations.  Labs within normal limits psychiatry was consulted and cleared patient for discharge.  Patient given outpatient follow-up.  [facility]
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
## 2022-06-23 NOTE — BH CONSULTATION LIAISON PROGRESS NOTE - NSBHASSESSMENTFT_PSY_ALL_CORE
Recommendations:  - Patient is cleared psychiatrically  - On discharge, can refer patient to [facility], located at [address] [place] [address], phone number - [phone] (patient can be given [phone] to make appointment themselves). Cesario Gracia is a 26-year-old  male, originally from [place], [place], unemployed, currently homeless but spends most of his time in the [place], no dependents, with reported psychiatric history of suspected autism spectrum disorder, PTSD, bipolar disorder and substance use disorder (alcohol, cocaine, cannabis, ecstasy), multiple prior inpatient psychiatry admissions, in the setting of substance intoxication and overdose, most recent IPP discharge on 6/8 at [facility] presenting to the ED brought in by self after reporting he had suicidal ideation. Psychiatry consult for mental health evaluation.    On evaluation, patient's reported suicidal ideation appears to be in the context of having no place to stay and there may be a component of secondary gain. Per chart review and collateral, it appears patient has a pattern of presenting to the ED when having no place to stay. Patient has also been admitted to an inpatient psychiatric unit multiple times but does not follow up with outpatient appointments or takes his medications. It does not appear he would benefit from another inpatient psychiatry hospitalization. He possesses the protective factors of no previous suicide attempts, no previous self-harming behavior, no family history of suicide, willingness to seek help when he feels like he needs it, and having some family in the area that would allow him to stay with them. Patient would benefit most from outpatient psychiatric care and taking his prescribed medications.    Recommendations:  - Patient is cleared psychiatrically  - On discharge, can refer patient to [facility], located at [address] [place] [address], phone number - [phone] (patient can be given [phone] to make appointment themselves).
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
## 2022-06-23 NOTE — ED BEHAVIORAL HEALTH ASSESSMENT NOTE - OTHER PAST PSYCHIATRIC HISTORY (INCLUDE DETAILS REGARDING ONSET, COURSE OF ILLNESS, INPATIENT/OUTPATIENT TREATMENT)
Unspecifed/Other Bipolar | Schizophrenia | Other psychoactive substance related disorders | Autism Spectrum Disorder | Cannabis related disorders | Substance-Induced Depressive Disorder | Alcohol   related disorders | Delirium | Hallucinogen related disorders | Other Mental Disorders | PTSD | Unspecifed/Other Anxiety Disorder | Unspecifed/Other Psychotic Disorders

## 2022-06-23 NOTE — ED PROVIDER NOTE - ATTENDING CONTRIBUTION TO CARE
26M with PMH autism spectrum, schizophrenia, bipolar disorder, multiple prior IPP admissions, most recent 6/3-6/8 for SI/HI, firearm concern, substance use disorder (alcohol, cocaine, cannabis, ecstasy) who presents to [facility] for SI and auditory hallucination. He endorses access to firearms and wants to shoot himself in the face.  He is also endorsing command auditory hallucination [place] telling him to kill himself.  He denies homicidal ideations at this time.  Denies illicit drug use and reports compliance with his medications.    Vitals noted, triage note reviewed    Gen - NAD, Head - NCAT, Pharynx - clear, MMM, Heart - RRR, no m/g/r, Lungs - CTAB, no w/c/r, Abdomen - soft, NT, ND, Skin - No rash, Extremities - FROM, no edema, erythema, ecchymosis, brisk cap refill, Neuro - A&O x3, equal strength and sensation, non-focal exam, psych- flat affect, poor insight, poor judgement    a/p:  SI, 1:1  labs, telepsych eval

## 2022-06-23 NOTE — BH CONSULTATION LIAISON PROGRESS NOTE - NSICDXBHSECONDARYDX_PSY_ALL_CORE
PTSD (post-traumatic stress disorder)   F43.10  Bipolar disorder   F31.9  Substance use disorder   F19.90

## 2022-06-23 NOTE — ED ADULT NURSE REASSESSMENT NOTE - NS ED NURSE REASSESS COMMENT FT1
Pt is calm, vitals stable, tolerating meals. No indicators of distress and denies any complaints. 1:1 discontinued and patient discharged. Pt verbalized will take bus or taxi home.

## 2022-06-26 ENCOUNTER — EMERGENCY (EMERGENCY)
Facility: HOSPITAL | Age: 27
LOS: 0 days | Discharge: HOME | End: 2022-06-26
Attending: STUDENT IN AN ORGANIZED HEALTH CARE EDUCATION/TRAINING PROGRAM | Admitting: EMERGENCY MEDICINE

## 2022-06-26 VITALS
OXYGEN SATURATION: 98 % | HEART RATE: 80 BPM | RESPIRATION RATE: 18 BRPM | DIASTOLIC BLOOD PRESSURE: 64 MMHG | SYSTOLIC BLOOD PRESSURE: 105 MMHG | WEIGHT: 169.98 LBS | TEMPERATURE: 98 F | HEIGHT: 64 IN

## 2022-06-26 DIAGNOSIS — Z59.01 SHELTERED HOMELESSNESS: ICD-10-CM

## 2022-06-26 DIAGNOSIS — F17.200 NICOTINE DEPENDENCE, UNSPECIFIED, UNCOMPLICATED: ICD-10-CM

## 2022-06-26 DIAGNOSIS — R45.851 SUICIDAL IDEATIONS: ICD-10-CM

## 2022-06-26 DIAGNOSIS — F31.9 BIPOLAR DISORDER, UNSPECIFIED: ICD-10-CM

## 2022-06-26 DIAGNOSIS — Q21.1 ATRIAL SEPTAL DEFECT: ICD-10-CM

## 2022-06-26 DIAGNOSIS — F39 UNSPECIFIED MOOD [AFFECTIVE] DISORDER: ICD-10-CM

## 2022-06-26 DIAGNOSIS — Z86.79 PERSONAL HISTORY OF OTHER DISEASES OF THE CIRCULATORY SYSTEM: ICD-10-CM

## 2022-06-26 DIAGNOSIS — Z72.89 OTHER PROBLEMS RELATED TO LIFESTYLE: ICD-10-CM

## 2022-06-26 DIAGNOSIS — F19.94 OTHER PSYCHOACTIVE SUBSTANCE USE, UNSPECIFIED WITH PSYCHOACTIVE SUBSTANCE-INDUCED MOOD DISORDER: ICD-10-CM

## 2022-06-26 DIAGNOSIS — F90.9 ATTENTION-DEFICIT HYPERACTIVITY DISORDER, UNSPECIFIED TYPE: ICD-10-CM

## 2022-06-26 DIAGNOSIS — Z88.1 ALLERGY STATUS TO OTHER ANTIBIOTIC AGENTS STATUS: ICD-10-CM

## 2022-06-26 DIAGNOSIS — F43.10 POST-TRAUMATIC STRESS DISORDER, UNSPECIFIED: ICD-10-CM

## 2022-06-26 LAB
ANION GAP SERPL CALC-SCNC: 12 MMOL/L — SIGNIFICANT CHANGE UP (ref 7–14)
APAP SERPL-MCNC: <5 UG/ML — LOW (ref 10–30)
BASOPHILS # BLD AUTO: 0.03 K/UL — SIGNIFICANT CHANGE UP (ref 0–0.2)
BASOPHILS NFR BLD AUTO: 0.4 % — SIGNIFICANT CHANGE UP (ref 0–1)
BUN SERPL-MCNC: 18 MG/DL — SIGNIFICANT CHANGE UP (ref 10–20)
CALCIUM SERPL-MCNC: 9.5 MG/DL — SIGNIFICANT CHANGE UP (ref 8.5–10.1)
CHLORIDE SERPL-SCNC: 104 MMOL/L — SIGNIFICANT CHANGE UP (ref 98–110)
CO2 SERPL-SCNC: 24 MMOL/L — SIGNIFICANT CHANGE UP (ref 17–32)
CREAT SERPL-MCNC: 0.9 MG/DL — SIGNIFICANT CHANGE UP (ref 0.7–1.5)
EGFR: 121 ML/MIN/1.73M2 — SIGNIFICANT CHANGE UP
EOSINOPHIL # BLD AUTO: 0.24 K/UL — SIGNIFICANT CHANGE UP (ref 0–0.7)
EOSINOPHIL NFR BLD AUTO: 2.8 % — SIGNIFICANT CHANGE UP (ref 0–8)
ETHANOL SERPL-MCNC: <10 MG/DL — SIGNIFICANT CHANGE UP
GLUCOSE SERPL-MCNC: 104 MG/DL — HIGH (ref 70–99)
HCT VFR BLD CALC: 45.3 % — SIGNIFICANT CHANGE UP (ref 42–52)
HGB BLD-MCNC: 15 G/DL — SIGNIFICANT CHANGE UP (ref 14–18)
IMM GRANULOCYTES NFR BLD AUTO: 0.5 % — HIGH (ref 0.1–0.3)
LYMPHOCYTES # BLD AUTO: 2.18 K/UL — SIGNIFICANT CHANGE UP (ref 1.2–3.4)
LYMPHOCYTES # BLD AUTO: 25.5 % — SIGNIFICANT CHANGE UP (ref 20.5–51.1)
MCHC RBC-ENTMCNC: 27.8 PG — SIGNIFICANT CHANGE UP (ref 27–31)
MCHC RBC-ENTMCNC: 33.1 G/DL — SIGNIFICANT CHANGE UP (ref 32–37)
MCV RBC AUTO: 83.9 FL — SIGNIFICANT CHANGE UP (ref 80–94)
MONOCYTES # BLD AUTO: 0.6 K/UL — SIGNIFICANT CHANGE UP (ref 0.1–0.6)
MONOCYTES NFR BLD AUTO: 7 % — SIGNIFICANT CHANGE UP (ref 1.7–9.3)
NEUTROPHILS # BLD AUTO: 5.46 K/UL — SIGNIFICANT CHANGE UP (ref 1.4–6.5)
NEUTROPHILS NFR BLD AUTO: 63.8 % — SIGNIFICANT CHANGE UP (ref 42.2–75.2)
NRBC # BLD: 0 /100 WBCS — SIGNIFICANT CHANGE UP (ref 0–0)
PLATELET # BLD AUTO: 236 K/UL — SIGNIFICANT CHANGE UP (ref 130–400)
POTASSIUM SERPL-MCNC: 4.6 MMOL/L — SIGNIFICANT CHANGE UP (ref 3.5–5)
POTASSIUM SERPL-SCNC: 4.6 MMOL/L — SIGNIFICANT CHANGE UP (ref 3.5–5)
RBC # BLD: 5.4 M/UL — SIGNIFICANT CHANGE UP (ref 4.7–6.1)
RBC # FLD: 13.8 % — SIGNIFICANT CHANGE UP (ref 11.5–14.5)
SALICYLATES SERPL-MCNC: <0.3 MG/DL — LOW (ref 4–30)
SODIUM SERPL-SCNC: 140 MMOL/L — SIGNIFICANT CHANGE UP (ref 135–146)
VALPROATE SERPL-MCNC: <3 UG/ML — LOW (ref 50–100)
WBC # BLD: 8.55 K/UL — SIGNIFICANT CHANGE UP (ref 4.8–10.8)
WBC # FLD AUTO: 8.55 K/UL — SIGNIFICANT CHANGE UP (ref 4.8–10.8)

## 2022-06-26 PROCEDURE — 90792 PSYCH DIAG EVAL W/MED SRVCS: CPT | Mod: 95

## 2022-06-26 PROCEDURE — 99284 EMERGENCY DEPT VISIT MOD MDM: CPT

## 2022-06-26 SDOH — ECONOMIC STABILITY - HOUSING INSECURITY: SHELTERED HOMELESSNESS: Z59.01
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
## 2022-06-26 NOTE — ED BEHAVIORAL HEALTH NOTE - BEHAVIORAL HEALTH NOTE
==================  PRE-HOSPITAL COURSE  ===================  SOURCE:  RN and secondhand ED documentation.  DETAILS:  Per chart, patient was BIB self due to experiencing SI with thoughts of wanting to OD on Oxycodone, HI without plan/intent, and command AH telling him to kill himself.     ============  ED COURSE  ===========  SOURCE:  RN and secondhand ED documentation.  ARRIVAL:  Per chart and RN, patient arrived as a walk-in, no triage issues noted.  BELONGINGS:  No belongings of note. All belongings were provided to hospital security, and patient currently in a gown with a 1:1 staff member.  BEHAVIOR: RN described patient to be currently calm and cooperative, presenting with linear thought process, is fully AAOx3, presenting with stable mood and appropriate affect, remains in good behavioral and impulse control, is not currently violent/aggressive. RN stated that the patient is reporting SI with thoughts of wanting to OD on Oxycodone, reports HI without plan/intent, and AH telling him to kill himself, no VH. RN stated that there are no visible marks, bruises, or lacerations on the body. RN stated that the patient appears to be fairly-groomed.  TREATMENT:  Per chart and RN, patient did not require PRN medications.   VISITORS:  None     ========================  FOR EACH COLLATERAL   ========================  NAME:  Noris Abad  NUMBER: [phone]   RELATIONSHIP: Grandmother  COMMENTS: BTCM attempted to contact collateral however they were unavailable. BTCM left a v/m requesting a call back.    NAME:  Luis Angel Babcock  NUMBER: [phone]  RELATIONSHIP: Uncle  COMMENTS: BTCM attempted to contact collateral however they were unavailable. BTCM left a v/m requesting a call back.    NAME:  Jeimy Abad  NUMBER: [phone]  RELATIONSHIP: Mother  COMMENTS: BTCM attempted to contact collateral however they were unavailable. BTCM left a v/m requesting a call back.

## 2022-06-26 NOTE — ED BEHAVIORAL HEALTH ASSESSMENT NOTE - NS ED BHA TELEPSYCH PROVIDER LOCATION
Detail Level: Simple
Initiate Treatment: Apply hydrocortisone twice daily to affected areas on legs
HOME

## 2022-06-26 NOTE — BH CONSULTATION LIAISON PROGRESS NOTE - TELEPSYCHIATRY?
Forwarded to Dr. Ventura. Pharmacy selected and Rx pending.  
From: Anh Raymond  Sent: 5/28/2019 5:25 PM CDT  To: YOLA Ventura  Nurse Msg Pool  Subject: RE: Medication Question    [facility] [address] would be good. It is the weirdest thing . I will come in if it keeps bothering me. Seems to be worse in the morning. Gets a bit better during the day but I wake up so tired. And am tired all day so must be an infection of some sort.     Thank you.  Jabari  ----- Message -----  From: Donna Ventura MD  Sent: 5/28/2019 10:36 AM CDT  To: Anh Raymond  Subject: RE: Medication Question  Jabari,     We can call something in this time. Which pharmacy do you want it to go to? If you do not get better, come in so I can take a look at you.      ----- Message -----   From: Anh Raymond   Sent: 5/25/2019 9:44 AM CDT   To: Donna Ventura MD  Subject: Medication Question    [facility] Dr. Ventura,    All of my daughters family have been sick. My daughter had sinus infection last weekend and her  had strep. Now I am feeling awful with a sinus headache and infectious looking green buggers. Can I get a prescription or do I need to be seen? I just know I got what they had. My throat is OK so far so maybe just the sinus infection. Thanks for your help as always.    Jabari    
No

## 2022-06-26 NOTE — ED PROVIDER NOTE - PROGRESS NOTE DETAILS
EP: The patient is sleeping in a chair, psychiatry evaluation is pending.  Case endorsed to Dr. Mitchell to follow-up consult and dispo. TL: s/o from [facility] spoke with psych team and will re-evaluate patient. TL: Patient evaluated by psych and cleared for discharge.

## 2022-06-26 NOTE — ED BEHAVIORAL HEALTH ASSESSMENT NOTE - OTHER
reports he is homeless see  collateral note, attempted to reach 3 individuals, unable to reach deferred concrete not formerly assessed

## 2022-06-26 NOTE — ED ADULT TRIAGE NOTE - CHIEF COMPLAINT QUOTE
Pt presents to the ED c/o of Suicidal thoughts. Pt states he lost some people that were close to him recently and hes been "going through it". Pt endorses having suicidal ideations. PT doesn't have a clear plan but states he would probably try and overdose on drugs. Pt denies any Homicidal ideation or thoughts of harming others. PT denies any auditory of visual hallucinations.
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
## 2022-06-26 NOTE — BH CONSULTATION LIAISON PROGRESS NOTE - NSBHCHARTREVIEWLAB_PSY_A_CORE FT
06-26    140  |  104  |  18  ----------------------------<  104<H>  4.6   |  24  |  0.9    Ca    9.5      26 Jun 2022 03:18      CBC Full  -  ( 26 Jun 2022 03:18 )  WBC Count : 8.55 K/uL  RBC Count : 5.40 M/uL  Hemoglobin : 15.0 g/dL  Hematocrit : 45.3 %  Platelet Count - Automated : 236 K/uL  Mean Cell Volume : 83.9 fL  Mean Cell Hemoglobin : 27.8 pg  Mean Cell Hemoglobin Concentration : 33.1 g/dL  Auto Neutrophil # : 5.46 K/uL  Auto Lymphocyte # : 2.18 K/uL  Auto Monocyte # : 0.60 K/uL  Auto Eosinophil # : 0.24 K/uL  Auto Basophil # : 0.03 K/uL  Auto Neutrophil % : 63.8 %  Auto Lymphocyte % : 25.5 %  Auto Monocyte % : 7.0 %  Auto Eosinophil % : 2.8 %  Auto Basophil % : 0.4 %

## 2022-06-26 NOTE — ED PROVIDER NOTE - OBJECTIVE STATEMENT
26 yold male to ED Pmhx bipolar disorder currently on depakote and seraquel c/o suicidal ideations and hearing voices; pt currently undomiciled - was living with uncle until recently kicked out; admit to alcohol and drugs occasionally; pt denies n/v abdominal pain;
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
## 2022-06-26 NOTE — BH CONSULTATION LIAISON PROGRESS NOTE - DETAILS
Patient reports thoughts of overdosing on pills. Collateral stated that he often states he will jump from a bridge when he is depressed, becoming "better" after going to the hospital for a few days. Patient's report of suicidality is vague and without strong intent or desire. Patient initially reported thoughts of overdosing on pills. Collateral stated that he often states he will jump from a bridge when he is depressed, becoming "better" after going to the hospital for a few days. Patient's report of suicidality is vague and without strong intent or desire. On later evaluation, reported that he does not have true desire, but passive thoughts.

## 2022-06-26 NOTE — ED BEHAVIORAL HEALTH ASSESSMENT NOTE - NS ED BHA PLAN HOLD IN ED BH CONTACTED FT
not available, can attempt-- attempted to reach Dr. Charisma Wilkes at [facility] ([phone]) but no answer, no option to leave VM

## 2022-06-26 NOTE — ED PROVIDER NOTE - PATIENT PORTAL LINK FT
You can access the FollowMyHealth Patient Portal offered by [facility] by registering at the following website: [email or web address]. By joining [employer]’s FollowMyHealth portal, you will also be able to view your health information using other applications (apps) compatible with our system.

## 2022-06-26 NOTE — ED PROVIDER NOTE - CLINICAL SUMMARY MEDICAL DECISION MAKING FREE TEXT BOX
Patient endorsed to me by Dr. Tenorio.  26 yold male to ED Pmhx bipolar disorder currently on depakote and seraquel c/o suicidal ideations. Labs reviewed. Patient cleared by psychiatry for outpatient follow up/management. return precautions discussed with patient. given list of shelters in the surrounding areas.

## 2022-06-26 NOTE — BH CONSULTATION LIAISON PROGRESS NOTE - NSBHCHARTREVIEWVS_PSY_A_CORE FT
Vital Signs Last 24 Hrs  T(C): 36.4 (26 Jun 2022 00:58), Max: 36.4 (26 Jun 2022 00:58)  T(F): 97.6 (26 Jun 2022 00:58), Max: 97.6 (26 Jun 2022 00:58)  HR: 80 (26 Jun 2022 00:58) (80 - 80)  BP: 105/64 (26 Jun 2022 00:58) (105/64 - 105/64)  BP(mean): --  RR: 18 (26 Jun 2022 00:58) (18 - 18)  SpO2: 98% (26 Jun 2022 00:58) (98% - 98%)

## 2022-06-26 NOTE — ED PROVIDER NOTE - NSFOLLOWUPCLINICS_GEN_ALL_ED_FT
[facility]  OP Mental Health  [address] [place] [id number]  Phone: [phone]  Fax:
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
## 2022-06-26 NOTE — ED BEHAVIORAL HEALTH ASSESSMENT NOTE - RISK ASSESSMENT
Moderate Acute Suicide Risk RF: undomiciled, unemployed, not compliant with treatment, hx of aggression and impulsivity  PF: help seeking, some future oriented thinking

## 2022-06-26 NOTE — ED PROVIDER NOTE - NSFOLLOWUPINSTRUCTIONS_ED_ALL_ED_FT
Mood Disorders    WHAT YOU NEED TO KNOW:    A mood disorder, or affective disorder, is a condition that causes your mood or emotions to be out of control. Your mood can affect your personality and how you act. It can also affect how you feel about yourself and life in general.    DISCHARGE INSTRUCTIONS:    Medicines:     Medicines can help control your moods.      Take your medicine as directed. Contact your healthcare provider if you think your medicine is not helping or if you have side effects. Tell him of her if you are allergic to any medicine. Keep a list of the medicines, vitamins, and herbs you take. Include the amounts, and when and why you take them. Bring the list or the pill bottles to follow-up visits. Carry your medicine list with you in case of an emergency.    Follow up with your healthcare provider as directed: You may need to return for regular visits or blood tests. Write down your questions so you remember to ask them during your visits.     Self-care:     Try to get 6 to 8 hours of sleep each night. Contact your healthcare provider if you have trouble sleeping.       Manage your stress. Learn new ways to relax, such as deep breathing or meditation.      Talk to someone about how you feel. Join a support group. Talk to your healthcare provider, family, or friends about your feelings. Tell them about things that upset you.       Exercise regularly. Ask about the best exercise plan for you. Most healthcare providers recommend 30 minutes each day, 5 days a week. Exercise helps to lower stress and manage your moods.    Contact your healthcare provider if:     You are depressed.      You feel anxious or worried.      You begin to drink alcohol, or you drink more than usual.      You take illegal drugs.      You take medicines that are not prescribed to you.      Your medicine causes you to feel drowsy, keeps you awake, or affects how much you eat.      You have questions or concerns about your condition or care.    Return to the emergency department if:     You have severe depression.      You want to hurt yourself or others.

## 2022-06-26 NOTE — ED BEHAVIORAL HEALTH ASSESSMENT NOTE - DESCRIPTION
As per  collateral note.     COVID:   Unknown COVID positive contacts  Unknown travel outside of [place] state  Per records has received Moderna, Pfizer, and a booster as per hpi

## 2022-06-26 NOTE — ED PROVIDER NOTE - NS ED MD DISPO DISCHARGE
[address] [place] [address]                       PREOPERATIVE HISTORY AND PHYSICAL    PATIENT NAME: Christiano Ybarra                     :        1960  MED REC NO:   [id number]                           ROOM:  ACCOUNT NO:   [de-identified]                           ADMIT DATE: 2019  PROVIDER:     Jack Alonzo M.D. PROCEDURES:  Esophagogastroduodenoscopy and colonoscopy. INDICATION:  The patient is a 51-year-old pleasant male who has nausea,  vomiting, left-sided abdominal pain, blood in the stool. He is  undergoing further evaluation. Denied any painful swallowing. PAST MEDICAL HISTORY:  Bipolar disorder, chronic obstructive pulmonary  disease, hypertension. PAST SURGICAL HISTORY:  Right foot surgery, tonsillectomy,  adenoidectomy. MEDICATIONS:  Reviewed. PHYSICAL EXAMINATION:  VITALS:  Temperature 98.3, pulse 109, respiratory 20, blood pressure  114/80. HEART:  Regular. Normal S1 and S2. No S3.  LUNGS:  Equal to expansion on inspection. Warren Roof air entry bilaterally. ABDOMEN:  Soft. Normoactive bowel sounds. Nontender. Not distended. IMPRESSION:  A 62year-old pleasant male with nausea, vomiting,  left-sided abdominal pain, blood in the stool. He is undergoing further  evaluation. RECOMMENDATIONS:  My recommendations are to keep the patient nothing by  mouth, proceed with EGD and colonoscopy as planned. The risks including  but not limited to perforation, bleeding, infection, adverse reaction to  medicine, very slight chance of missing significant lesions. The  patient expressed his understanding. Further plans pending the above  test results.         Lynn Louis M.D.    D: 2019 10:48:14       T: 2019 10:59:23     [id number]  Job#: [id number]     Doc#: [id number]    CC: Home

## 2022-06-26 NOTE — ED PROVIDER NOTE - ATTENDING APP SHARED VISIT CONTRIBUTION OF CARE
26-year-old male past med history of bipolar disorder, currently homeless presenting for evaluation of suicidal ideations.  Patient states he is hearing voices that are telling him to hurt himself.  She denies any physical complaints.  Of note he was seen here on 6/23/2022 for similar complaints, was seen by psychiatry and cleared for discharge.  Well-appearing well-nourished, NAD, head AT/NC, PERRL, pink conjunctivae,  mmm,  normal work of breathing, speaking full sentences, RRR, well perfused extremities, abdomen soft, NT/ND, BS present in all quadrants, no midline spine or CVA ttp,  A&Ox3, no gross neuro deficits. Plan: Labs, psychiatry evaluation, dispo accordingly.

## 2022-07-02 ENCOUNTER — INPATIENT (INPATIENT)
Facility: HOSPITAL | Age: 27
LOS: 11 days | Discharge: HOME | End: 2022-07-14
Attending: PSYCHIATRY & NEUROLOGY | Admitting: PSYCHIATRY & NEUROLOGY

## 2022-07-02 VITALS
SYSTOLIC BLOOD PRESSURE: 105 MMHG | OXYGEN SATURATION: 98 % | WEIGHT: 169.98 LBS | DIASTOLIC BLOOD PRESSURE: 58 MMHG | RESPIRATION RATE: 18 BRPM | HEIGHT: 64 IN | TEMPERATURE: 98 F | HEART RATE: 85 BPM

## 2022-07-02 DIAGNOSIS — F15.20 OTHER STIMULANT DEPENDENCE, UNCOMPLICATED: ICD-10-CM

## 2022-07-02 DIAGNOSIS — F12.20 CANNABIS DEPENDENCE, UNCOMPLICATED: ICD-10-CM

## 2022-07-02 DIAGNOSIS — F43.10 POST-TRAUMATIC STRESS DISORDER, UNSPECIFIED: ICD-10-CM

## 2022-07-02 DIAGNOSIS — F31.4 BIPOLAR DISORDER, CURRENT EPISODE DEPRESSED, SEVERE, WITHOUT PSYCHOTIC FEATURES: ICD-10-CM

## 2022-07-02 DIAGNOSIS — F10.20 ALCOHOL DEPENDENCE, UNCOMPLICATED: ICD-10-CM

## 2022-07-02 LAB
ALBUMIN SERPL ELPH-MCNC: 4.8 G/DL — SIGNIFICANT CHANGE UP (ref 3.5–5.2)
ALP SERPL-CCNC: 67 U/L — SIGNIFICANT CHANGE UP (ref 30–115)
ALT FLD-CCNC: 13 U/L — SIGNIFICANT CHANGE UP (ref 0–41)
ANION GAP SERPL CALC-SCNC: 11 MMOL/L — SIGNIFICANT CHANGE UP (ref 7–14)
APAP SERPL-MCNC: <5 UG/ML — LOW (ref 10–30)
AST SERPL-CCNC: 21 U/L — SIGNIFICANT CHANGE UP (ref 0–41)
BASOPHILS # BLD AUTO: 0.04 K/UL — SIGNIFICANT CHANGE UP (ref 0–0.2)
BASOPHILS NFR BLD AUTO: 0.6 % — SIGNIFICANT CHANGE UP (ref 0–1)
BILIRUB SERPL-MCNC: <0.2 MG/DL — SIGNIFICANT CHANGE UP (ref 0.2–1.2)
BUN SERPL-MCNC: 16 MG/DL — SIGNIFICANT CHANGE UP (ref 10–20)
CALCIUM SERPL-MCNC: 9.9 MG/DL — SIGNIFICANT CHANGE UP (ref 8.5–10.1)
CHLORIDE SERPL-SCNC: 105 MMOL/L — SIGNIFICANT CHANGE UP (ref 98–110)
CO2 SERPL-SCNC: 24 MMOL/L — SIGNIFICANT CHANGE UP (ref 17–32)
CREAT SERPL-MCNC: 0.9 MG/DL — SIGNIFICANT CHANGE UP (ref 0.7–1.5)
EGFR: 121 ML/MIN/1.73M2 — SIGNIFICANT CHANGE UP
EOSINOPHIL # BLD AUTO: 0.19 K/UL — SIGNIFICANT CHANGE UP (ref 0–0.7)
EOSINOPHIL NFR BLD AUTO: 3 % — SIGNIFICANT CHANGE UP (ref 0–8)
ETHANOL SERPL-MCNC: <10 MG/DL — SIGNIFICANT CHANGE UP
GLUCOSE SERPL-MCNC: 103 MG/DL — HIGH (ref 70–99)
HCT VFR BLD CALC: 41.8 % — LOW (ref 42–52)
HGB BLD-MCNC: 13.9 G/DL — LOW (ref 14–18)
IMM GRANULOCYTES NFR BLD AUTO: 0.3 % — SIGNIFICANT CHANGE UP (ref 0.1–0.3)
LYMPHOCYTES # BLD AUTO: 1.57 K/UL — SIGNIFICANT CHANGE UP (ref 1.2–3.4)
LYMPHOCYTES # BLD AUTO: 24.7 % — SIGNIFICANT CHANGE UP (ref 20.5–51.1)
MCHC RBC-ENTMCNC: 27.7 PG — SIGNIFICANT CHANGE UP (ref 27–31)
MCHC RBC-ENTMCNC: 33.3 G/DL — SIGNIFICANT CHANGE UP (ref 32–37)
MCV RBC AUTO: 83.4 FL — SIGNIFICANT CHANGE UP (ref 80–94)
MONOCYTES # BLD AUTO: 0.44 K/UL — SIGNIFICANT CHANGE UP (ref 0.1–0.6)
MONOCYTES NFR BLD AUTO: 6.9 % — SIGNIFICANT CHANGE UP (ref 1.7–9.3)
NEUTROPHILS # BLD AUTO: 4.1 K/UL — SIGNIFICANT CHANGE UP (ref 1.4–6.5)
NEUTROPHILS NFR BLD AUTO: 64.5 % — SIGNIFICANT CHANGE UP (ref 42.2–75.2)
NRBC # BLD: 0 /100 WBCS — SIGNIFICANT CHANGE UP (ref 0–0)
PLATELET # BLD AUTO: 247 K/UL — SIGNIFICANT CHANGE UP (ref 130–400)
POTASSIUM SERPL-MCNC: 4.5 MMOL/L — SIGNIFICANT CHANGE UP (ref 3.5–5)
POTASSIUM SERPL-SCNC: 4.5 MMOL/L — SIGNIFICANT CHANGE UP (ref 3.5–5)
PROT SERPL-MCNC: 7.1 G/DL — SIGNIFICANT CHANGE UP (ref 6–8)
RBC # BLD: 5.01 M/UL — SIGNIFICANT CHANGE UP (ref 4.7–6.1)
RBC # FLD: 13.3 % — SIGNIFICANT CHANGE UP (ref 11.5–14.5)
SALICYLATES SERPL-MCNC: <0.3 MG/DL — LOW (ref 4–30)
SARS-COV-2 RNA SPEC QL NAA+PROBE: SIGNIFICANT CHANGE UP
SODIUM SERPL-SCNC: 140 MMOL/L — SIGNIFICANT CHANGE UP (ref 135–146)
VALPROATE SERPL-MCNC: <3 UG/ML — LOW (ref 50–100)
WBC # BLD: 6.36 K/UL — SIGNIFICANT CHANGE UP (ref 4.8–10.8)
WBC # FLD AUTO: 6.36 K/UL — SIGNIFICANT CHANGE UP (ref 4.8–10.8)

## 2022-07-02 PROCEDURE — 99285 EMERGENCY DEPT VISIT HI MDM: CPT

## 2022-07-02 PROCEDURE — 93010 ELECTROCARDIOGRAM REPORT: CPT

## 2022-07-02 RX ORDER — HALOPERIDOL DECANOATE 100 MG/ML
5 INJECTION INTRAMUSCULAR EVERY 6 HOURS
Refills: 0 | Status: DISCONTINUED | OUTPATIENT
Start: 2022-07-03 | End: 2022-07-14

## 2022-07-02 RX ORDER — DIPHENHYDRAMINE HCL 50 MG
50 CAPSULE ORAL EVERY 6 HOURS
Refills: 0 | Status: DISCONTINUED | OUTPATIENT
Start: 2022-07-02 | End: 2022-07-04

## 2022-07-02 RX ORDER — HALOPERIDOL DECANOATE 100 MG/ML
5 INJECTION INTRAMUSCULAR EVERY 6 HOURS
Refills: 0 | Status: DISCONTINUED | OUTPATIENT
Start: 2022-07-02 | End: 2022-07-03

## 2022-07-02 NOTE — ED BEHAVIORAL HEALTH ASSESSMENT NOTE - RISK ASSESSMENT
High Acute Suicide Risk The patient presents with risk factors that include prior SA, prior hospitalizations, active substance abuse, active SI with plan and intent, homelessness, without a job, without social support.  Protective factors include help-seeking behavior, desire to resume treatment, support from his mother at a distance.  The patient presents as acutely high risk.

## 2022-07-02 NOTE — ED BEHAVIORAL HEALTH ASSESSMENT NOTE - PREPARATORY ACTS:
Pt was to receive dialysis today. Dialysis RN wanted pt evaluated before receiving hemodialysis.    Yes > 3 months ago
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
## 2022-07-02 NOTE — ED PROVIDER NOTE - PROGRESS NOTE DETAILS
ED Attending SHASHA Madrid  telepsych aware of pt, will evaluate. ED Attending SHASHA Madrid  Telepsych report involuntary admission, forms completed, ekg faxed, pending transfer for admission. Pt endorsed to Dr. Morales.

## 2022-07-02 NOTE — H&P ADULT - NSHPSOCIALHISTORY_GEN_ALL_CORE
Tobacco use:   EtOH use:  Illicit drug use:  Marital Status: Tobacco use: denies  EtOH use: admits to drinking once a week. Last drink yesterday which was reportedly "2-3 cups of rum"  Illicit drug use: admits to cannabis and cocaine use. Last cocaine use 3 mon ago

## 2022-07-02 NOTE — ED BEHAVIORAL HEALTH NOTE - BEHAVIORAL HEALTH NOTE
===================     PRE-HOSPITAL COURSE     ===================     SOURCE: Nurse     DETAILS: The patient arrived at the ER stating that he was feeling more depressed and having suicidal thoughts; no intent or plan. The patient admitted to having several drinks earlier in the day    ============     ED COURSE     ============     SOURCE: Nurse     ARRIVAL: Walk in      BELONGINGS: Locked up with security     BEHAVIOR: Compliant     TREATMENT: None     VISITORS: None     ========================     FOR EACH COLLATERAL: Unable to obtain collateral at this time as calls went to      ========================     NAME: Jeimy     NUMBER: [phone] (Jeimy) or [phone] (Noris)     RELATIONSHIP: Mother and Grandmother
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
## 2022-07-02 NOTE — ED PROVIDER NOTE - CLINICAL SUMMARY MEDICAL DECISION MAKING FREE TEXT BOX
I have fully discussed the medical management and delivery of care with the patient. I have discussed any available labs, imaging and treatment options with the patient.  Pt admitted for further care & management as discussed with psych.

## 2022-07-02 NOTE — ED PROVIDER NOTE - OBJECTIVE STATEMENT
27 yo male with pmh of bipolar disorder, on seroquel & depakote presenting to the ED for suicidal ideation. States that it has been worsening over the past few days, admits to having several drinks today mostly rum. also smoked marijuana tonight. states that he thinks about overdosing on drugs as a suicide attempt.

## 2022-07-02 NOTE — PATIENT PROFILE BEHAVIORAL HEALTH - ALCOHOL USE HISTORY SINGLE SELECT
[de-identified] : This patient has pain over the lateral epicondyle.  It is not improving with rest and conservative management.  I would like to obtain an MRI of the left knee to evaluate for a lateral collateral ligament injury.  In the meantime I placed him in a range of motion knee brace with side hinges.  Naproxen was prescribed to be taken in lieu of the over-the-counter NSAID that he is currently taking.  Follow-up was recommended after obtaining the MRI so that we can review imaging and discuss next steps. yes...

## 2022-07-02 NOTE — H&P ADULT - NSHPLABSRESULTS_GEN_ALL_CORE
13.9   6.36  )-----------( 247      ( 02 Jul 2022 01:30 )             41.8       07-02    140  |  105  |  16  ----------------------------<  103<H>  4.5   |  24  |  0.9    Ca    9.9      02 Jul 2022 01:30    TPro  7.1  /  Alb  4.8  /  TBili  <0.2  /  DBili  x   /  AST  21  /  ALT  13  /  AlkPhos  67  07-02

## 2022-07-02 NOTE — ED BEHAVIORAL HEALTH ASSESSMENT NOTE - DESCRIPTION
the patient remains calm, cooperative, gave bloodwork none homeless, recently fired from his job as a

## 2022-07-02 NOTE — ED PROVIDER NOTE - CARE PLAN
Assessment and plan of treatment:	Plan: 1:1, EKG, labs, psych consult, reassess.   1 Principal Discharge DX:	Suicidal ideation  Assessment and plan of treatment:	Plan: 1:1, EKG, labs, psych consult, reassess.

## 2022-07-02 NOTE — ED BEHAVIORAL HEALTH ASSESSMENT NOTE - ADDITIONAL DETAILS ALL
patient with suicidal ideation with plan and intent (overdose), recent prior SA 3/2022 of intentional overdose and wanting to jump [place] the led[place]

## 2022-07-02 NOTE — ED BEHAVIORAL HEALTH NOTE - BEHAVIORAL HEALTH NOTE
Consult requested by EM Attending at 2:44. Telepsychiatry attempted to consult at 4:50 but unable to initiate due to delay in patient being set up on telepsychiatry device. ED staff aware.

## 2022-07-02 NOTE — H&P ADULT - NSHPPHYSICALEXAM_GEN_ALL_CORE
VITALS:   T(C): 36.3 (07-02-22 @ 17:00), Max: 36.9 (07-02-22 @ 00:22)  HR: 60 (07-02-22 @ 17:00) (60 - 85)  BP: 108/66 (07-02-22 @ 17:00) (105/58 - 108/66)  RR: 18 (07-02-22 @ 17:00) (16 - 18)  SpO2: 98% (07-02-22 @ 07:08) (98% - 98%)    GENERAL: NAD, lying in bed comfortably  HEAD:  Atraumatic, Normocephalic  EYES: EOMI, PERRLA, conjunctiva and sclera clear  ENT: Moist mucous membranes  NECK: Supple, No JVD  CHEST/LUNG: Clear to auscultation bilaterally; No rales, rhonchi, wheezing, or rubs. Unlabored respirations  HEART: Regular rate and rhythm; No murmurs, rubs, or gallops  ABDOMEN: Bowel sounds present; Soft, Nontender, Nondistended. No hepatomegally  EXTREMITIES:  2+ Peripheral Pulses, brisk capillary refill. No clubbing, cyanosis, or edema  NERVOUS SYSTEM:  Alert & Oriented X3, speech clear. No deficits   MSK: FROM all 4 extremities, full and equal strength  SKIN: No rashes or lesions VITALS:   T(C): 36.3 (07-02-22 @ 17:00), Max: 36.9 (07-02-22 @ 00:22)  HR: 60 (07-02-22 @ 17:00) (60 - 85)  BP: 108/66 (07-02-22 @ 17:00) (105/58 - 108/66)  RR: 18 (07-02-22 @ 17:00) (16 - 18)  SpO2: 98% (07-02-22 @ 07:08) (98% - 98%)    GENERAL: NAD, lying in bed comfortably  HEAD:  Atraumatic, Normocephalic  EYES: EOMI, conjunctiva and sclera clear  ENT: Moist mucous membranes  NECK: Supple  CHEST/LUNG: Clear to auscultation bilaterally, no wheezing, or rubs  HEART: Regular rate and rhythm; no rubs, or gallops  ABDOMEN: Bowel sounds present; Soft, Nontender, Nondistended  EXTREMITIES: No LE edema b/l  NERVOUS SYSTEM:  Alert & Oriented X3, speech clear. No gross deficits   MSK: FROM all 4 extremities, full and equal strength  SKIN: No rashes or lesions

## 2022-07-02 NOTE — ED BEHAVIORAL HEALTH ASSESSMENT NOTE - DIFFERENTIAL
Bipolar Disorder, Current Episode Depressed  r/o Schizophrenia  Alcohol Use Disorder, Severe  Cannabis Use Disorder, Severe  Amphetamine Use Disorder, Severe  R/o Substance Induced Depression
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
## 2022-07-02 NOTE — ED ADULT NURSE REASSESSMENT NOTE - NS ED NURSE REASSESS COMMENT FT1
patient resting in bed. being seen by telepsych at this time. no behavioral issue throughout the night  constant observation maintained.

## 2022-07-02 NOTE — ED BEHAVIORAL HEALTH ASSESSMENT NOTE - HOMICIDALITY / AGGRESSION (CURRENT/PAST)
Called Marisa to inform that ROXANA has been triggered and the recommendation for CRT-D generator change. We discussed generator change in detail and she would like to wait until May to have this completed. Informed her that we would prefer sooner or later given her dependency on device. She stated understanding. Will route to STORMY to verify medications to hold, then can route to scheduling.     Marli - Dr. Ahuja patient - how long to hold warfarin and ASA?    Marisa GORDON ist  [phone]    Doctor: Alex Ahuja MD    Diagnosis:      Medtronic CRT-D at ROXANA    Procedure: Medtronic CRT-D generator change    Duration of procedure: Protocol      Priority: Elective    Hibiclens Given: No: At H&P appointment    Device: ICD, Gent Change, BIV and Medtronic    Envelope Needed: N/A    Weekly INR's: No    Pre op INR: Yes    ETC: N/A     NABEEL or DCCV needed? N/A    Medications to Hold: Marli Miller PA-C to verify     Patient diabetic? No: Confirmed with patient    Patient Preference if Applicable: Wait until early May    Patient Post-Op F/U Location Preference: [place]    Device Check @ Pre OP Visit: Yes              Yes
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
## 2022-07-02 NOTE — H&P ADULT - HISTORY OF PRESENT ILLNESS
26-year-old man with PMH of heart murmur and past psych history of ASD, PTSD, bipolar disorder per chart, prior psych admissions (most recently to [facility] 4/30/22-5/3/22), hx substance use (alcohol, cannabis, MDMA) presented to ED c/o suicidal ideations. Psychiatry consulted and found pt with worsening depression and now with suicidal ideation with plan and intent to overdose due to stressors of death of his girlfriend 2 years ago, being homeless, and losing his job, and not having family support. Pt was recommended IPP admission for further stabilization of his symptoms.  Upon admission, pt denies fever, chills, N/V/D/C, CP, SOB, abd pain, urinary complaints  26-year-old man with PMH of heart murmur, substance use (alcohol, cannabis, MDMA), past psych history of ASD, PTSD, bipolar disorder per chart, prior psych admissions (most recently to [facility] 4/30/22-5/3/22),  presented to ED c/o suicidal ideations. Psychiatry consulted and found pt with worsening depression and now with suicidal ideation with plan and intent to overdose due to stressors of death of his girlfriend 2 years ago, being homeless, and losing his job, and not having family support. Pt was recommended IPP admission for further stabilization of his symptoms.  Upon admission pt denies fever, chills, N/V/D/C, CP, SOB, abd pain, weakness, dizziness, urinary complaints.

## 2022-07-02 NOTE — ED PROVIDER NOTE - PHYSICAL EXAMINATION
CONSTITUTIONAL: Well-developed; well-nourished; in no acute distress.   SKIN: warm, dry  HEAD: Normocephalic; atraumatic.  EYES: PERRL, EOMI, no conjunctival erythema  ENT: No nasal discharge; airway clear.  NECK: Supple; non tender.  CARD: S1, S2 normal; no murmurs, gallops, or rubs. Regular rate and rhythm.   RESP: No wheezes, rales or rhonchi.  ABD: soft ntnd  EXT: Normal ROM.  No clubbing, cyanosis or edema.  NEURO: Alert, oriented, grossly unremarkable. no fnd.  PSYCH: Cooperative, appropriate.

## 2022-07-02 NOTE — ED ADULT TRIAGE NOTE - CHIEF COMPLAINT QUOTE
Patient reports worsening depression, increase in alcohol intake and suicidal ideations. 1:1 initiates in triage.
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
## 2022-07-02 NOTE — H&P ADULT - ASSESSMENT
26-year-old man with PMH of heart murmur and past psych history of ASD, PTSD, bipolar disorder per chart, prior psych admissions (most recently to [facility] 4/30/22-5/3/22), hx substance use (alcohol, cannabis, MDMA) is being admitted to IPP for further stabilization of his symptoms.    # Suicidal ideations  - admit to IPP  - plan per primary team     26-year-old man with PMH of heart murmur, substance use (alcohol, cannabis, MDMA), past psych history of ASD, PTSD, bipolar disorder per chart, prior psych admissions (most recently to [facility] 4/30/22-5/3/22), is being admitted to IPP for further stabilization of his symptoms.    # Suicidal ideations  - admit to IPP  - plan per primary team    # Hx Bipolar disease  - plan per primary team    # Hx  PTSD  - plan per primary team    # Heart murmur  - not on medications  - outpatient follow up with cardiology    # Polysubstance use  - check utox  - outpatient follow up with addiction team if warranted

## 2022-07-03 LAB
BASOPHILS # BLD AUTO: 0.06 K/UL — SIGNIFICANT CHANGE UP (ref 0–0.2)
BASOPHILS NFR BLD AUTO: 0.7 % — SIGNIFICANT CHANGE UP (ref 0–1)
EOSINOPHIL # BLD AUTO: 0.33 K/UL — SIGNIFICANT CHANGE UP (ref 0–0.7)
EOSINOPHIL NFR BLD AUTO: 3.8 % — SIGNIFICANT CHANGE UP (ref 0–8)
HCT VFR BLD CALC: 44.4 % — SIGNIFICANT CHANGE UP (ref 42–52)
HGB BLD-MCNC: 14 G/DL — SIGNIFICANT CHANGE UP (ref 14–18)
IMM GRANULOCYTES NFR BLD AUTO: 0.2 % — SIGNIFICANT CHANGE UP (ref 0.1–0.3)
LYMPHOCYTES # BLD AUTO: 2.46 K/UL — SIGNIFICANT CHANGE UP (ref 1.2–3.4)
LYMPHOCYTES # BLD AUTO: 28.4 % — SIGNIFICANT CHANGE UP (ref 20.5–51.1)
MCHC RBC-ENTMCNC: 26.7 PG — LOW (ref 27–31)
MCHC RBC-ENTMCNC: 31.5 G/DL — LOW (ref 32–37)
MCV RBC AUTO: 84.6 FL — SIGNIFICANT CHANGE UP (ref 80–94)
MONOCYTES # BLD AUTO: 0.55 K/UL — SIGNIFICANT CHANGE UP (ref 0.1–0.6)
MONOCYTES NFR BLD AUTO: 6.4 % — SIGNIFICANT CHANGE UP (ref 1.7–9.3)
NEUTROPHILS # BLD AUTO: 5.23 K/UL — SIGNIFICANT CHANGE UP (ref 1.4–6.5)
NEUTROPHILS NFR BLD AUTO: 60.5 % — SIGNIFICANT CHANGE UP (ref 42.2–75.2)
NRBC # BLD: 0 /100 WBCS — SIGNIFICANT CHANGE UP (ref 0–0)
PLATELET # BLD AUTO: 250 K/UL — SIGNIFICANT CHANGE UP (ref 130–400)
RBC # BLD: 5.25 M/UL — SIGNIFICANT CHANGE UP (ref 4.7–6.1)
RBC # FLD: 13.2 % — SIGNIFICANT CHANGE UP (ref 11.5–14.5)
WBC # BLD: 8.65 K/UL — SIGNIFICANT CHANGE UP (ref 4.8–10.8)
WBC # FLD AUTO: 8.65 K/UL — SIGNIFICANT CHANGE UP (ref 4.8–10.8)

## 2022-07-03 PROCEDURE — 90792 PSYCH DIAG EVAL W/MED SRVCS: CPT

## 2022-07-03 RX ORDER — DIVALPROEX SODIUM 500 MG/1
500 TABLET, DELAYED RELEASE ORAL EVERY 12 HOURS
Refills: 0 | Status: DISCONTINUED | OUTPATIENT
Start: 2022-07-03 | End: 2022-07-14

## 2022-07-03 RX ORDER — DIVALPROEX SODIUM 500 MG/1
500 TABLET, DELAYED RELEASE ORAL
Refills: 0 | Status: DISCONTINUED | OUTPATIENT
Start: 2022-07-03 | End: 2022-07-03

## 2022-07-03 RX ORDER — DIPHENHYDRAMINE HCL 50 MG
50 CAPSULE ORAL EVERY 6 HOURS
Refills: 0 | Status: DISCONTINUED | OUTPATIENT
Start: 2022-07-03 | End: 2022-07-04

## 2022-07-03 RX ADMIN — DIVALPROEX SODIUM 500 MILLIGRAM(S): 500 TABLET, DELAYED RELEASE ORAL at 20:57

## 2022-07-03 RX ADMIN — Medication 2 MILLIGRAM(S): at 20:56

## 2022-07-03 NOTE — PROGRESS NOTE BEHAVIORAL HEALTH - OTHER
patient speaks softly and slurs words, sometimes difficult to understand however may be baseline accent not formally tested believes that he has special abilities, "a sixth sense" like his grandmother who is "psychic"
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
## 2022-07-03 NOTE — PROGRESS NOTE BEHAVIORAL HEALTH - BEHAVIOR
better  Likely d/t decreased oral intake with concurrent diuretic use and recent diverticulitis  Recently stopped mobic     Plan   S/p bolus and better  Hold chlorthalidone and lasix until tomorrow   Daily wt/accurate I&O        
Cooperative

## 2022-07-03 NOTE — PROGRESS NOTE BEHAVIORAL HEALTH - NSBHFUPIPCHARTREVFT_PSY_A_CORE
IPP Discharge Note from IPP on 6/8/22:    Discharge Medication Review:  I will START or STAY ON the medications listed below when I get home from the hospital:    divalproex sodium 500 mg oral delayed release tablet  -- 1 tab(s) by mouth 2 times a day x 14 days. Continue until told otherwise by your provider.  -- Indication: For Mood Lability.     I will STOP taking the medications listed below when I get home from the hospital:    QUEtiapine 50 mg oral tablet  -- 1 tab(s) by mouth once a day (at bedtime) x 30 days.  Continue until told otherwise by your provider.
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
## 2022-07-03 NOTE — PROGRESS NOTE BEHAVIORAL HEALTH - VIOLENCE RISK FACTORS:
Antisocial behavior/cognition (past or present)/Substance abuse/Noncompliance with treatment/Community stressors that increase the risk of destabilization
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
## 2022-07-04 LAB
A1C WITH ESTIMATED AVERAGE GLUCOSE RESULT: 5.2 % — SIGNIFICANT CHANGE UP (ref 4–5.6)
CHOLEST SERPL-MCNC: 207 MG/DL — HIGH
ESTIMATED AVERAGE GLUCOSE: 103 MG/DL — SIGNIFICANT CHANGE UP (ref 68–114)
HDLC SERPL-MCNC: 41 MG/DL — SIGNIFICANT CHANGE UP
LIPID PNL WITH DIRECT LDL SERPL: 133 MG/DL — HIGH
NON HDL CHOLESTEROL: 166 MG/DL — HIGH
TRIGL SERPL-MCNC: 167 MG/DL — HIGH

## 2022-07-04 PROCEDURE — 99232 SBSQ HOSP IP/OBS MODERATE 35: CPT | Mod: GC

## 2022-07-04 RX ORDER — DIPHENHYDRAMINE HCL 50 MG
50 CAPSULE ORAL EVERY 6 HOURS
Refills: 0 | Status: DISCONTINUED | OUTPATIENT
Start: 2022-07-04 | End: 2022-07-14

## 2022-07-04 RX ADMIN — HALOPERIDOL DECANOATE 5 MILLIGRAM(S): 100 INJECTION INTRAMUSCULAR at 09:02

## 2022-07-04 RX ADMIN — DIVALPROEX SODIUM 500 MILLIGRAM(S): 500 TABLET, DELAYED RELEASE ORAL at 08:25

## 2022-07-04 RX ADMIN — Medication 2 MILLIGRAM(S): at 09:01

## 2022-07-04 RX ADMIN — Medication 50 MILLIGRAM(S): at 09:02

## 2022-07-04 RX ADMIN — DIVALPROEX SODIUM 500 MILLIGRAM(S): 500 TABLET, DELAYED RELEASE ORAL at 20:16
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
## 2022-07-04 NOTE — PROGRESS NOTE BEHAVIORAL HEALTH - OTHER
patient speaks softly and slurs words, sometimes difficult to understand however may be baseline accent believes that he has special abilities, "a sixth sense" like his grandmother who is "psychic" not formally tested
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
## 2022-07-05 DIAGNOSIS — F19.10 OTHER PSYCHOACTIVE SUBSTANCE ABUSE, UNCOMPLICATED: ICD-10-CM

## 2022-07-05 DIAGNOSIS — F39 UNSPECIFIED MOOD [AFFECTIVE] DISORDER: ICD-10-CM

## 2022-07-05 DIAGNOSIS — F63.81 INTERMITTENT EXPLOSIVE DISORDER: ICD-10-CM

## 2022-07-05 LAB
AMPHET UR-MCNC: NEGATIVE — SIGNIFICANT CHANGE UP
APPEARANCE UR: CLEAR — SIGNIFICANT CHANGE UP
BACTERIA # UR AUTO: ABNORMAL
BARBITURATES UR SCN-MCNC: NEGATIVE — SIGNIFICANT CHANGE UP
BENZODIAZ UR-MCNC: NEGATIVE — SIGNIFICANT CHANGE UP
BILIRUB UR-MCNC: NEGATIVE — SIGNIFICANT CHANGE UP
COCAINE METAB.OTHER UR-MCNC: NEGATIVE — SIGNIFICANT CHANGE UP
COLOR SPEC: YELLOW — SIGNIFICANT CHANGE UP
DIFF PNL FLD: ABNORMAL
DRUG SCREEN 1, URINE RESULT: SIGNIFICANT CHANGE UP
EPI CELLS # UR: NEGATIVE — SIGNIFICANT CHANGE UP
FENTANYL UR QL: NEGATIVE — SIGNIFICANT CHANGE UP
GLUCOSE UR QL: NEGATIVE MG/DL — SIGNIFICANT CHANGE UP
KETONES UR-MCNC: ABNORMAL
LEUKOCYTE ESTERASE UR-ACNC: NEGATIVE — SIGNIFICANT CHANGE UP
METHADONE UR-MCNC: NEGATIVE — SIGNIFICANT CHANGE UP
NITRITE UR-MCNC: NEGATIVE — SIGNIFICANT CHANGE UP
OPIATES UR-MCNC: NEGATIVE — SIGNIFICANT CHANGE UP
OXYCODONE UR-MCNC: NEGATIVE — SIGNIFICANT CHANGE UP
PCP UR-MCNC: NEGATIVE — SIGNIFICANT CHANGE UP
PH UR: 7 — SIGNIFICANT CHANGE UP (ref 5–8)
PROPOXYPHENE QUALITATIVE URINE RESULT: NEGATIVE — SIGNIFICANT CHANGE UP
PROT UR-MCNC: NEGATIVE MG/DL — SIGNIFICANT CHANGE UP
RBC CASTS # UR COMP ASSIST: ABNORMAL /HPF
SP GR SPEC: 1.02 — SIGNIFICANT CHANGE UP (ref 1.01–1.03)
THC UR QL: NEGATIVE — SIGNIFICANT CHANGE UP
UROBILINOGEN FLD QL: 0.2 MG/DL — SIGNIFICANT CHANGE UP
WBC UR QL: SIGNIFICANT CHANGE UP /HPF

## 2022-07-05 PROCEDURE — 99232 SBSQ HOSP IP/OBS MODERATE 35: CPT

## 2022-07-05 RX ORDER — HYDROXYZINE HCL 10 MG
50 TABLET ORAL EVERY 6 HOURS
Refills: 0 | Status: DISCONTINUED | OUTPATIENT
Start: 2022-07-05 | End: 2022-07-14

## 2022-07-05 RX ADMIN — HALOPERIDOL DECANOATE 5 MILLIGRAM(S): 100 INJECTION INTRAMUSCULAR at 10:22

## 2022-07-05 RX ADMIN — Medication 2 MILLIGRAM(S): at 10:22

## 2022-07-05 RX ADMIN — DIVALPROEX SODIUM 500 MILLIGRAM(S): 500 TABLET, DELAYED RELEASE ORAL at 08:47

## 2022-07-05 RX ADMIN — DIVALPROEX SODIUM 500 MILLIGRAM(S): 500 TABLET, DELAYED RELEASE ORAL at 20:23
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
## 2022-07-05 NOTE — PROGRESS NOTE BEHAVIORAL HEALTH - NSBHADDHXPSYCHFT_PSY_A_CORE
See HPI
reported psychiatric history of suspected autism spectrum disorder, PTSD, bipolar disorder and substance use disorder (alcohol, cocaine, cannabis, ecstasy), multiple prior inpatient psychiatry admissions, in the setting of substance intoxication and overdose, most recent hospitalization was 6/3/22- 6/8/22 at [facility]; currently in outpt tx, unclear past suicide attempts vs accidental overdose

## 2022-07-05 NOTE — PROGRESS NOTE BEHAVIORAL HEALTH - NSBHADMITIPBHPROVFT_PSY_A_CORE
unable to reach Dr. Charisma Wilkes at [facility] [phone]. Keep being transferred. unable to reach Dr. Charisma Wilkes at [facility] [phone]. Keep being transferred.

## 2022-07-05 NOTE — PROGRESS NOTE BEHAVIORAL HEALTH - NSBHADDHXSUBSTFT_PSY_A_CORE
Per patient alcohol, cannabis, MDMA. BAL <10. Utox was not done on admission. Last admission utox negative.
ETOH, cocaine, ecstasy, cannabis use -- no history of detox or rehab
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
## 2022-07-05 NOTE — CHART NOTE - NSCHARTNOTEFT_GEN_A_CORE
Social Work Admit Note:    Patient is a 26 years of age male who was admitted for evaluation of depression and suicidal ideation.  At the time of assessment in the emergency department, patient informed that he has been increasingly depressed due to several psychosocial stressors including being homeless, losing his job, and having minimal social support.  He said he has been feeling hopeless and suicidal with plan/intent to overdose.  Patient informed his depression has led to an increase in substance use and he’s been drinking, smoking marijuana, and using ecstasy daily.  Patient was not able to contract for safety and was admitted to [facility] for safety and stabilization.      In the community, patient is currently undomiciled and unemployed.  He is single marital status and has no known dependents.  He has a past psychiatric history of PTSD and Bipolar Disorder.  He also has a history of substance use (alcohol, marijuana, MDMA).  He has multiple prior inpatient psychiatric admissions, most recently discharged from [facility] on 6/6/2022.  Patient was previously engaged in outpatient treatment with Dr. Wilkes at [facility], but he has been non-compliant with appointments/medications for the past several months.      Sexual History – Patient identifies as heterosexual       Family relationships and history –  Patient has limited social supports     Leisure Activity Assessment – Unable to assess     Community Supports –  None known    Employment – Patient is unemployed     Substance Use Assessment – Patient endorses use of alcohol, marijuana, and ecstasy.      History of suicidality or self- injurious behaviors – Yes     Significant Loses – Unable to assess     Life Goals – Patient verbalized goal of achieving and maintaining sobriety from substances             will continue to meet with patient 1:1 and with treatment team daily.  Discharge plan is continued mental health treatment in an outpatient setting.

## 2022-07-05 NOTE — PROGRESS NOTE BEHAVIORAL HEALTH - NSBHFUPINTERVALHXFT_PSY_A_CORE
Patient seen and evaluated on IPP. As per nursing report no acute events. On approach patient irritable. States he came to the hospital due to "living situations" When asked about his medications he was given when discharged, states he didn't take it due to drugs. States he was doing ecstasy. Drug screen was not done on admission. States he has been drinking. BAL <10. Denies any s/s of withdrawal. No s/s of withdrawal noted. When asked did he follow up wit outpatient. Patient sent in a cab from hospital to outpatient appointment. States he did not go and didn't elaborate. Patient states he wants to go to rehab. States he has no place to go and wants to get his job back. They may take him back if he goes to rehab. Denies suicidal/homicidal ideations. Denies any s/s of withdrawal. As with previous admissions patient is non compliant with medication. When patient has an issue with housing will come to the ED presenting with SI and HI. After a few days on the unit patient will start to cause trouble and request discharge. Patient has chronic issues with mood lability and a hx of impulsivity and aggressive behavior. He has difficulty regulating his emotions which is chronic and may be 2/2 patient possibly being on the Autism spectrum per patients mom last admission.    Writer later notified patient flirtatious with female staff and patients, making them feel uncomfortable as with previous admission. Patient easily agitated when asked to stop, yelling, posturing and punching walls. Patient placed on 1:1 offered and accepted PRNs. As with previous admissions hospitalizations appear to be precipitated by acute social stressors in the context of chronic predisposing factors including cognitive limitations, limited social engagement, and trauma, and perpetuating factors include substance use. All issues on the unit appear to be behavioral. Patient has chronic issues with impulsivity. Appears to have antisocial personality traits. He has difficulty regulating his emotions which is chronic and common with individuals who may be on the Autism spectrum.    Left voicemail message for patients Mother, Jeimy Feliciano ( [phone])

## 2022-07-06 PROCEDURE — 99231 SBSQ HOSP IP/OBS SF/LOW 25: CPT

## 2022-07-06 RX ADMIN — HALOPERIDOL DECANOATE 5 MILLIGRAM(S): 100 INJECTION INTRAMUSCULAR at 11:00

## 2022-07-06 RX ADMIN — Medication 2 MILLIGRAM(S): at 11:00

## 2022-07-06 RX ADMIN — DIVALPROEX SODIUM 500 MILLIGRAM(S): 500 TABLET, DELAYED RELEASE ORAL at 08:11

## 2022-07-06 RX ADMIN — Medication 50 MILLIGRAM(S): at 11:00

## 2022-07-06 RX ADMIN — DIVALPROEX SODIUM 500 MILLIGRAM(S): 500 TABLET, DELAYED RELEASE ORAL at 20:25

## 2022-07-06 RX ADMIN — Medication 50 MILLIGRAM(S): at 01:25

## 2022-07-06 NOTE — PROGRESS NOTE BEHAVIORAL HEALTH - NSBHFUPINTERVALHXFT_PSY_A_CORE
Patient seen and evaluated. As per nursing report patient continues to create issues on the unit. Patient very flirtatious with female staff. Gets agitated when attempts made to re-direct him. Attempts to have discussion about his inappropriate behavior unsuccessful due to his inability to regulate his emotions. On approach patient irritable. 1:1 in place. Denies suicidal/homicidal ideations. Denies A/V hallucinations. Patient does not present as psychotic. Denies any s/s of shawanda. Hospitalizations appear to be precipitated by acute social stressors in the context of chronic predisposing factors including cognitive limitations, limited social engagement, and trauma, and perpetuating factors include substance use. All issues on the unit appear to be behavioral. Patient has chronic issues with impulsivity. Appears to have antisocial personality traits. He has difficulty regulating his emotions which is chronic and common with individuals who may be on the Autism spectrum. Patient is compliant with medication. Future oriented and connected to outpatient in the community. Patient again states he wants to go to rehab. States he may be able to get his job back if he goes to rehab. Patient encouraged to be in good behavioral control and attend groups.    Writer noted patient irritable at times became increasingly agitated over other patients telling lies about him. Writer assessed patient. Patient becoming increasingly agitated. 1:1 in placed. Patient offered and accepted po prn of Haldol 5 Ativan 2 mg Benadryl 50 mg. Patient seen and evaluated. As per nursing report patient continues to create issues on the unit. Patient very flirtatious with female staff. Gets agitated when attempts made to re-direct him. Attempts to have discussion about his inappropriate behavior unsuccessful due to his inability to regulate his emotions. On approach patient irritable. 1:1 in place. Denies suicidal/homicidal ideations. Denies A/V hallucinations. Patient does not present as psychotic. Denies any s/s of shawanda. Hospitalizations appear to be precipitated by acute social stressors in the context of chronic predisposing factors including cognitive limitations, limited social engagement, and trauma, and perpetuating factors include substance use. All issues on the unit appear to be behavioral. Patient has chronic issues with impulsivity. Appears to have antisocial personality traits. He has difficulty regulating his emotions. Patient is compliant with medication. Future oriented and connected to outpatient in the community. Patient again states he wants to go to rehab. States he may be able to get his job back if he goes to rehab. Patient encouraged to be in good behavioral control and attend groups.    Writer noted patient irritable at times became increasingly agitated over other patients telling lies about him. Writer assessed patient. Patient becoming increasingly agitated. 1:1 in placed. Patient offered and accepted po prn of Haldol 5 Ativan 2 mg Benadryl 50 mg.

## 2022-07-06 NOTE — PROGRESS NOTE BEHAVIORAL HEALTH - NSBHADMITIPBHPROVFT_PSY_A_CORE
unable to reach Dr. Charisma Wilkes at [facility] [place] [phone]. Keep being transferred.
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
## 2022-07-06 NOTE — PROGRESS NOTE BEHAVIORAL HEALTH - NSBHATTESTBILLING_PSY_A_CORE
[id number]-Uresgejktr Inpatient care - moderate complexity - 25 minutes [id number]-Efdihjjhyq Inpatient care - low complexity - 15 minutes

## 2022-07-07 PROCEDURE — 99231 SBSQ HOSP IP/OBS SF/LOW 25: CPT

## 2022-07-07 RX ADMIN — DIVALPROEX SODIUM 500 MILLIGRAM(S): 500 TABLET, DELAYED RELEASE ORAL at 08:12

## 2022-07-07 RX ADMIN — DIVALPROEX SODIUM 500 MILLIGRAM(S): 500 TABLET, DELAYED RELEASE ORAL at 20:29

## 2022-07-07 RX ADMIN — Medication 50 MILLIGRAM(S): at 20:29

## 2022-07-07 NOTE — DISCHARGE NOTE BEHAVIORAL HEALTH - MEDICATION SUMMARY - MEDICATIONS TO STOP TAKING
Adequate: hears normal conversation without difficulty I will STOP taking the medications listed below when I get home from the hospital:  None

## 2022-07-07 NOTE — PROGRESS NOTE BEHAVIORAL HEALTH - NSBHFUPINTERVALHXFT_PSY_A_CORE
Patient seen and evaluated. As per nursing report no issues last night. On approach patient calm and cooperative. No agitation or aggression noted. 1:1 in place. Denies suicidal/homicidal ideations. Denies A/V hallucinations. Patient does not present as psychotic. Denies any s/s of shawanda. Hospitalizations appear to be precipitated by acute social stressors in the context of chronic predisposing factors including cognitive limitations, limited social engagement, and trauma, and perpetuating factors include substance use. All issues on the unit appear to be behavioral. Patient has chronic issues with impulsivity. Appears to have antisocial personality traits. He has difficulty regulating his emotions. Patient is compliant with medication. Future oriented and connected to outpatient in the community. Patient again states he wants to go to rehab. CATCH team met with patient yesterday. States he may be able to get his job back if he goes to rehab. Patient encouraged to be in good behavioral control and attend groups. 1:1 d/c’d. Patient seen and evaluated. As per nursing report no issues last night. On approach patient calm and cooperative. No agitation or aggression noted. 1:1 in place. Denies suicidal/homicidal ideations. Denies A/V hallucinations. Patient does not present as psychotic. Denies any s/s of shawanda. Hospitalizations appear to be precipitated by acute social stressors in the context of chronic predisposing factors including cognitive limitations, limited social engagement, and trauma, and perpetuating factors include substance use. All issues on the unit appear to be behavioral. Patient has chronic issues with impulsivity. Appears to have antisocial personality traits. He has difficulty regulating his emotions. Patient is compliant with medication. Future oriented and connected to outpatient in the community. Patient again states he wants to go to rehab. CATCH team met with patient yesterday. States he may be able to get his job back if he goes to rehab. Patient encouraged to be in good behavioral control and attend groups. 1:1 d/c’d.    Patient in good behavioral control today
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
## 2022-07-07 NOTE — PROGRESS NOTE BEHAVIORAL HEALTH - NSBHATTESTBILLING_PSY_A_CORE
[id number]-Vzvggkhjrx Inpatient care - low complexity - 15 minutes Adequate: hears normal conversation without difficulty

## 2022-07-07 NOTE — DISCHARGE NOTE BEHAVIORAL HEALTH - MEDICATION SUMMARY - MEDICATIONS TO TAKE
I will START or STAY ON the medications listed below when I get home from the hospital:    divalproex sodium 500 mg oral delayed release tablet  -- 1 tab(s) by mouth 2 times a day x 30 days. Continue until told otherwise by your provider.   -- Indication: For Mood disorder    hydrOXYzine hydrochloride 50 mg oral tablet  -- 1 tab(s) by mouth every 6 hours x 30 days, As Needed -Anxiety/insomnia. Continue until told otherwise by your provider.  -- Indication: For Anxiety/insomnia

## 2022-07-07 NOTE — DISCHARGE NOTE BEHAVIORAL HEALTH - NSBHDCSUBSTHXFT_PSY_A_CORE
Per patient alcohol, cannabis, MDMA. BAL <10. Utox was not done on admission. Last admission utox negative.

## 2022-07-07 NOTE — CHART NOTE - NSCHARTNOTEFT_GEN_A_CORE
Social Work Note:    Cesario remains on the unit for continued treatment, safety, and observation.  Treatment team met with patient on morning rounds.  He was calm and cooperative.  Patient has been in improved behavioral control.  1:1 constant observation discontinued today.  Patient has been medication compliant and denies side effects.  He has been attending and participating in unit groups.  He denies SI/HI and A/V/H.  He has     Patient expresses interest in going to inpatient rehab at discharge to address substance use.  CATCH team following.  Referrals sent to [facility] and [facility].    SW will continue to meet with patient for education, support, and assistance with planning a safe discharge.      At this time patient is not psychiatrically stable for discharge.      Mental Status Exam:    Mood – Euthymic  Sleep – Good  Appetite – Good  ADLs – Fair  Thought Process  - Linear   Observation – Routine q10.

## 2022-07-07 NOTE — DISCHARGE NOTE BEHAVIORAL HEALTH - NSBHDCCONDITIONFT_PSY_A_CORE
Compliant with medication. Does not warrant continued Inpatient hospitalization. Patient does not present an imminent risk to self or others at this time

## 2022-07-07 NOTE — DISCHARGE NOTE BEHAVIORAL HEALTH - NS MD DC FALL RISK RISK
For information on Fall & Injury Prevention, visit: [email or web address] OR  [email or web address] OR  https://www.cdc.gov/steadi/patient.html

## 2022-07-07 NOTE — PROGRESS NOTE BEHAVIORAL HEALTH - NSBHADMITIPBHPROVFT_PSY_A_CORE
unable to reach Dr. Charisma Wilkes at [facility] [phone]. Keep being transferred.

## 2022-07-07 NOTE — DISCHARGE NOTE BEHAVIORAL HEALTH - NSBHDCHOUSINGFT_PSY_A_CORE
[facility] - [address] [place] [address] [place] [id number]  [facility] - [address] [facility], [place] [address]

## 2022-07-07 NOTE — DISCHARGE NOTE BEHAVIORAL HEALTH - NSBHDCRESPONSEFT_PSY_A_CORE
Patient denied any suicidal or homicidal ideations. Patient denied any auditory or visual hallucinations. Patient is future oriented.  Patient understands and verbalized agreement with treatment plan and following up with outpatient. Psychoeducation provided regarding diagnosis, medications, treatment and follow up. Risks, benefits, alternatives discussed, all questions and concerns addressed and answered.
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
## 2022-07-07 NOTE — DISCHARGE NOTE BEHAVIORAL HEALTH - NSBHDCSIGEVENTSFT_PSY_A_CORE
At start of admission patient creating issues on the unit. Patient very flirtatious with female staff. Appears to get upset when attempts made to re-direct him
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
## 2022-07-07 NOTE — DISCHARGE NOTE BEHAVIORAL HEALTH - NSBHDCADMRISKREASONS_PSY_A_CORE
High utilizer of Inpateint/ED services/Poor engagement in outpt treatment/Non-adherence with medications/Co-occur mental health and subst use/Co-occur mental health and dev disability/Poor residential stability/Lack of social supports

## 2022-07-07 NOTE — DISCHARGE NOTE BEHAVIORAL HEALTH - MEDICATION SUMMARY - MEDICATIONS TO CHANGE
I will SWITCH the dose or number of times a day I take the medications listed below when I get home from the hospital:    divalproex sodium 500 mg oral delayed release tablet  -- 1 tab(s) by mouth 2 times a day x 14 days. Continue until told otherwise by your provider.

## 2022-07-07 NOTE — DISCHARGE NOTE BEHAVIORAL HEALTH - NSBHDCDXVALIDYESFT_PSY_A_CORE
Primary Dx Mood disorder F39. Secondary Dx 1 Cannabis use disorder, severe, dependence F12.20. Secondary Dx 2 Intermittent explosive disorder F63.81. Secondary Dx 3 Polysubstance abuse F19.10.

## 2022-07-07 NOTE — DISCHARGE NOTE BEHAVIORAL HEALTH - NSBHDCADMRISKMITFT_PSY_A_CORE
Has been medication compliant, not endorsing any side effects. Patient is future oriented. Patient verbalizes reasons for living. Patient to use positive coping skills learned during the course of the inpatient hospitalization. Patient verbalized willingness to seek care in moment of crisis. Patient is agreeable that should he have any thoughts of hurting himself or others, he will call 911, return to the ED or call the National Suicide Prevention Lifeline, immediately.

## 2022-07-07 NOTE — DISCHARGE NOTE BEHAVIORAL HEALTH - NSBHDCSWCOMMENTSFT_PSY_A_CORE
Discharge summary faxed to [facility] Discharge summary faxed to [facility] [phone] on 7/14 at 930am

## 2022-07-08 PROCEDURE — 99231 SBSQ HOSP IP/OBS SF/LOW 25: CPT

## 2022-07-08 RX ADMIN — Medication 50 MILLIGRAM(S): at 20:18

## 2022-07-08 RX ADMIN — DIVALPROEX SODIUM 500 MILLIGRAM(S): 500 TABLET, DELAYED RELEASE ORAL at 20:18

## 2022-07-08 RX ADMIN — Medication 2 MILLIGRAM(S): at 17:13

## 2022-07-08 RX ADMIN — DIVALPROEX SODIUM 500 MILLIGRAM(S): 500 TABLET, DELAYED RELEASE ORAL at 08:53

## 2022-07-08 RX ADMIN — HALOPERIDOL DECANOATE 5 MILLIGRAM(S): 100 INJECTION INTRAMUSCULAR at 17:14

## 2022-07-08 RX ADMIN — Medication 50 MILLIGRAM(S): at 17:13
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
## 2022-07-08 NOTE — PROGRESS NOTE BEHAVIORAL HEALTH - NSBHFUPINTERVALHXFT_PSY_A_CORE
Patient seen and evaluated. As per nursing report no acute events. Patient in good behavioral control yesterday. 1:1 d/c’d yesterday. On approach patient calm and cooperative. No agitation or aggression noted. In good behavioral control. Patient’s mood has improved. Denies suicidal/homicidal ideations. Denies A/V hallucinations. Patient does not present as psychotic. Denies any s/s of shawanda. Hospitalizations appear to be precipitated by acute social stressors in the context of chronic predisposing factors including cognitive limitations, limited social engagement, and trauma, and perpetuating factors include substance use. Patient is compliant with medication. Future oriented. Patient again states he wants to go to rehab. Appears optimistic about starting rehab. Patient visible on the unit engaging with peers and attending groups

## 2022-07-09 RX ADMIN — Medication 50 MILLIGRAM(S): at 20:23

## 2022-07-09 RX ADMIN — DIVALPROEX SODIUM 500 MILLIGRAM(S): 500 TABLET, DELAYED RELEASE ORAL at 08:24

## 2022-07-09 RX ADMIN — DIVALPROEX SODIUM 500 MILLIGRAM(S): 500 TABLET, DELAYED RELEASE ORAL at 20:23

## 2022-07-10 RX ADMIN — Medication 2 MILLIGRAM(S): at 10:05

## 2022-07-10 RX ADMIN — DIVALPROEX SODIUM 500 MILLIGRAM(S): 500 TABLET, DELAYED RELEASE ORAL at 08:21

## 2022-07-10 RX ADMIN — HALOPERIDOL DECANOATE 5 MILLIGRAM(S): 100 INJECTION INTRAMUSCULAR at 10:05

## 2022-07-10 RX ADMIN — DIVALPROEX SODIUM 500 MILLIGRAM(S): 500 TABLET, DELAYED RELEASE ORAL at 20:30

## 2022-07-10 RX ADMIN — Medication 50 MILLIGRAM(S): at 10:05

## 2022-07-11 PROCEDURE — 99231 SBSQ HOSP IP/OBS SF/LOW 25: CPT

## 2022-07-11 RX ORDER — DIVALPROEX SODIUM 500 MG/1
1 TABLET, DELAYED RELEASE ORAL
Qty: 60 | Refills: 0
Start: 2022-07-11 | End: 2022-08-09

## 2022-07-11 RX ORDER — HYDROXYZINE HCL 10 MG
1 TABLET ORAL
Qty: 120 | Refills: 0
Start: 2022-07-11 | End: 2022-08-09

## 2022-07-11 RX ADMIN — Medication 50 MILLIGRAM(S): at 00:52

## 2022-07-11 RX ADMIN — Medication 50 MILLIGRAM(S): at 20:15

## 2022-07-11 RX ADMIN — DIVALPROEX SODIUM 500 MILLIGRAM(S): 500 TABLET, DELAYED RELEASE ORAL at 20:15

## 2022-07-11 RX ADMIN — DIVALPROEX SODIUM 500 MILLIGRAM(S): 500 TABLET, DELAYED RELEASE ORAL at 08:26

## 2022-07-11 NOTE — PROGRESS NOTE BEHAVIORAL HEALTH - NSBHFUPINTERVALHXFT_PSY_A_CORE
Patient seen and evaluated. On approach patient calm and cooperative. No agitation or aggression noted. In good behavioral control. Patient’s mood has improved. Denies suicidal/homicidal ideations. Denies A/V hallucinations. Patient does not present as psychotic. Denies any s/s of shawanda. Hospitalizations appear to be precipitated by acute social stressors in the context of chronic predisposing factors including cognitive limitations, limited social engagement, and trauma, and perpetuating factors include substance use. Patient is compliant with medication. Future oriented. Patient again states he wants to go to rehab. Appears optimistic about starting rehab. Patient visible on the unit engaging with peers and attending groups.

## 2022-07-11 NOTE — CHART NOTE - NSCHARTNOTEFT_GEN_A_CORE
Mr. Gracia remains on the unit for continued treatment, safety and stabilization.  met with patient after the evening support group where he expressed anger regarding a peer on the unit. Mr. Gracia is currently sharing a bathroom with this peer and expressed some hygiene concerns, as this peer does not always flush the toilet and leaves food out in his room (which then emits a strong odor). Patient was angry but was able to verbalize his feelings in a calm manner.  commended patient for discussing this issue and staying in control of his emotions.  reviewed the DBT STOP Skill and its benefits, encouraging patient to focus on his own treatment and discharge plans. Patient was receptive to education and feedback.  later spoke with nursing staff and made them aware of patients concerns.      encouraged Mr. Gracia to continue to attend groups on the unit for education and support. Patient attends most groups offered and is often an active participant. Patient appears to be future oriented and hopeful regarding his plans for discharge. He reports his mood is improving and he is hoping to be accepted into a long term rehab in [place]. The benefits of inpatient rehab and dual focus follow up were reviewed. Patient did not express any additional concerns.

## 2022-07-11 NOTE — CHART NOTE - NSCHARTNOTEFT_GEN_A_CORE
Social Work Note:    Cesario remains on the unit for continued treatment, safety, and observation.  Treatment team met with patient on morning rounds.  Patient was calm and cooperative.  He endorsed good mood, sleep, and appetite.  He has been taking his medications as prescribed and denies side effects.  Patient has been in good behavioral control.  He has been attending and participating in unit groups.  He denies SI/HI and A/V/H    Patient continues to express motivation to attend inpatient rehab at discharge.  CATCH team sent referrals to [facility] and [facility].  Patient completed a phone screen with [facility] today and his acceptance is pending.        Mental Status Exam:    Mood – Euthymic  Sleep – Good  Appetite – Good  ADLs – Good  Thought Process – Linear  Observation – Routine q10.
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
## 2022-07-12 PROCEDURE — 99231 SBSQ HOSP IP/OBS SF/LOW 25: CPT

## 2022-07-12 RX ORDER — DIPHENHYDRAMINE HCL 50 MG
50 CAPSULE ORAL ONCE
Refills: 0 | Status: COMPLETED | OUTPATIENT
Start: 2022-07-12 | End: 2022-07-12

## 2022-07-12 RX ADMIN — Medication 2 MILLIGRAM(S): at 06:18

## 2022-07-12 RX ADMIN — DIVALPROEX SODIUM 500 MILLIGRAM(S): 500 TABLET, DELAYED RELEASE ORAL at 20:27

## 2022-07-12 RX ADMIN — DIVALPROEX SODIUM 500 MILLIGRAM(S): 500 TABLET, DELAYED RELEASE ORAL at 08:07

## 2022-07-12 RX ADMIN — Medication 50 MILLIGRAM(S): at 20:27

## 2022-07-12 RX ADMIN — HALOPERIDOL DECANOATE 5 MILLIGRAM(S): 100 INJECTION INTRAMUSCULAR at 06:18

## 2022-07-12 RX ADMIN — Medication 50 MILLIGRAM(S): at 22:46
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
## 2022-07-12 NOTE — PROGRESS NOTE BEHAVIORAL HEALTH - NSBHFUPINTERVALHXFT_PSY_A_CORE
Patient seen and evaluated. Patient’s mood has improved. Denies suicidal/homicidal ideations. Denies A/V hallucinations. Patient does not present as psychotic. Denies any s/s of shawanda. Patient is compliant with medication. Future oriented. Patient again states he wants to go to rehab. Appears optimistic about starting rehab. Patient visible on the unit engaging with peers and attending groups.

## 2022-07-13 PROCEDURE — 99231 SBSQ HOSP IP/OBS SF/LOW 25: CPT

## 2022-07-13 RX ORDER — DIPHENHYDRAMINE HCL 50 MG
50 CAPSULE ORAL ONCE
Refills: 0 | Status: COMPLETED | OUTPATIENT
Start: 2022-07-13 | End: 2022-07-13

## 2022-07-13 RX ORDER — HALOPERIDOL DECANOATE 100 MG/ML
5 INJECTION INTRAMUSCULAR ONCE
Refills: 0 | Status: COMPLETED | OUTPATIENT
Start: 2022-07-13 | End: 2022-07-13

## 2022-07-13 RX ADMIN — DIVALPROEX SODIUM 500 MILLIGRAM(S): 500 TABLET, DELAYED RELEASE ORAL at 20:16

## 2022-07-13 RX ADMIN — HALOPERIDOL DECANOATE 5 MILLIGRAM(S): 100 INJECTION INTRAMUSCULAR at 17:49

## 2022-07-13 RX ADMIN — Medication 50 MILLIGRAM(S): at 17:48

## 2022-07-13 RX ADMIN — Medication 2 MILLIGRAM(S): at 17:49

## 2022-07-13 RX ADMIN — DIVALPROEX SODIUM 500 MILLIGRAM(S): 500 TABLET, DELAYED RELEASE ORAL at 08:36

## 2022-07-13 NOTE — PROGRESS NOTE BEHAVIORAL HEALTH - NSBHFUPINTERVALHXFT_PSY_A_CORE
Patient seen and evaluated. Patient’s mood has improved. Denies suicidal/homicidal ideations. Denies A/V hallucinations. Patient does not present as psychotic. Denies any s/s of shawanda. Patient is compliant with medication. Future oriented. Patient appears optimistic about going to rehab. Patient expressed if not placed soon would like to be discharged. Patient visible on the unit engaging with peers and attending groups. Awaiting rehab placement.    Patient had phone screening
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
## 2022-07-14 ENCOUNTER — EMERGENCY (EMERGENCY)
Facility: HOSPITAL | Age: 27
LOS: 0 days | Discharge: HOME | End: 2022-07-15
Attending: EMERGENCY MEDICINE | Admitting: EMERGENCY MEDICINE
Payer: MEDICAID

## 2022-07-14 VITALS
DIASTOLIC BLOOD PRESSURE: 84 MMHG | SYSTOLIC BLOOD PRESSURE: 137 MMHG | RESPIRATION RATE: 18 BRPM | TEMPERATURE: 97 F | HEART RATE: 72 BPM

## 2022-07-14 VITALS
HEIGHT: 64 IN | HEART RATE: 98 BPM | WEIGHT: 169.98 LBS | SYSTOLIC BLOOD PRESSURE: 127 MMHG | RESPIRATION RATE: 18 BRPM | TEMPERATURE: 97 F | OXYGEN SATURATION: 97 % | DIASTOLIC BLOOD PRESSURE: 63 MMHG

## 2022-07-14 DIAGNOSIS — R45.851 SUICIDAL IDEATIONS: ICD-10-CM

## 2022-07-14 DIAGNOSIS — F31.9 BIPOLAR DISORDER, UNSPECIFIED: ICD-10-CM

## 2022-07-14 DIAGNOSIS — Z87.891 PERSONAL HISTORY OF NICOTINE DEPENDENCE: ICD-10-CM

## 2022-07-14 DIAGNOSIS — F43.10 POST-TRAUMATIC STRESS DISORDER, UNSPECIFIED: ICD-10-CM

## 2022-07-14 DIAGNOSIS — F43.24 ADJUSTMENT DISORDER WITH DISTURBANCE OF CONDUCT: ICD-10-CM

## 2022-07-14 DIAGNOSIS — F19.90 OTHER PSYCHOACTIVE SUBSTANCE USE, UNSPECIFIED, UNCOMPLICATED: ICD-10-CM

## 2022-07-14 DIAGNOSIS — Z88.2 ALLERGY STATUS TO SULFONAMIDES: ICD-10-CM

## 2022-07-14 DIAGNOSIS — F84.0 AUTISTIC DISORDER: ICD-10-CM

## 2022-07-14 DIAGNOSIS — Z76.5 MALINGERER [CONSCIOUS SIMULATION]: ICD-10-CM

## 2022-07-14 PROCEDURE — 99285 EMERGENCY DEPT VISIT HI MDM: CPT

## 2022-07-14 PROCEDURE — 99238 HOSP IP/OBS DSCHRG MGMT 30/<: CPT

## 2022-07-14 RX ADMIN — DIVALPROEX SODIUM 500 MILLIGRAM(S): 500 TABLET, DELAYED RELEASE ORAL at 08:06

## 2022-07-14 RX ADMIN — HALOPERIDOL DECANOATE 5 MILLIGRAM(S): 100 INJECTION INTRAMUSCULAR at 08:32

## 2022-07-14 RX ADMIN — Medication 2 MILLIGRAM(S): at 08:32

## 2022-07-14 NOTE — PROGRESS NOTE BEHAVIORAL HEALTH - NSBHFUPINTERVALCCFT_PSY_A_CORE
"Im sorry"
"None"
"Feel like I got nothing to live for"
"Im ready to go"
"None"
"I feel good"
"I don't want to take the medications."
"None"

## 2022-07-14 NOTE — PROGRESS NOTE BEHAVIORAL HEALTH - SECONDARY DX1
Cannabis use disorder, severe, dependence
Cannabis use disorder, severe, dependence
Amphetamine use disorder, severe, dependence
Cannabis use disorder, severe, dependence
Amphetamine use disorder, severe, dependence

## 2022-07-14 NOTE — PROGRESS NOTE BEHAVIORAL HEALTH - PRIMARY DX
Mood disorder
Bipolar disorder, current episode depressed, severe, without psychotic features
Mood disorder
Bipolar disorder, current episode depressed, severe, without psychotic features

## 2022-07-14 NOTE — PROGRESS NOTE BEHAVIORAL HEALTH - AXIS III
Heart Murmur
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
## 2022-07-14 NOTE — PROGRESS NOTE BEHAVIORAL HEALTH - NSBHCHARTREVIEWINVESTIGATE_PSY_A_CORE FT
DISPLAY PLAN FREE TEXT
< from: 12 Lead ECG (07.02.22 @ 01:54) >    Ventricular Rate 60 BPM    Atrial Rate 60 BPM    P-R Interval 150 ms    QRS Duration 86 ms    Q-T Interval 374 ms    QTC Calculation(Bazett) 374 ms    P Axis 65 degrees    R Axis 25 degrees    T Axis 49 degrees    Diagnosis Line Normal sinus rhythm  Normal ECG    < end of copied text >

## 2022-07-14 NOTE — PROGRESS NOTE BEHAVIORAL HEALTH - NSBHATTESTTYPEVISIT_PSY_A_CORE
Attending with Resident/Fellow/Student
ORLY without on-site Attending supervision
ORLY without on-site Attending supervision
Attending with Resident/Fellow/Student
ORLY without on-site Attending supervision

## 2022-07-14 NOTE — PROGRESS NOTE BEHAVIORAL HEALTH - NSBHCHARTREVIEWVS_PSY_A_CORE FT
Vital Signs Last 24 Hrs  T(C): 35.4 (12 Jul 2022 09:32), Max: 36.8 (11 Jul 2022 16:22)  T(F): 95.7 (12 Jul 2022 09:32), Max: 98.3 (11 Jul 2022 16:22)  HR: 86 (12 Jul 2022 09:32) (78 - 86)  BP: 106/78 (12 Jul 2022 09:32) (106/78 - 139/89)  BP(mean): --  RR: 18 (12 Jul 2022 09:32) (18 - 18)  SpO2: --
Vital Signs Last 24 Hrs  T(C): 36.9 (13 Jul 2022 16:24), Max: 37.2 (13 Jul 2022 11:33)  T(F): 98.4 (13 Jul 2022 16:24), Max: 98.9 (13 Jul 2022 11:33)  HR: 86 (13 Jul 2022 16:24) (86 - 107)  BP: 150/82 (13 Jul 2022 16:24) (146/60 - 150/82)  BP(mean): --  RR: 19 (13 Jul 2022 16:24) (18 - 19)  SpO2: --
Vital Signs Last 24 Hrs  T(C): 36.6 (11 Jul 2022 05:59), Max: 36.6 (11 Jul 2022 05:59)  T(F): 97.9 (11 Jul 2022 05:59), Max: 97.9 (11 Jul 2022 05:59)  HR: 64 (11 Jul 2022 05:59) (64 - 82)  BP: 115/54 (11 Jul 2022 05:59) (115/54 - 123/57)  BP(mean): --  RR: 18 (11 Jul 2022 05:59) (18 - 18)  SpO2: --
Vital Signs Last 24 Hrs  T(C): 36.6 (07 Jul 2022 08:32), Max: 36.6 (07 Jul 2022 08:32)  T(F): 97.9 (07 Jul 2022 08:32), Max: 97.9 (07 Jul 2022 08:32)  HR: 65 (07 Jul 2022 08:32) (60 - 65)  BP: 100/49 (07 Jul 2022 08:32) (90/50 - 100/49)  BP(mean): --  RR: 18 (07 Jul 2022 08:32) (18 - 18)  SpO2: --
Vital Signs Last 24 Hrs  T(C): 36.7 (13 Jul 2022 06:41), Max: 36.7 (13 Jul 2022 06:41)  T(F): 98 (13 Jul 2022 06:41), Max: 98 (13 Jul 2022 06:41)  HR: 65 (13 Jul 2022 06:41) (65 - 67)  BP: 118/61 (13 Jul 2022 06:41) (99/55 - 118/61)  BP(mean): --  RR: 18 (13 Jul 2022 06:41) (16 - 18)  SpO2: --
Vital Signs Last 24 Hrs  T(C): 36.7 (05 Jul 2022 07:57), Max: 36.7 (05 Jul 2022 07:57)  T(F): 98 (05 Jul 2022 07:57), Max: 98 (05 Jul 2022 07:57)  HR: 56 (05 Jul 2022 07:57) (56 - 58)  BP: 119/61 (05 Jul 2022 07:57) (110/57 - 119/61)  BP(mean): --  RR: 16 (05 Jul 2022 07:57) (16 - 18)  SpO2: --
Vital Signs Last 24 Hrs  T(C): 36.7 (05 Jul 2022 07:57), Max: 36.7 (05 Jul 2022 07:57)  T(F): 98 (05 Jul 2022 07:57), Max: 98 (05 Jul 2022 07:57)  HR: 56 (05 Jul 2022 07:57) (56 - 58)  BP: 119/61 (05 Jul 2022 07:57) (110/57 - 119/61)  BP(mean): --  RR: 16 (05 Jul 2022 07:57) (16 - 18)  SpO2: --
Vital Signs Last 24 Hrs  T(C): 36.4 (04 Jul 2022 08:04), Max: 36.4 (03 Jul 2022 15:57)  T(F): 97.6 (04 Jul 2022 08:04), Max: 97.6 (03 Jul 2022 15:57)  HR: 55 (04 Jul 2022 08:04) (55 - 59)  BP: 115/58 (04 Jul 2022 08:04) (93/55 - 131/58)  BP(mean): --  RR: 16 (04 Jul 2022 08:04) (16 - 18)  SpO2: --
Vital Signs Last 24 Hrs  T(C): 36.7 (03 Jul 2022 08:18), Max: 36.7 (03 Jul 2022 08:18)  T(F): 98.1 (03 Jul 2022 08:18), Max: 98.1 (03 Jul 2022 08:18)  HR: 55 (03 Jul 2022 08:18) (51 - 60)  BP: 111/68 (03 Jul 2022 08:18) (95/54 - 111/68)  BP(mean): --  RR: 18 (03 Jul 2022 08:18) (18 - 18)  SpO2: --
Vital Signs Last 24 Hrs  T(C): 36.4 (08 Jul 2022 08:56), Max: 36.8 (08 Jul 2022 06:23)  T(F): 97.6 (08 Jul 2022 08:56), Max: 98.3 (08 Jul 2022 06:23)  HR: 77 (08 Jul 2022 08:56) (60 - 77)  BP: 122/79 (08 Jul 2022 08:56) (97/52 - 122/79)  BP(mean): --  RR: 18 (08 Jul 2022 08:56) (16 - 18)  SpO2: --

## 2022-07-14 NOTE — ED ADULT TRIAGE NOTE - CHIEF COMPLAINT QUOTE
Pt has suicidal ideation on overdosing himself. Pt lives in [place] in bad conditions, cannot talke it any longer, wants to be evaluated. 1:1 initiated

## 2022-07-14 NOTE — PROGRESS NOTE BEHAVIORAL HEALTH - SECONDARY DX3
Polysubstance abuse
PTSD (post-traumatic stress disorder)
Polysubstance abuse
PTSD (post-traumatic stress disorder)

## 2022-07-14 NOTE — CHART NOTE - NSCHARTNOTESELECT_GEN_ALL_CORE
Event Note
Event Note
attending note/Event Note
Event Note
Social Work Note/Event Note
attending note/Event Note
discharge/Event Note

## 2022-07-14 NOTE — PROGRESS NOTE BEHAVIORAL HEALTH - IMPULSE CONTROL
Normal
PROVIDER:[[id number]]]

## 2022-07-14 NOTE — CHART NOTE - NSCHARTNOTEFT_GEN_A_CORE
Social Work Discharge Note:    Patient is for discharge today.  He is alert and oriented x3.  Mood has improved.  Anxiety has decreased.  Insight and judgment have improved.  Suicidal/homicidal ideation denied.    Patient will be discharged to his [facility] of [facility]  - [facility] – [address] [place] [address]    He has been referred to the [facility] for outpatient [facility] treatment.      [facility] [phone] provided as an additional resource.    Patient confirms his phone # is [phone]    Patient is aware and agreeable to discharge today

## 2022-07-14 NOTE — PROGRESS NOTE BEHAVIORAL HEALTH - NSBHFUPINTERVALHXFT_PSY_A_CORE
Patient seen and evaluated. As per nursing report patient creating issues on the unit last night. Went after staff requiring Haldol, Ativan, Benadryl IM. On approach patient irritable demanding discharge. Upset about the time it’s taking to be accepted to Rehab. Denies suicidal/homicidal ideations. Denies A/V hallucinations. Patient does not present as psychotic. Denies any s/s of shawanda. Hospitalizations continue to appear to be precipitated by acute social stressors in the context of chronic predisposing factors including cognitive limitations, limited social engagement, and trauma, and perpetuating factors include substance use. All issues on the unit appear to be behavioral. Patient has chronic issues with impulsivity. Appears to have antisocial personality traits. He has difficulty regulating his emotions which is chronic. Repeated hospitalizations do not appear to be beneficial. Patient is compliant with medication. Future oriented. Does not warrant continued Inpatient hospitalization. Patient does not present an imminent risk to self or others at this time. Patient to be discharged today.

## 2022-07-14 NOTE — PROGRESS NOTE BEHAVIORAL HEALTH - SECONDARY DX2
Intermittent explosive disorder
Alcohol use disorder, severe, dependence
Alcohol use disorder, severe, dependence
Intermittent explosive disorder

## 2022-07-14 NOTE — ED ADULT TRIAGE NOTE - NS_BH TRG QUESTION8_ED_ALL_ED
Depression (without Suicidality or Psychosis)/Ct (includes Bipolar Disorder)/Anxiety (includes Panic, OCD)

## 2022-07-14 NOTE — PROGRESS NOTE BEHAVIORAL HEALTH - NSBHPTASSESSDT_PSY_A_CORE
14-Jul-2022 08:19
08-Jul-2022 11:28
03-Jul-2022 11:14
07-Jul-2022 11:55
11-Jul-2022 12:01
12-Jul-2022 12:55
05-Jul-2022 08:20
13-Jul-2022 10:43
06-Jul-2022 14:10
04-Jul-2022 11:10

## 2022-07-14 NOTE — PROGRESS NOTE BEHAVIORAL HEALTH - NSBHCHARTREVIEWLAB_PSY_A_CORE FT
Complete Blood Count + Automated Diff (07.03.22 @ 20:19)   WBC Count: 8.65 K/uL   RBC Count: 5.25 M/uL   Hemoglobin: 14.0 g/dL   Hematocrit: 44.4   Mean Cell Volume: 84.6 fL   Mean Cell Hemoglobin: 26.7 pg   Mean Cell Hemoglobin Conc: 31.5 g/dL   Red Cell Distrib Width: 13.2    Platelet Count - Automated: 250 K/uL   Auto Neutrophil #: 5.23 K/uL   Auto Lymphocyte #: 2.46 K/uL   Auto Monocyte #: 0.55 K/uL   Auto Eosinophil #: 0.33 K/uL   Auto Basophil #: 0.06 K/uL   Auto Neutrophil %: 60.5:  Auto Lymphocyte %: 28.4    Auto Monocyte %: 6.4   Auto Eosinophil %: 3.8    Auto Basophil %: 0.7    Auto Immature Granulocyte %: 0.2: (Includes meta, myelo and promyelocytes)    Nucleated RBC: 0 /100 WBCs
Lipid Profile in AM (07.04.22 @ 08:24)   Cholesterol, Serum: 207 mg/dL   Triglycerides, Serum: 167 mg/dL   HDL Cholesterol, Serum: 41 mg/dL   Non HDL Cholesterol: 166
Complete Blood Count + Automated Diff (07.03.22 @ 20:19)   WBC Count: 8.65 K/uL   RBC Count: 5.25 M/uL   Hemoglobin: 14.0 g/dL   Hematocrit: 44.4   Mean Cell Volume: 84.6 fL   Mean Cell Hemoglobin: 26.7 pg   Mean Cell Hemoglobin Conc: 31.5 g/dL   Red Cell Distrib Width: 13.2    Platelet Count - Automated: 250 K/uL   Auto Neutrophil #: 5.23 K/uL   Auto Lymphocyte #: 2.46 K/uL   Auto Monocyte #: 0.55 K/uL   Auto Eosinophil #: 0.33 K/uL   Auto Basophil #: 0.06 K/uL   Auto Neutrophil %: 60.5:  Auto Lymphocyte %: 28.4    Auto Monocyte %: 6.4   Auto Eosinophil %: 3.8    Auto Basophil %: 0.7    Auto Immature Granulocyte %: 0.2: (Includes meta, myelo and promyelocytes)    Nucleated RBC: 0 /100 WBCs
13.9   6.36  )-----------( 247      ( 02 Jul 2022 01:30 )             41.8   07-02    140  |  105  |  16  ----------------------------<  103<H>  4.5   |  24  |  0.9    Ca    9.9      02 Jul 2022 01:30    TPro  7.1  /  Alb  4.8  /  TBili  <0.2  /  DBili  x   /  AST  21  /  ALT  13  /  AlkPhos  67  07-02    Valproic Acid Level, Serum: <3.0 ug/mL (07.02.22 @ 01:30)

## 2022-07-14 NOTE — PROGRESS NOTE BEHAVIORAL HEALTH - AFFECT CONGRUENCE
Congruent
24
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
## 2022-07-14 NOTE — PROGRESS NOTE BEHAVIORAL HEALTH - NSBHADMITDANGERSELF_PSY_A_CORE
unable to care for self
suicidal ideation with plan and means
unable to care for self
suicidal ideation with plan and means

## 2022-07-14 NOTE — PROGRESS NOTE BEHAVIORAL HEALTH - NSBHFUPTYPE_PSY_A_CORE
Inpatient
Inpatient
Inpatient-On Service Note
Inpatient
Inpatient-On Service Note
Inpatient
Inpatient

## 2022-07-15 VITALS
HEART RATE: 72 BPM | TEMPERATURE: 97 F | OXYGEN SATURATION: 98 % | DIASTOLIC BLOOD PRESSURE: 72 MMHG | RESPIRATION RATE: 18 BRPM | SYSTOLIC BLOOD PRESSURE: 124 MMHG

## 2022-07-15 DIAGNOSIS — F43.24 ADJUSTMENT DISORDER WITH DISTURBANCE OF CONDUCT: ICD-10-CM

## 2022-07-15 DIAGNOSIS — Z76.5 MALINGERER [CONSCIOUS SIMULATION]: ICD-10-CM

## 2022-07-15 DIAGNOSIS — Z02.9 ENCOUNTER FOR ADMINISTRATIVE EXAMINATIONS, UNSPECIFIED: ICD-10-CM

## 2022-07-15 DIAGNOSIS — F84.0 AUTISTIC DISORDER: ICD-10-CM

## 2022-07-15 LAB
AMPHET UR-MCNC: NEGATIVE — SIGNIFICANT CHANGE UP
ANION GAP SERPL CALC-SCNC: 14 MMOL/L — SIGNIFICANT CHANGE UP (ref 7–14)
APAP SERPL-MCNC: <5 UG/ML — LOW (ref 10–30)
APPEARANCE UR: CLEAR — SIGNIFICANT CHANGE UP
BARBITURATES UR SCN-MCNC: NEGATIVE — SIGNIFICANT CHANGE UP
BASOPHILS # BLD AUTO: 0.04 K/UL — SIGNIFICANT CHANGE UP (ref 0–0.2)
BASOPHILS NFR BLD AUTO: 0.6 % — SIGNIFICANT CHANGE UP (ref 0–1)
BENZODIAZ UR-MCNC: NEGATIVE — SIGNIFICANT CHANGE UP
BILIRUB UR-MCNC: NEGATIVE — SIGNIFICANT CHANGE UP
BUN SERPL-MCNC: 17 MG/DL — SIGNIFICANT CHANGE UP (ref 10–20)
CALCIUM SERPL-MCNC: 9.7 MG/DL — SIGNIFICANT CHANGE UP (ref 8.5–10.1)
CHLORIDE SERPL-SCNC: 101 MMOL/L — SIGNIFICANT CHANGE UP (ref 98–110)
CO2 SERPL-SCNC: 25 MMOL/L — SIGNIFICANT CHANGE UP (ref 17–32)
COCAINE METAB.OTHER UR-MCNC: NEGATIVE — SIGNIFICANT CHANGE UP
COLOR SPEC: SIGNIFICANT CHANGE UP
CREAT SERPL-MCNC: 0.8 MG/DL — SIGNIFICANT CHANGE UP (ref 0.7–1.5)
DIFF PNL FLD: NEGATIVE — SIGNIFICANT CHANGE UP
DRUG SCREEN 1, URINE RESULT: SIGNIFICANT CHANGE UP
EGFR: 125 ML/MIN/1.73M2 — SIGNIFICANT CHANGE UP
EOSINOPHIL # BLD AUTO: 0.18 K/UL — SIGNIFICANT CHANGE UP (ref 0–0.7)
EOSINOPHIL NFR BLD AUTO: 2.5 % — SIGNIFICANT CHANGE UP (ref 0–8)
ETHANOL SERPL-MCNC: <10 MG/DL — SIGNIFICANT CHANGE UP
FENTANYL UR QL: NEGATIVE — SIGNIFICANT CHANGE UP
GLUCOSE SERPL-MCNC: 95 MG/DL — SIGNIFICANT CHANGE UP (ref 70–99)
GLUCOSE UR QL: NEGATIVE — SIGNIFICANT CHANGE UP
HCT VFR BLD CALC: 42.2 % — SIGNIFICANT CHANGE UP (ref 42–52)
HGB BLD-MCNC: 14.2 G/DL — SIGNIFICANT CHANGE UP (ref 14–18)
IMM GRANULOCYTES NFR BLD AUTO: 1 % — HIGH (ref 0.1–0.3)
KETONES UR-MCNC: SIGNIFICANT CHANGE UP
LEUKOCYTE ESTERASE UR-ACNC: NEGATIVE — SIGNIFICANT CHANGE UP
LYMPHOCYTES # BLD AUTO: 2.29 K/UL — SIGNIFICANT CHANGE UP (ref 1.2–3.4)
LYMPHOCYTES # BLD AUTO: 32.1 % — SIGNIFICANT CHANGE UP (ref 20.5–51.1)
MCHC RBC-ENTMCNC: 28 PG — SIGNIFICANT CHANGE UP (ref 27–31)
MCHC RBC-ENTMCNC: 33.6 G/DL — SIGNIFICANT CHANGE UP (ref 32–37)
MCV RBC AUTO: 83.2 FL — SIGNIFICANT CHANGE UP (ref 80–94)
METHADONE UR-MCNC: NEGATIVE — SIGNIFICANT CHANGE UP
MONOCYTES # BLD AUTO: 0.68 K/UL — HIGH (ref 0.1–0.6)
MONOCYTES NFR BLD AUTO: 9.5 % — HIGH (ref 1.7–9.3)
NEUTROPHILS # BLD AUTO: 3.88 K/UL — SIGNIFICANT CHANGE UP (ref 1.4–6.5)
NEUTROPHILS NFR BLD AUTO: 54.3 % — SIGNIFICANT CHANGE UP (ref 42.2–75.2)
NITRITE UR-MCNC: NEGATIVE — SIGNIFICANT CHANGE UP
NRBC # BLD: 0 /100 WBCS — SIGNIFICANT CHANGE UP (ref 0–0)
OPIATES UR-MCNC: NEGATIVE — SIGNIFICANT CHANGE UP
OXYCODONE UR-MCNC: NEGATIVE — SIGNIFICANT CHANGE UP
PCP UR-MCNC: NEGATIVE — SIGNIFICANT CHANGE UP
PH UR: 7 — SIGNIFICANT CHANGE UP (ref 5–8)
PLATELET # BLD AUTO: 208 K/UL — SIGNIFICANT CHANGE UP (ref 130–400)
POTASSIUM SERPL-MCNC: 4.5 MMOL/L — SIGNIFICANT CHANGE UP (ref 3.5–5)
POTASSIUM SERPL-SCNC: 4.5 MMOL/L — SIGNIFICANT CHANGE UP (ref 3.5–5)
PROPOXYPHENE QUALITATIVE URINE RESULT: NEGATIVE — SIGNIFICANT CHANGE UP
PROT UR-MCNC: SIGNIFICANT CHANGE UP
RBC # BLD: 5.07 M/UL — SIGNIFICANT CHANGE UP (ref 4.7–6.1)
RBC # FLD: 13.6 % — SIGNIFICANT CHANGE UP (ref 11.5–14.5)
SALICYLATES SERPL-MCNC: <0.3 MG/DL — LOW (ref 4–30)
SARS-COV-2 RNA SPEC QL NAA+PROBE: SIGNIFICANT CHANGE UP
SODIUM SERPL-SCNC: 140 MMOL/L — SIGNIFICANT CHANGE UP (ref 135–146)
SP GR SPEC: 1.03 — SIGNIFICANT CHANGE UP (ref 1.01–1.03)
THC UR QL: NEGATIVE — SIGNIFICANT CHANGE UP
UROBILINOGEN FLD QL: SIGNIFICANT CHANGE UP
WBC # BLD: 7.14 K/UL — SIGNIFICANT CHANGE UP (ref 4.8–10.8)
WBC # FLD AUTO: 7.14 K/UL — SIGNIFICANT CHANGE UP (ref 4.8–10.8)

## 2022-07-15 PROCEDURE — 93010 ELECTROCARDIOGRAM REPORT: CPT

## 2022-07-15 PROCEDURE — 90792 PSYCH DIAG EVAL W/MED SRVCS: CPT | Mod: 95

## 2022-07-15 NOTE — ED PROVIDER NOTE - OBJECTIVE STATEMENT
Pt is a 25 y/o male with PMH of substance use disorder (cocaine, alcohol, marijuana, MDMA), PTSD and bipolar disorder presenting for suicidal ideation. Pt says he feels suicidal because he lives in a small apartment in [place] with his uncle and there are roaches there and it's on the 14th floor so it is very hot in the summer. Says he thinks about overdosing and dying. Also endorses homicidal ideation and thinks about punching people on the street. No A/V hallucinations. Pt was discharged this morning from inpatient psychiatry. According to discharge papers, pt was supposed to go to [facility] in [place] but pt says he went to his apartment in [place] instead.  Says he has not used any recreational drugs in weeks to months.
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
## 2022-07-15 NOTE — ED PROVIDER NOTE - CLINICAL SUMMARY MEDICAL DECISION MAKING FREE TEXT BOX
Received pt from Dr Finch awaiting psych clearance. He is well known to [facility] for frequent visits, some requiring psychiatric hospitalization (he was just discharged yesterday). He is currently undomiciled and was uncomfortable in the environment he was discharged to, so came back. Cleared by psych; was seen by SW who helped arrange family connection in [place]. He will be given a metrocard, plan is for his grandmother to wire him money so he can join her in [place]. He spoke with her on the phone and she is in agreement. Will d/c.
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
## 2022-07-15 NOTE — ED PROVIDER NOTE - PROGRESS NOTE DETAILS
PS: Will consult telepsych [facility]: pt was cleared by psychiatry.  spoke to social work regarding pt's home situation. pt resting comfortably in chair, eating meal.
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
## 2022-07-15 NOTE — ED BEHAVIORAL HEALTH ASSESSMENT NOTE - DESCRIPTION
ED course and collateral attempts are as per BT[facility] (ED Behavioral health) note per chart; homeless, recently fired from his job as a  as per HPI

## 2022-07-15 NOTE — ED BEHAVIORAL HEALTH ASSESSMENT NOTE - OTHER
Records checked– with data: [facility], [facility], [facility]. Records checked- no data: [facility] Psychiatry, [facility] ED Visits, [facility] Outpatient Medical, [facility] Outpatient BH, [facility], [facility]iat[facility], [facility] Outpatient Psychiat[facility],  [facility],  [facility] Psychiatry, [facility], [facility], [facility], [facility] In[facility], [facility], [facility], Scan ER, nysdoccslookup, Webcrims, Google Search. chronically limited helpless dysthymic not observed disingenuous content including suicidality w/ inconsistent history provision and most notable for spontaneous help seek housing related content concrete [place] [facility] ([address] [facility])

## 2022-07-15 NOTE — ED ADULT NURSE NOTE - CHIEF COMPLAINT QUOTE
Pt has suicidal ideation on overdosing himself. Pt lives in [place] in bad conditions, cannot talke it any longer, wants to be evaluated. 1:1 initiated

## 2022-07-15 NOTE — ED BEHAVIORAL HEALTH ASSESSMENT NOTE - NSSUICPROTFACT_PSY_ALL_CORE
help seeking and self preserving/Identifies reasons for living/Fear of death or the actual act of killing self

## 2022-07-15 NOTE — ED PROVIDER NOTE - PATIENT PORTAL LINK FT
You can access the FollowMyHealth Patient Portal offered by [facility] by registering at the following website: [email or web address]. By joining [employer]’s FollowMyHealth portal, you will also be able to view your health information using other applications (apps) compatible with our system.
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
## 2022-07-15 NOTE — ED PROVIDER NOTE - PHYSICAL EXAMINATION
CONST: well appearing for age  HEAD:  normocephalic, atraumatic  EYES:  conjunctivae without injection, drainage or discharge  ENMT:  nasal mucosa moist; mouth moist without ulcerations or lesions; throat moist without erythema, exudate, ulcerations or lesions  NECK:  supple  CARDIAC:  regular rate and rhythm, normal S1 and S2, no murmurs, rubs or gallops  RESP:  respiratory rate and effort appear normal for age; lungs are clear to auscultation bilaterally; no rales or wheezes  ABDOMEN:  soft, nontender, nondistended  MUSCULOSKELETAL/NEURO:  normal movement, normal tone  SKIN:  normal skin color for age and race, well-perfused; warm and dry

## 2022-07-15 NOTE — ED PROVIDER NOTE - NSFOLLOWUPINSTRUCTIONS_ED_ALL_ED_FT
1. Follow up in outpt mental health.  2. Avoid triggers/substance use  3. Return to the ED for persistent or worsening symptoms.

## 2022-07-16 DIAGNOSIS — Z02.9 ENCOUNTER FOR ADMINISTRATIVE EXAMINATIONS, UNSPECIFIED: ICD-10-CM

## 2022-07-18 ENCOUNTER — EMERGENCY (EMERGENCY)
Facility: HOSPITAL | Age: 27
LOS: 0 days | Discharge: HOME | End: 2022-07-19
Attending: STUDENT IN AN ORGANIZED HEALTH CARE EDUCATION/TRAINING PROGRAM | Admitting: STUDENT IN AN ORGANIZED HEALTH CARE EDUCATION/TRAINING PROGRAM

## 2022-07-18 VITALS
OXYGEN SATURATION: 97 % | DIASTOLIC BLOOD PRESSURE: 66 MMHG | HEART RATE: 71 BPM | HEIGHT: 64 IN | TEMPERATURE: 96 F | RESPIRATION RATE: 18 BRPM | SYSTOLIC BLOOD PRESSURE: 110 MMHG

## 2022-07-18 DIAGNOSIS — F12.90 CANNABIS USE, UNSPECIFIED, UNCOMPLICATED: ICD-10-CM

## 2022-07-18 DIAGNOSIS — F43.20 ADJUSTMENT DISORDER, UNSPECIFIED: ICD-10-CM

## 2022-07-18 DIAGNOSIS — Z88.1 ALLERGY STATUS TO OTHER ANTIBIOTIC AGENTS STATUS: ICD-10-CM

## 2022-07-18 DIAGNOSIS — Q21.1 ATRIAL SEPTAL DEFECT: ICD-10-CM

## 2022-07-18 DIAGNOSIS — F19.90 OTHER PSYCHOACTIVE SUBSTANCE USE, UNSPECIFIED, UNCOMPLICATED: ICD-10-CM

## 2022-07-18 DIAGNOSIS — R01.1 CARDIAC MURMUR, UNSPECIFIED: ICD-10-CM

## 2022-07-18 DIAGNOSIS — F15.10 OTHER STIMULANT ABUSE, UNCOMPLICATED: ICD-10-CM

## 2022-07-18 DIAGNOSIS — Z87.891 PERSONAL HISTORY OF NICOTINE DEPENDENCE: ICD-10-CM

## 2022-07-18 DIAGNOSIS — F10.10 ALCOHOL ABUSE, UNCOMPLICATED: ICD-10-CM

## 2022-07-18 DIAGNOSIS — R45.851 SUICIDAL IDEATIONS: ICD-10-CM

## 2022-07-18 DIAGNOSIS — R45.850 HOMICIDAL IDEATIONS: ICD-10-CM

## 2022-07-18 DIAGNOSIS — F43.10 POST-TRAUMATIC STRESS DISORDER, UNSPECIFIED: ICD-10-CM

## 2022-07-18 DIAGNOSIS — Z20.822 CONTACT WITH AND (SUSPECTED) EXPOSURE TO COVID-19: ICD-10-CM

## 2022-07-18 DIAGNOSIS — F41.9 ANXIETY DISORDER, UNSPECIFIED: ICD-10-CM

## 2022-07-18 PROCEDURE — 93010 ELECTROCARDIOGRAM REPORT: CPT

## 2022-07-18 PROCEDURE — 99285 EMERGENCY DEPT VISIT HI MDM: CPT
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
## 2022-07-19 VITALS
RESPIRATION RATE: 18 BRPM | OXYGEN SATURATION: 98 % | HEART RATE: 72 BPM | SYSTOLIC BLOOD PRESSURE: 114 MMHG | DIASTOLIC BLOOD PRESSURE: 64 MMHG

## 2022-07-19 DIAGNOSIS — F19.90 OTHER PSYCHOACTIVE SUBSTANCE USE, UNSPECIFIED, UNCOMPLICATED: ICD-10-CM

## 2022-07-19 DIAGNOSIS — F43.20 ADJUSTMENT DISORDER, UNSPECIFIED: ICD-10-CM

## 2022-07-19 LAB
ALBUMIN SERPL ELPH-MCNC: 4.5 G/DL — SIGNIFICANT CHANGE UP (ref 3.5–5.2)
ALP SERPL-CCNC: 64 U/L — SIGNIFICANT CHANGE UP (ref 30–115)
ALT FLD-CCNC: 26 U/L — SIGNIFICANT CHANGE UP (ref 0–41)
ANION GAP SERPL CALC-SCNC: 13 MMOL/L — SIGNIFICANT CHANGE UP (ref 7–14)
APAP SERPL-MCNC: <5 UG/ML — LOW (ref 10–30)
APPEARANCE UR: CLEAR — SIGNIFICANT CHANGE UP
AST SERPL-CCNC: 36 U/L — SIGNIFICANT CHANGE UP (ref 0–41)
BACTERIA # UR AUTO: NEGATIVE — SIGNIFICANT CHANGE UP
BASOPHILS # BLD AUTO: 0.02 K/UL — SIGNIFICANT CHANGE UP (ref 0–0.2)
BASOPHILS NFR BLD AUTO: 0.3 % — SIGNIFICANT CHANGE UP (ref 0–1)
BILIRUB DIRECT SERPL-MCNC: <0.2 MG/DL — SIGNIFICANT CHANGE UP (ref 0–0.3)
BILIRUB INDIRECT FLD-MCNC: >0.1 MG/DL — LOW (ref 0.2–1.2)
BILIRUB SERPL-MCNC: 0.3 MG/DL — SIGNIFICANT CHANGE UP (ref 0.2–1.2)
BILIRUB UR-MCNC: NEGATIVE — SIGNIFICANT CHANGE UP
BUN SERPL-MCNC: 18 MG/DL — SIGNIFICANT CHANGE UP (ref 10–20)
CALCIUM SERPL-MCNC: 9.2 MG/DL — SIGNIFICANT CHANGE UP (ref 8.5–10.1)
CHLORIDE SERPL-SCNC: 110 MMOL/L — SIGNIFICANT CHANGE UP (ref 98–110)
CO2 SERPL-SCNC: 24 MMOL/L — SIGNIFICANT CHANGE UP (ref 17–32)
COLOR SPEC: YELLOW — SIGNIFICANT CHANGE UP
CREAT SERPL-MCNC: 0.9 MG/DL — SIGNIFICANT CHANGE UP (ref 0.7–1.5)
DIFF PNL FLD: NEGATIVE — SIGNIFICANT CHANGE UP
EGFR: 121 ML/MIN/1.73M2 — SIGNIFICANT CHANGE UP
EOSINOPHIL # BLD AUTO: 0.33 K/UL — SIGNIFICANT CHANGE UP (ref 0–0.7)
EOSINOPHIL NFR BLD AUTO: 4.1 % — SIGNIFICANT CHANGE UP (ref 0–8)
EPI CELLS # UR: 4 /HPF — SIGNIFICANT CHANGE UP (ref 0–5)
ETHANOL SERPL-MCNC: <10 MG/DL — SIGNIFICANT CHANGE UP
GLUCOSE SERPL-MCNC: 99 MG/DL — SIGNIFICANT CHANGE UP (ref 70–99)
GLUCOSE UR QL: NEGATIVE — SIGNIFICANT CHANGE UP
HCT VFR BLD CALC: 40.7 % — LOW (ref 42–52)
HGB BLD-MCNC: 13.4 G/DL — LOW (ref 14–18)
HYALINE CASTS # UR AUTO: 0 /LPF — SIGNIFICANT CHANGE UP (ref 0–7)
IMM GRANULOCYTES NFR BLD AUTO: 0.6 % — HIGH (ref 0.1–0.3)
KETONES UR-MCNC: SIGNIFICANT CHANGE UP
LEUKOCYTE ESTERASE UR-ACNC: NEGATIVE — SIGNIFICANT CHANGE UP
LYMPHOCYTES # BLD AUTO: 1.88 K/UL — SIGNIFICANT CHANGE UP (ref 1.2–3.4)
LYMPHOCYTES # BLD AUTO: 23.5 % — SIGNIFICANT CHANGE UP (ref 20.5–51.1)
MCHC RBC-ENTMCNC: 27.3 PG — SIGNIFICANT CHANGE UP (ref 27–31)
MCHC RBC-ENTMCNC: 32.9 G/DL — SIGNIFICANT CHANGE UP (ref 32–37)
MCV RBC AUTO: 83.1 FL — SIGNIFICANT CHANGE UP (ref 80–94)
MONOCYTES # BLD AUTO: 0.81 K/UL — HIGH (ref 0.1–0.6)
MONOCYTES NFR BLD AUTO: 10.1 % — HIGH (ref 1.7–9.3)
NEUTROPHILS # BLD AUTO: 4.9 K/UL — SIGNIFICANT CHANGE UP (ref 1.4–6.5)
NEUTROPHILS NFR BLD AUTO: 61.4 % — SIGNIFICANT CHANGE UP (ref 42.2–75.2)
NITRITE UR-MCNC: NEGATIVE — SIGNIFICANT CHANGE UP
NRBC # BLD: 0 /100 WBCS — SIGNIFICANT CHANGE UP (ref 0–0)
PH UR: 6 — SIGNIFICANT CHANGE UP (ref 5–8)
PLATELET # BLD AUTO: 276 K/UL — SIGNIFICANT CHANGE UP (ref 130–400)
POTASSIUM SERPL-MCNC: 4.1 MMOL/L — SIGNIFICANT CHANGE UP (ref 3.5–5)
POTASSIUM SERPL-SCNC: 4.1 MMOL/L — SIGNIFICANT CHANGE UP (ref 3.5–5)
PROT SERPL-MCNC: 6.8 G/DL — SIGNIFICANT CHANGE UP (ref 6–8)
PROT UR-MCNC: ABNORMAL
RBC # BLD: 4.9 M/UL — SIGNIFICANT CHANGE UP (ref 4.7–6.1)
RBC # FLD: 13.6 % — SIGNIFICANT CHANGE UP (ref 11.5–14.5)
RBC CASTS # UR COMP ASSIST: 2 /HPF — SIGNIFICANT CHANGE UP (ref 0–4)
SALICYLATES SERPL-MCNC: <0.3 MG/DL — LOW (ref 4–30)
SARS-COV-2 RNA SPEC QL NAA+PROBE: SIGNIFICANT CHANGE UP
SODIUM SERPL-SCNC: 147 MMOL/L — HIGH (ref 135–146)
SP GR SPEC: 1.04 — HIGH (ref 1.01–1.03)
UROBILINOGEN FLD QL: ABNORMAL
VALPROATE SERPL-MCNC: 7 UG/ML — LOW (ref 50–100)
WBC # BLD: 7.99 K/UL — SIGNIFICANT CHANGE UP (ref 4.8–10.8)
WBC # FLD AUTO: 7.99 K/UL — SIGNIFICANT CHANGE UP (ref 4.8–10.8)
WBC UR QL: 4 /HPF — SIGNIFICANT CHANGE UP (ref 0–5)

## 2022-07-19 PROCEDURE — 90792 PSYCH DIAG EVAL W/MED SRVCS: CPT | Mod: 95

## 2022-07-19 NOTE — ED BEHAVIORAL HEALTH ASSESSMENT NOTE - DESCRIPTION
per chart; homeless, recently fired from his job as a  No acute issues. Maintains good behavioral control in ED. as per HPI

## 2022-07-19 NOTE — ED ADULT NURSE NOTE - OBJECTIVE STATEMENT
Patient met in chair, endorses suicidal ideation but has no plan, contracts for safety at this time.

## 2022-07-19 NOTE — ED PROVIDER NOTE - PHYSICAL EXAMINATION
VITAL SIGNS: I have reviewed nursing notes and confirm.  CONSTITUTIONAL: Well-developed; well-nourished; in no acute distress.  SKIN: Skin exam is warm and dry, no acute rash.  HEAD: Normocephalic; atraumatic.  EYES: PERRL, EOM intact; conjunctiva and sclera clear.  ENT: No nasal discharge; airway clear.   CARD: S1, S2 normal; no murmurs, gallops, or rubs. Regular rate and rhythm.  RESP: No wheezes, rales or rhonchi. Speaking in full sentences.   ABD: Normal bowel sounds; soft; non-distended; non-tender; No rebound or guarding.   EXT: Normal ROM. No clubbing, cyanosis or edema.  NEURO: Alert, oriented. Grossly unremarkable. No focal deficits.   PSYCH: Cooperative, appropriate. (+) SI/HI, auditory hallucination

## 2022-07-19 NOTE — ED PROVIDER NOTE - NSFOLLOWUPCLINICS_GEN_ALL_ED_FT
[facility]  OP Mental Health  [address] [place] [address]  Phone: [phone]  Fax:   Follow Up Time: 1-3 Days
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
## 2022-07-19 NOTE — ED BEHAVIORAL HEALTH ASSESSMENT NOTE - OTHER
chronically limited concrete has relationship w/ girlfriend [place] [facility] ([address] [facility]) Records checked– with data: [place], [facility], [facility]. Records checked- no data: [facility], [facility] ED Visits, [facility] Out[facility], [facility] Outpatient [facility], [facility], [facility], [facility],  [facility],  [facility], [facility], [facility], [facility] Inpatient Psychiatry, [facility] Inpatient, [facility], [facility], Scan ER, nysdoccslookup, Webcrims, Google Search. dysthymic not observed helpless

## 2022-07-19 NOTE — ED PROVIDER NOTE - NS ED ROS FT
Review of Systems  Constitutional:  No fever, chills.  Eyes:  No visual changes, eye pain, or discharge.  ENMT:  No hearing changes, pain, or discharge. No nasal congestion, discharge, or bleeding. No throat pain, swelling, or difficulty swallowing.  Cardiac:  No chest pain, palpitations, syncope, or edema.  Respiratory:  No dyspnea, cough. No hemoptysis.  GI:  No nausea, vomiting, diarrhea, or abdominal pain.   :  No dysuria, hematuria, frequency, or burning.   MS:  No back pain.  Skin:  No skin rash, pruritis, jaundice, or lesions.  Neuro:  No headache, dizziness, loss of sensation, or focal weakness.  No change in mental status.

## 2022-07-19 NOTE — ED BEHAVIORAL HEALTH ASSESSMENT NOTE - SAFETY PLAN ADDT'L DETAILS
Safety plan discussed with.../Education provided regarding environmental safety / lethal means restriction/Provision of National Suicide Prevention Lifeline 9-180-379-YQSR (2315)

## 2022-07-19 NOTE — ED PROVIDER NOTE - CLINICAL SUMMARY MEDICAL DECISION MAKING FREE TEXT BOX
26-year-old male presented to the ED with SI/HI.  Patient is hemodynamically stable and well-appearing on evaluation.  Patient has no medical complaints at this time.  Patient is exam is unremarkable.  Patient reports that he is safe in the ER.  Telepsychiatry was consulted who cleared patient for discharge home.  Patient discharged to follow-up outpatient.  Return precautions provided.    I personally evaluated the patient. I reviewed the Resident’s or Physician Assistant’s note (as assigned above), and agree with the findings and plan except as documented in my note.    CONSTITUTIONAL: Well-developed; well-nourished; in no acute distress.   SKIN: warm, dry  HEAD: Normocephalic; atraumatic.  EYES: PERRL, EOMI, normal sclera and conjunctiva   ENT: No nasal discharge; airway clear.  NECK: Supple; non tender.  CARD: S1, S2 normal; no murmurs, gallops, or rubs. Regular rate and rhythm.   RESP: No wheezes, rales or rhonchi.  ABD: soft ntnd  EXT: Normal ROM.  No clubbing, cyanosis or edema.   LYMPH: No acute cervical adenopathy.  NEURO: Alert, oriented, grossly unremarkable  PSYCH: Cooperative, appropriate.
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
## 2022-07-19 NOTE — ED PROVIDER NOTE - PATIENT PORTAL LINK FT
You can access the FollowMyHealth Patient Portal offered by [facility] by registering at the following website: [email or web address]. By joining [employer]’s FollowMyHealth portal, you will also be able to view your health information using other applications (apps) compatible with our system.
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
## 2022-07-19 NOTE — ED PROVIDER NOTE - NSFOLLOWUPINSTRUCTIONS_ED_ALL_ED_FT
Adjustment Disorder, Adult      Adjustment disorder is a group of symptoms that can develop after a stressful life event, such as the loss of a job or serious physical illness. The symptoms can affect how you feel, think, and act. They may interfere with your relationships.    Adjustment disorder increases your risk of suicide and substance abuse. If this disorder is not managed early, it can develop into a more serious condition, such as major depressive disorder or post-traumatic stress disorder.      What are the causes?    This condition happens when you have trouble recovering from or coping with a stressful life event.      What increases the risk?  You are more likely to develop this condition if:  •You have had depression or anxiety.      •You are being treated for a long-term (chronic) illness.      •You are being treated for an illness that cannot be cured (terminal illness).      •You have a family history of mental illness.        What are the signs or symptoms?  Symptoms of this condition include:  •Extreme trouble doing daily tasks, such as going to work.      •Sadness, depression, or crying spells.      •Worrying a lot.      •Loss of enjoyment.      •Change in appetite or weight.      •Feelings of loss or hopelessness.      •Thoughts of suicide.      •Anxiety, worry, or nervousness.      •Trouble sleeping.      •Avoiding family and friends.      •Fighting or vandalism.      •Complaining of feeling sick without being ill.      •Feeling dazed or disconnected.      •Nightmares.      •Trouble sleeping.      •Irritability.      •Reckless driving.      •Poor work performance.      •Ignoring bills.      Symptoms of this condition start within three months of the stressful event. They do not last more than six months, unless the stressful circumstances last longer. Normal grieving after the death of a loved one is not a symptom of this condition.      How is this diagnosed?    To diagnose this condition, your health care provider will ask about what has happened in your life and how it has affected you. He or she may also ask about your medical history and your use of medicines, alcohol, and other substances. Your health care provider may do a physical exam and order lab tests or other studies. You may be referred to a mental health specialist.      How is this treated?  Treatment options for this condition include:  •Counseling or talk therapy. Talk therapy is usually provided by mental health specialists.      •Medicines. Certain medicines may help with depression, anxiety, and sleep.      •Support groups. These offer emotional support, advice, and guidance. They are made up of people who have had similar experiences.      •Observation and time. This is sometimes called "watchful waiting." In this treatment, health care providers monitor your health and behavior without other treatment. Adjustment disorder sometimes gets better on its own with time.        Follow these instructions at home:    •Take over-the-counter and prescription medicines only as told by your health care provider.      •Keep all follow-up visits as told by your health care provider. This is important.        Contact a health care provider if:    •Your symptoms do not improve in six months.      •Your symptoms get worse.        Get help right away if:    •You have serious thoughts about hurting yourself or someone else.    If you ever feel like you may hurt yourself or others, or have thoughts about taking your own life, get help right away. You can go to your nearest emergency department or call:   • Your local emergency services (911 in the [place]       • A suicide crisis helpline, such as the National Suicide Prevention Lifeline at 1-577.355.4411. This is open 24 hours a day.         Summary    •Adjustment disorder is a group of symptoms that can develop after a stressful life event, such as the loss of a job or serious physical illness. The symptoms can affect how you feel, think, and act. They may interfere with your relationships.      •Symptoms of this condition start within three months of the stressful event. They do not last more than six months, unless the stressful circumstances last longer.      •Treatment may include talk therapy, medicines, participation in a support group, or observation to see if symptoms improve.      •Contact your health care provider if your symptoms get worse or do not improve in six months.      •If you ever feel like you may hurt yourself or others, or have thoughts about taking your own life, get help right away.      This information is not intended to replace advice given to you by your health care provider. Make sure you discuss any questions you have with your health care provider.      Document Revised: 11/30/2018 Document Reviewed: 02/16/2018    Elsevier Patient Education © 2020 Elsevier Inc.
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
## 2022-07-19 NOTE — ED PROVIDER NOTE - NS ED MD DISPO DISCHARGE CCDA
normal appearance , without tenderness upon palpation , no deformities , trachea midline , Thyroid normal size , no thyroid nodules , no masses , no JVD , thyroid nontender
Patient/Caregiver provided printed discharge information.

## 2022-07-21 DIAGNOSIS — Z88.1 ALLERGY STATUS TO OTHER ANTIBIOTIC AGENTS STATUS: ICD-10-CM

## 2022-07-21 DIAGNOSIS — F12.20 CANNABIS DEPENDENCE, UNCOMPLICATED: ICD-10-CM

## 2022-07-21 DIAGNOSIS — F31.4 BIPOLAR DISORDER, CURRENT EPISODE DEPRESSED, SEVERE, WITHOUT PSYCHOTIC FEATURES: ICD-10-CM

## 2022-07-21 DIAGNOSIS — F19.10 OTHER PSYCHOACTIVE SUBSTANCE ABUSE, UNCOMPLICATED: ICD-10-CM

## 2022-07-21 DIAGNOSIS — R45.851 SUICIDAL IDEATIONS: ICD-10-CM

## 2022-07-21 DIAGNOSIS — Z59.00 HOMELESSNESS UNSPECIFIED: ICD-10-CM

## 2022-07-21 DIAGNOSIS — F63.81 INTERMITTENT EXPLOSIVE DISORDER: ICD-10-CM

## 2022-07-21 DIAGNOSIS — F84.0 AUTISTIC DISORDER: ICD-10-CM

## 2022-07-21 DIAGNOSIS — F10.20 ALCOHOL DEPENDENCE, UNCOMPLICATED: ICD-10-CM

## 2022-07-21 DIAGNOSIS — F15.20 OTHER STIMULANT DEPENDENCE, UNCOMPLICATED: ICD-10-CM

## 2022-07-21 DIAGNOSIS — F39 UNSPECIFIED MOOD [AFFECTIVE] DISORDER: ICD-10-CM

## 2022-07-21 DIAGNOSIS — F17.210 NICOTINE DEPENDENCE, CIGARETTES, UNCOMPLICATED: ICD-10-CM

## 2022-07-21 DIAGNOSIS — R01.1 CARDIAC MURMUR, UNSPECIFIED: ICD-10-CM

## 2022-07-21 DIAGNOSIS — F14.20 COCAINE DEPENDENCE, UNCOMPLICATED: ICD-10-CM

## 2022-07-21 DIAGNOSIS — Z72.89 OTHER PROBLEMS RELATED TO LIFESTYLE: ICD-10-CM

## 2022-07-21 DIAGNOSIS — F43.10 POST-TRAUMATIC STRESS DISORDER, UNSPECIFIED: ICD-10-CM

## 2022-07-21 SDOH — ECONOMIC STABILITY - HOUSING INSECURITY: HOMELESSNESS UNSPECIFIED: Z59.00

## 2022-07-22 ENCOUNTER — EMERGENCY (EMERGENCY)
Facility: HOSPITAL | Age: 27
LOS: 0 days | Discharge: HOME | End: 2022-07-22
Attending: EMERGENCY MEDICINE | Admitting: EMERGENCY MEDICINE
Payer: MEDICAID

## 2022-07-22 VITALS — HEART RATE: 54 BPM | SYSTOLIC BLOOD PRESSURE: 100 MMHG | DIASTOLIC BLOOD PRESSURE: 64 MMHG

## 2022-07-22 VITALS
RESPIRATION RATE: 16 BRPM | DIASTOLIC BLOOD PRESSURE: 60 MMHG | OXYGEN SATURATION: 98 % | HEART RATE: 60 BPM | HEIGHT: 64 IN | SYSTOLIC BLOOD PRESSURE: 100 MMHG | TEMPERATURE: 98 F | WEIGHT: 179.9 LBS

## 2022-07-22 DIAGNOSIS — F32.A DEPRESSION, UNSPECIFIED: ICD-10-CM

## 2022-07-22 DIAGNOSIS — F19.10 OTHER PSYCHOACTIVE SUBSTANCE ABUSE, UNCOMPLICATED: ICD-10-CM

## 2022-07-22 DIAGNOSIS — Z88.1 ALLERGY STATUS TO OTHER ANTIBIOTIC AGENTS STATUS: ICD-10-CM

## 2022-07-22 DIAGNOSIS — R45.851 SUICIDAL IDEATIONS: ICD-10-CM

## 2022-07-22 DIAGNOSIS — R01.1 CARDIAC MURMUR, UNSPECIFIED: ICD-10-CM

## 2022-07-22 LAB
ALBUMIN SERPL ELPH-MCNC: 4.7 G/DL — SIGNIFICANT CHANGE UP (ref 3.5–5.2)
ALP SERPL-CCNC: 65 U/L — SIGNIFICANT CHANGE UP (ref 30–115)
ALT FLD-CCNC: 20 U/L — SIGNIFICANT CHANGE UP (ref 0–41)
ANION GAP SERPL CALC-SCNC: 11 MMOL/L — SIGNIFICANT CHANGE UP (ref 7–14)
APAP SERPL-MCNC: <5 UG/ML — LOW (ref 10–30)
AST SERPL-CCNC: 23 U/L — SIGNIFICANT CHANGE UP (ref 0–41)
BASOPHILS # BLD AUTO: 0.04 K/UL — SIGNIFICANT CHANGE UP (ref 0–0.2)
BASOPHILS NFR BLD AUTO: 0.6 % — SIGNIFICANT CHANGE UP (ref 0–1)
BILIRUB SERPL-MCNC: 0.2 MG/DL — SIGNIFICANT CHANGE UP (ref 0.2–1.2)
BUN SERPL-MCNC: 18 MG/DL — SIGNIFICANT CHANGE UP (ref 10–20)
CALCIUM SERPL-MCNC: 9.3 MG/DL — SIGNIFICANT CHANGE UP (ref 8.5–10.1)
CHLORIDE SERPL-SCNC: 106 MMOL/L — SIGNIFICANT CHANGE UP (ref 98–110)
CO2 SERPL-SCNC: 23 MMOL/L — SIGNIFICANT CHANGE UP (ref 17–32)
CREAT SERPL-MCNC: 0.8 MG/DL — SIGNIFICANT CHANGE UP (ref 0.7–1.5)
EGFR: 124 ML/MIN/1.73M2 — SIGNIFICANT CHANGE UP
EOSINOPHIL # BLD AUTO: 0.32 K/UL — SIGNIFICANT CHANGE UP (ref 0–0.7)
EOSINOPHIL NFR BLD AUTO: 4.4 % — SIGNIFICANT CHANGE UP (ref 0–8)
ETHANOL SERPL-MCNC: <10 MG/DL — SIGNIFICANT CHANGE UP
GLUCOSE SERPL-MCNC: 103 MG/DL — HIGH (ref 70–99)
HCT VFR BLD CALC: 41.3 % — LOW (ref 42–52)
HGB BLD-MCNC: 13.5 G/DL — LOW (ref 14–18)
IMM GRANULOCYTES NFR BLD AUTO: 1 % — HIGH (ref 0.1–0.3)
LYMPHOCYTES # BLD AUTO: 1.89 K/UL — SIGNIFICANT CHANGE UP (ref 1.2–3.4)
LYMPHOCYTES # BLD AUTO: 26.1 % — SIGNIFICANT CHANGE UP (ref 20.5–51.1)
MCHC RBC-ENTMCNC: 27.6 PG — SIGNIFICANT CHANGE UP (ref 27–31)
MCHC RBC-ENTMCNC: 32.7 G/DL — SIGNIFICANT CHANGE UP (ref 32–37)
MCV RBC AUTO: 84.3 FL — SIGNIFICANT CHANGE UP (ref 80–94)
MONOCYTES # BLD AUTO: 0.55 K/UL — SIGNIFICANT CHANGE UP (ref 0.1–0.6)
MONOCYTES NFR BLD AUTO: 7.6 % — SIGNIFICANT CHANGE UP (ref 1.7–9.3)
NEUTROPHILS # BLD AUTO: 4.37 K/UL — SIGNIFICANT CHANGE UP (ref 1.4–6.5)
NEUTROPHILS NFR BLD AUTO: 60.3 % — SIGNIFICANT CHANGE UP (ref 42.2–75.2)
NRBC # BLD: 0 /100 WBCS — SIGNIFICANT CHANGE UP (ref 0–0)
PLATELET # BLD AUTO: 285 K/UL — SIGNIFICANT CHANGE UP (ref 130–400)
POTASSIUM SERPL-MCNC: 4.5 MMOL/L — SIGNIFICANT CHANGE UP (ref 3.5–5)
POTASSIUM SERPL-SCNC: 4.5 MMOL/L — SIGNIFICANT CHANGE UP (ref 3.5–5)
PROT SERPL-MCNC: 7 G/DL — SIGNIFICANT CHANGE UP (ref 6–8)
RBC # BLD: 4.9 M/UL — SIGNIFICANT CHANGE UP (ref 4.7–6.1)
RBC # FLD: 13.8 % — SIGNIFICANT CHANGE UP (ref 11.5–14.5)
SALICYLATES SERPL-MCNC: <0.3 MG/DL — LOW (ref 4–30)
SODIUM SERPL-SCNC: 140 MMOL/L — SIGNIFICANT CHANGE UP (ref 135–146)
WBC # BLD: 7.24 K/UL — SIGNIFICANT CHANGE UP (ref 4.8–10.8)
WBC # FLD AUTO: 7.24 K/UL — SIGNIFICANT CHANGE UP (ref 4.8–10.8)

## 2022-07-22 PROCEDURE — 99285 EMERGENCY DEPT VISIT HI MDM: CPT

## 2022-07-22 PROCEDURE — 93010 ELECTROCARDIOGRAM REPORT: CPT

## 2022-07-22 NOTE — ED ADULT TRIAGE NOTE - CHIEF COMPLAINT QUOTE
I'm suicidal, I got kicked out for my birthday, I got no one - patient   Patient reports non compliant with meds for months, using drugs, plan to OD on ectasy, cocaine and liqueur
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
## 2022-07-22 NOTE — ED BEHAVIORAL HEALTH ASSESSMENT NOTE - ADDITIONAL DETAILS ALL
recent prior SA 3/2022 of intentional overdose and wanting to jump from the ledge of a [place].

## 2022-07-22 NOTE — ED BEHAVIORAL HEALTH NOTE - BEHAVIORAL HEALTH NOTE
Writer attempted to set patient up for consult; after multiple attempts to connect to attending/RN unable to set patient up. Instructed ED staff to call telepsych when patient is able to be set up for consult.
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
## 2022-07-22 NOTE — ED PROVIDER NOTE - OBJECTIVE STATEMENT
27M pmh polysub abuse, depression, bipolar d/o p/w SI. States he is depressed after recent eviction, has thoughts of killing himself by overdosing on illicit drugs incl ecstasy & cocaine. Denies any etoh or illicit drug use today. Denies HA, AVH. No other medical complaints. Seen in ED 7/18 for SI / HI, cleared for d/c by psych.

## 2022-07-22 NOTE — ED BEHAVIORAL HEALTH ASSESSMENT NOTE - RISK ASSESSMENT
overall some chronic risk but not modifable by inpt admission  pt unwilling to safety plan Low Acute Suicide Risk

## 2022-07-22 NOTE — ED BEHAVIORAL HEALTH ASSESSMENT NOTE - ADULT OR CHILD PROTECTIVE SERVICES INVOLVEMENT
PATIENT STATES SHE CALLED TO SCHEDULE THE SCREENING MAMMOGRAM. SCHEDULING TOLD HER THE ORDERS NEED TO BE CHANGED TO A DIAGNOTIC MAMMOGRAM SO THEY CAN GET MORE VIEWS BECAUSE DR REYES FELT A THICKENING ON 4/1. DIAGNOTIC MAMMOGRAM IS SCHEDULED FOR 4/6 PENDING THE ORDERS BEING ISSUED.    PLEASE ADVISE: [phone]   No

## 2022-07-22 NOTE — ED PROVIDER NOTE - NSFOLLOWUPCLINICS_GEN_ALL_ED_FT
[facility]  Detox Mgmt  [address] [place] [address]  Phone: [phone]  Fax:   Follow Up Time: 1-3 Days    [facility]  OP Mental Health  [address] [place] [address]  Phone: [phone]  Fax:   Follow Up Time: 1-3 Days
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
## 2022-07-22 NOTE — ED ADULT NURSE NOTE - TEMPLATE
Called patient to schedule Physical prior to refill, unable to. Patient voicemail not set up, will try again another day.   Psych/Behavioral

## 2022-07-22 NOTE — ED BEHAVIORAL HEALTH ASSESSMENT NOTE - KNOWN PSYCHIATRIC ADMISSION WITHIN THE PAST 30 DAYS
Onset: 6 hours ago  Location / description: diarrhea 1 times in 6 hours, 5 soft stools during this time.    Precipitating Factors: recent c diff requesting to restart oral vancomycin. No blood in stool mild cramping.   Pain Scale (1 - 10), 10 highest: no  No fever     No

## 2022-07-22 NOTE — ED BEHAVIORAL HEALTH ASSESSMENT NOTE - DESCRIPTION
homeless, recently fired from his job as a  the patient remains calm, cooperative, gave bloodwork none

## 2022-07-22 NOTE — ED PROVIDER NOTE - CLINICAL SUMMARY MEDICAL DECISION MAKING FREE TEXT BOX
depression, SI, recent clearance by psych 7/18 - BAL nl, seen & cleared for discharge by psych - all results d/w pt & copies given, strict return precautions discussed, rec outpt Psych/Detox f/u
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
## 2022-07-22 NOTE — ED PROVIDER NOTE - PATIENT PORTAL LINK FT
You can access the FollowMyHealth Patient Portal offered by [facility] by registering at the following website: [email or web address]. By joining [employer]’s FollowMyHealth portal, you will also be able to view your health information using other applications (apps) compatible with our system.

## 2022-09-08 NOTE — PROGRESS NOTE BEHAVIORAL HEALTH - THOUGHT CONTENT
Unremarkable
no loss of consciousness, no gait abnormality, no headache, no sensory deficits, and no weakness.

## 2022-09-14 NOTE — ED BEHAVIORAL HEALTH ASSESSMENT NOTE - NS ED BHA DEMOGRAPHICS MEDICAL RECORD REVIEWED CONSENT OBTAINED N DETAILS
Birth Control Pills Pregnancy And Lactation Text: This medication should be avoided if pregnant and for the first 30 days post-partum. Danger to self/others/Other

## 2022-09-25 NOTE — ED BEHAVIORAL HEALTH ASSESSMENT NOTE - REFERRED BY
Patient settled into room 3026 in stable condition. Report received from KEE Nicole. Baby band verified. Room orientation provided. IPOC and education initiated.    Self

## 2022-11-03 NOTE — PROGRESS NOTE BEHAVIORAL HEALTH - RISK ASSESSMENT
The patient presents with risk factors that include prior SA, prior hospitalizations, active substance abuse, active SI with plan and intent, homelessness, without a job, without social support.  Protective factors include help-seeking behavior, desire to resume treatment, support from his mother at a distance.
The patient presents with risk factors that include prior SA, prior hospitalizations, active substance abuse, active SI with plan and intent, homelessness, without a job, without social support.  Protective factors include help-seeking behavior, desire to resume treatment, support from his mother at a distance.
Risk factors include treatment non-compliance, prior SA, prior hospitalizations, active substance abuse,  homelessness, without a job, without social support.  Protective factors include help-seeking behavior, desire to resume treatment, support from grandmother
The patient presents with risk factors that include prior SA, prior hospitalizations, active substance abuse, active SI with plan and intent, homelessness, without a job, without social support.  Protective factors include help-seeking behavior, desire to resume treatment, support from his mother at a distance.
Risk factors include treatment non-compliance, prior SA, prior hospitalizations, active substance abuse,  homelessness, without a job, without social support.  Protective factors include help-seeking behavior, desire to resume treatment, support from grandmother
The patient presents with risk factors that include prior SA, prior hospitalizations, active substance abuse, active SI with plan and intent, homelessness, without a job, without social support.  Protective factors include help-seeking behavior, desire to resume treatment, support from his mother at a distance.
The patient presents with risk factors that include prior SA, prior hospitalizations, active substance abuse, active SI with plan and intent, homelessness, without a job, without social support.  Protective factors include help-seeking behavior, desire to resume treatment, support from his mother at a distance.
Home

## 2023-01-05 NOTE — ED ADULT TRIAGE NOTE - PRO INTERPRETER NEED 2
[facility] nurses, pls contact patient with the following:    -goal <100. Reduce fried fatty foods in diet. Exercise. Slightly elevated creatinine 1.43, GFR 46.  Continue to follow up with Dr Esteban Rosado     Thank you
English

## 2023-01-17 NOTE — ED BEHAVIORAL HEALTH ASSESSMENT NOTE - NS ED BHA PLAN HOLD IN ED HANDOFF TO ED TEAM YN
Yes Full Thickness Lip Wedge Repair (Flap) Text: Given the location of the defect and the proximity to free margins a full thickness wedge repair was deemed most appropriate.  Using a sterile surgical marker, the appropriate repair was drawn incorporating the defect and placing the expected incisions perpendicular to the vermillion border.  The vermillion border was also meticulously outlined to ensure appropriate reapproximation during the repair.  The area thus outlined was incised through and through with a #15 scalpel blade.  The muscularis and dermis were reaproximated with deep sutures following hemostasis. Care was taken to realign the vermillion border before proceeding with the superficial closure.  Once the vermillion was realigned the superfical and mucosal closure was finished.

## 2023-03-08 NOTE — ED BEHAVIORAL HEALTH ASSESSMENT NOTE - NS ED BHA DEMOGRAPHICS MEDICAL RECORD REVIEWED CONSENT OBTAINED YN
Consult Service: Gastroenterology      PATIENT INFORMATION      Girma Herrera 62 y o  male MRN: [id number]  Unit/Bed#: -28 Encounter: [id number]  PCP: Yusra Pollock MD  Date of Admission:  2/24/2023  Date of Consultation: 03/08/23  Requesting Physician: Francoise Mccann, DO       ASSESSMENTS & PLAN   Girma Herrera is a 62 y o  old male with PMH of metastatic colon cancer to the liver, don's esophagus who was admitted to [facility] for inpatient rehabilitation  Patient has complex medical history with multiple surgery  We are consulted this time for elevated bilirubin  Elevated bilirubin   • Recent CT AP on 02/22/23 showed "Especially when comparing to 1/11/2023, interval progression/worsening of heterogeneous low-density masses of the liver  Likely metastases  Cannot exclude infection "  • 03/07/23: IR tube check with diagnostic cholangiogram reported "demonstrating filling of cystic and common bile duct, minimal pain, no leaking, slight adjustment of tube further into gallbladder body"  • Suspect secondary to metastatic liver lesions,? choledocholithiasis (IR tube check reported patent bile duct)  Will get MRI/MRCP to access for any obstruction  Elevated alkaline phosphatase  • Suspect secondary to metastatic liver lesions, possible choledocholithiasis, bone metastasis? • 12/17/22: elevated GGT, Alk phos isoenzymes suggest 48% liver fraction and 52% bone fraction        Colon cancer with metastatic disease  • Patient underwent multiple surgery  • status post ex lap with bowel resection, ileostomy, partial hepatectomy given intra-abdominal abscess  • Follows medical oncology as an outpatient    History of acute cholecystitis  • Status post percutaneous cholecystostomy tube placement in Dec 2022  • IR drained check on 03/07/2023    Plan: Will plan for MRI/MRCP without contrast to evaluate for any obstruction     Trend LFT     Gastroenterology has been consulted for assistance with management of elevated bilirubin  HISTORY OF PRESENT ILLNESS      Dana Mata is a 62 y o  male who is originally admitted to [facility] for comprehensive inpatient rehabilitation and is being consulted for elevated bilirubin  Patient has complex medical history with multiple surgery  He underwent diverting colostomy in 02/22 and had colostomy reversal and hepatic resection in 11/22, subsequently he underwent exploratory lap with bowel resection, right hemicolectomy, partial descending colon resection, colocolonic anastomosis with loop ileosotmy  In Dec 2022, per radha tube was placed  And per radha tube was exchanged in Feb 2023  GI team seen him on previous admission for elevated ALP and this time for elevated bilirubin  Patient was seen and examined at the bedside  He reports of RUQ abdominal pain which he attributed to tube manipulation yesterday  He reports of subjective fever and sweating a lot last night  He reports pain medication was not enough for last night and he had rough night due to abdominal pain  He denies any SOB, CP, lightheadedness, dizziness, abdominal distension or leg swelling  He wears stocking due to orthostatic hypotension  He reports no changes in stomy bag output, no blood in it  Currently he is sitting comfortably in the chair and ready to participate in PT  REVIEW OF SYSTEMS     A thorough 12-point review of systems has been conducted  Pertinent positives and negatives are mentioned in the history of present illness         PAST MEDICAL & SURGICAL HISTORY      Past Medical History:   Diagnosis Date   • Abdominal pain 03/12/2022   • Acute renal failure ([facility] )     [id number] resolved   • Acute respiratory failure with hypoxia ([facility] ) 11/12/2022   • Bacteremia 11/12/2022   • Cancer ([facility] )    • Diabetes mellitus ([facility] )    • Enteritis 08/23/2016   • Flash pulmonary edema ([address] ) 11/12/2022   • Gastroparesis due to DM ([facility] ) 08/23/2016   • GERD (gastroesophageal reflux disease)    • Hernia, ventral 08/04/2016   • Hyperlipidemia    • Hypertension    • Morbid obesity ([facility] ) 04/17/2018   • Postoperative visit 03/02/2022   • SIRS (systemic inflammatory response syndrome) ([facility] ) 03/12/2022   • Snoring    • Stage 3a chronic kidney disease ([facility] ) 02/19/2022       Past Surgical History:   Procedure Laterality Date   • COLONOSCOPY     • COLOSTOMY N/A 02/20/2022    Procedure: COLOSTOMY LOOP, diverting;  Surgeon: Namita Lara MD;  Location: [facility];  Service: General   • ESOPHAGOGASTRODUODENOSCOPY N/A 08/24/2016    Procedure: ESOPHAGOGASTRODUODENOSCOPY (EGD); Surgeon: Melvin Faulkner MD;  Location: [facility] GI LAB;   Service:    • EXPLORATORY LAPAROTOMY W/ BOWEL RESECTION N/A 11/16/2022    Procedure: LAPAROTOMY EXPLORATORY W/ BOWEL RESECTION- VAC PLACEMENT;  Surgeon: Nadine Bains MD;  Location: [facility] MAIN OR;  Service: Surgical Oncology   • EXPLORATORY LAPAROTOMY W/ BOWEL RESECTION N/A 11/17/2022    Procedure: LAPAROTOMY EXPLORATORY W/ BOWEL RESECTION;  Surgeon: Nadine Bains MD;  Location: [facility];  Service: Surgical Oncology   • ILEOSTOMY N/A 11/17/2022    Procedure: ILEOSTOMY;  Surgeon: Nadine Bains MD;  Location: [facility];  Service: Surgical Oncology   • ILEOSTOMY CLOSURE N/A 11/7/2022    Procedure: REVERSAL COLOSTOMY;  Surgeon: Suresh West MD;  Location: [facility];  Service: Surgical Oncology   • IR CHOLECYSTOSTOMY TUBE CHECK AND/OR REMOVAL  2/24/2023   • IR CHOLECYSTOSTOMY TUBE CHECK/CHANGE/REPOSITION/REINSERTION/UPSIZE  12/12/2022   • IR CHOLECYSTOSTOMY TUBE PLACEMENT  12/8/2022   • IR DRAINAGE TUBE CHECK AND/OR REMOVAL  1/3/2023   • IR DRAINAGE TUBE CHECK/CHANGE/REPOSITION/REINSERTION/UPSIZE  1/12/2023   • IR DRAINAGE TUBE CHECK/CHANGE/REPOSITION/REINSERTION/UPSIZE  1/31/2023   • IR DRAINAGE TUBE CHECK/CHANGE/REPOSITION/REINSERTION/UPSIZE  2/10/2023   • IR DRAINAGE TUBE CHECK/CHANGE/REPOSITION/REINSERTION/UPSIZE  2/16/2023   • IR DRAINAGE TUBE CHECK/CHANGE/REPOSITION/REINSERTION/UPSIZE  2/24/2023   • IR DRAINAGE TUBE PLACEMENT  12/8/2022   • IR DRAINAGE TUBE PLACEMENT  12/20/2022   • IR PORT CHECK  2/24/2023   • IR PORT PLACEMENT  03/10/2022   • IR THORACENTESIS  1/12/2023   • KIDNEY STONE SURGERY     • LIVER BIOPSY LAPAROSCOPIC N/A 02/20/2022    Procedure: DIAGNOSTIC LAPAROSCOPIC LIVER BIOPSY, DIVERTING LOOP COLOSTOMY ;  Surgeon: Rogelio Birmingham MD;  Location: [facility];  Service: General   • LIVER LOBECTOMY N/A 11/7/2022    Procedure: SEGMENT 3 LIVER RESECTION, ABLATION, INTRAOPERATIVE U/S OF LIVER, PERITONEAL BIOPSY;  Surgeon: Kati Dorado MD;  Location: [facility];  Service: Surgical Oncology   • RIGHT COLON RESECTION N/A 11/7/2022    Procedure: EX LAP, TRANVERSE COLON RESECTION;  Surgeon: Kati Dorado MD;  Location: [facility];  Service: Surgical Oncology   • TONSILLECTOMY     • UMBILICAL HERNIA REPAIR LAPAROSCOPIC N/A 04/26/2019    Procedure: LAPAROSCOPIC UMBILICAL HERNIA REPAIR;  Surgeon: Rogelio Birmingham MD;  Location: [facility];  Service: General   • VAC DRESSING APPLICATION N/A 12/18/4987    Procedure: APPLICATION VAC DRESSING ABDOMEN/TRUNK;  Surgeon: Bijan Moise MD;  Location: [facility];  Service: Surgical Oncology         MEDICATIONS & ALLERGIES       Medications:   Prior to Admission medications    Medication Sig Start Date End Date Taking? Authorizing Provider   acetaminophen (TYLENOL) 325 mg tablet Take 1 tablet (325 mg total) by mouth every 4 (four) hours as needed for mild pain  Patient not taking: Reported on 2/7/2023 1/16/23   GAURANG Penaloza   amLODIPine (NORVASC) 2 5 mg tablet Take 1 tablet (2 5 mg total) by mouth daily 2/2/23   Citlaly Peralta MD   aspirin 81 MG tablet Take 81 mg by mouth daily      Historical Provider, MD   atorvastatin (LIPITOR) 40 mg tablet Take 1 tablet (40 mg total) by mouth daily 1/20/23   Nora Elliott MD   Cholecalciferol (VITAMIN D3 PO) Take 400 Units by mouth daily    Historical Provider, MD   collagenase (SANTYL) ointment Apply topically daily To the abdominal incision area 1/20/23   Ady Browne MD   Continuous Blood Gluc Sensor (FreeStyle Parrish 14 Day Sensor) MISC Use 1 each every 14 (fourteen) days 3/11/22   GAURANG Pollack   DULoxetine (CYMBALTA) 30 mg delayed release capsule Take 1 capsule (30 mg total) by mouth daily Do not start before January 17, 2023 1/17/23   GAURANG Keith   insulin glargine (LANTUS) 100 units/mL subcutaneous injection Inject 5 Units under the skin daily at bedtime 2/24/23   [facility], DO   insulin lispro (HumaLOG) 100 units/mL injection Inject 4 Units under the skin 3 (three) times a day with meals 2/24/23   [facility], DO   Insulin Pen Needle (B-D UF III MINI PEN NEEDLES) 31G X 5 MM MISC Inject under the skin daily 5/13/22   Marion Arreguin MD   Insulin Pen Needle (BD Pen Needle Jessica 2nd Gen) 32G X 4 MM MISC For use with insulin pen  Pharmacy may dispense brand covered by insurance  1/16/23   GAURANG Keith   Insulin Pen Needle (BD Pen Needle Jessica 2nd Gen) 32G X 4 MM MISC For use with insulin pen  Pharmacy may dispense brand covered by insurance  1/16/23   GAURANG Keith   melatonin 3 mg Take 2 tablets (6 mg total) by mouth daily at bedtime 2/24/23   [facility], DO   methocarbamol (ROBAXIN) 500 mg tablet Take 1 tablet (500 mg total) by mouth 2 (two) times a day as needed for muscle spasms 2/24/23   [facility], DO   Multiple Vitamins-Minerals (multivitamin with minerals) tablet Take 1 tablet by mouth daily    Historical Provider, MD   naloxone (NARCAN) 4 mg/0 1 mL nasal spray Administer 1 spray into a nostril  If no response after 2-3 minutes, give another dose in the other nostril using a new spray   1/30/23   GAURANG Torres   Needle, Disp, (HYPODERMIC NEEDLE) 23G X 1-1/2" MISC by Does not apply route once a week 5/10/18   Nataliya Cai PA-C   ondansetron (ZOFRAN-ODT) 4 mg disintegrating tablet Take 1 tablet (4 mg total) by mouth every 6 (six) hours as needed (nausea,vomiting)  Patient not taking: Reported on 2/7/2023 1/16/23   Em Allen, Sa[address] [place] Use in the morning 2/23/22   Bethany Garnett MD   pantoprazole (PROTONIX) 40 mg tablet Take 1 tablet (40 mg total) by mouth 2 (two) times a day 1/20/23   Jeremy Anne MD   simethicone (MYLICON) 80 mg chewable tablet Chew 1 tablet (80 mg total) 4 (four) times a day (with meals and at bedtime) 1/20/23   Jereym Anne MD   sodium chloride, PF, 0 9 % 10 mL by Intracatheter route daily 1/20/23   Jeremy Anne MD   [facility]) 0 4 mg Take 1 capsule (0 4 mg total) by mouth daily with dinner 12/14/22   Maryam Duval MD       Allergies: Allergies   Allergen Reactions   • Shellfish-Derived Products - Food Allergy Anaphylaxis   • Erythromycin GI Intolerance         SOCIAL HISTORY      Marital Status: /Civil Union    Substance Use History:   Social History     Substance and Sexual Activity   Alcohol Use Never     Social History     Tobacco Use   Smoking Status Never   Smokeless Tobacco Never     Social History     Substance and Sexual Activity   Drug Use No         FAMILY HISTORY      Non-Contributory      PHYSICAL EXAM     Vitals:   Blood Pressure: 133/79 (03/08/23 0559)  Pulse: 88 (03/08/23 0559)  Temperature: 98 1 °F (36 7 °C) (03/08/23 0559)  Temp Source: Oral (03/08/23 0559)  Respirations: 17 (03/08/23 0559)  Height: 5' 9" (175 3 cm) (02/25/23 1257)  Weight - Scale: 80 1 kg (176 lb 9 4 oz) (03/01/23 0623)  SpO2: 95 % (03/08/23 0559)    Physical Exam:   GENERAL: NAD, chronically ill-appearing  HEENT:  NC/AT, MMM  CARDIAC:  RRR, +S1/S2, no S3/S4 heard  PULMONARY:  CTA B/L, no wheezing/rales/rhonci, non-labored breathing  ABDOMEN:  Soft, NT/ND, +BS, no rebound/guarding/rigidity  Colostomy bag filled with yellow liquid stool  Alvina tube -capped     DIGITAL RECTAL EXAM: not indicated NEUROLOGIC:  Alert/oriented x3  SKIN:  No rashes or erythema       ADDITIONAL DATA     Lab Results:     Results from last 7 days   Lab Units 03/07/23  0549   WBC Thousand/uL 17 07*   HEMOGLOBIN g/dL 7 6*   HEMATOCRIT % 24 5*   PLATELETS Thousands/uL 351   NEUTROS PCT % 82*   LYMPHS PCT % 7*   MONOS PCT % 9   EOS PCT % 0     Results from last 7 days   Lab Units 03/08/23  0615   POTASSIUM mmol/L 4 0   CHLORIDE mmol/L 105   CO2 mmol/L 17*   BUN mg/dL 22   CREATININE mg/dL 1 54*   CALCIUM mg/dL 8 1*   ALK PHOS U/L 982*   ALT U/L 14   AST U/L 90*           Imaging:    CT chest abdomen pelvis wo contrast    Result Date: 2/7/2023  Impression: 1  Interval decrease in bilateral pleural effusions with development of multiple small bilateral lung nodules concerning for metastatic lung disease  2   Fluid collection at the level of the left lobe of the liver is smaller with resolution of the other perisplenic fluid collections and improvement in collections in the left retroperitoneum inferior to the spleen  The study was marked in [facility] for immediate notification  Workstation performed: [id number]     US right upper quadrant    Result Date: 2/8/2023  Impression: Decompressed gallbladder around a cholecystostomy tube, limiting evaluation  Hepatomegaly  Heterogeneous echotexture of the liver in this patient with known metastatic disease  7 mm nonobstructing right intrarenal calculus  Workstation performed: [id number]     CT abdomen pelvis w contrast    Result Date: 2/22/2023  Impression: 1  Interval worsening of moderate right and small left pleural effusions  2   Especially when comparing to 1/11/2023, interval progression/worsening of heterogeneous low-density masses of the liver  Likely metastases  Cannot exclude infection  3   Liver drain and a cystostomy tubes are stable  The gallbladder demonstrates irregular wall thickening/pericholecystic edema consistent with cholecystitis   Workstation performed: [id number]     IR port check    Result Date: 2/24/2023  Impression: 1  Port check  2   Midline epigastric abscessogram with catheter removal  3   Cholecystostomy catheter exchange  PLAN: The radha-enteric fistula was not present on initial tube checks several months ago, and only was identified recently  This likely relates to chronic inflammation within the right upper quadrant  Continue to gravity drainage and to flush daily  Workstation performed: [id number]     IR drainage tube check/change/reposition/reinsertion/upsize    Result Date: 2/24/2023  Impression: 1  Port check  2   Midline epigastric abscessogram with catheter removal  3   Cholecystostomy catheter exchange  PLAN: The radha-enteric fistula was not present on initial tube checks several months ago, and only was identified recently  This likely relates to chronic inflammation within the right upper quadrant  Continue to gravity drainage and to flush daily  Workstation performed: [id number]     IR drainage tube check/change/reposition/reinsertion/upsize    Result Date: 2/16/2023  Impression: 1  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  2   Epigastric abscess drainage catheter check showed no residual abscess cavity  Plan: - Cholecystostomy tube kept in place to bag gravity drainage  Patient is due for routine catheter exchange in March 2023  - Epigastric abscess drainage catheter kept in place due to persistent purulent output  Workstation performed: [id number]     IR drainage tube check/change/reposition/reinsertion/upsize    Result Date: 2/10/2023  Impression: Impression: Cholecystostomy is identified within the gallbladder however small bowel opacifies prior to good opacification of the cystic and common ducts raising question of a choleenteric fistula  Abscess catheter in good position however output is persistent   Follow-up tube check in approximately one week Workstation performed: [id number]     IR cholecystostomy tube check and/or removal    Result Date: 2/24/2023  Impression: 1  Port check  2   Midline epigastric abscessogram with catheter removal  3   Cholecystostomy catheter exchange  PLAN: The radha-enteric fistula was not present on initial tube checks several months ago, and only was identified recently  This likely relates to chronic inflammation within the right upper quadrant  Continue to gravity drainage and to flush daily  Workstation performed: [id number]       EKG, Pathology, and Other Studies Reviewed on Admission:   EKG: Not obtained    Karen Ngo MD  Internal Medicine Residency, PGY-3    Counseling / Coordination of Care Time: 30 total mins spent n consult  Greater than 50% of total time spent on patient counseling and coordination of care  Yes

## 2023-03-27 NOTE — DISCHARGE NOTE BEHAVIORAL HEALTH - NSBHDCVIOLRISK_PSY_A_CORE
yes... Niacinamide Counseling: I recommended taking niacin or niacinamide, also know as vitamin B3, twice daily. Recent evidence suggests that taking vitamin B3 (500 mg twice daily) can reduce the risk of actinic keratoses and non-melanoma skin cancers. Side effects of vitamin B3 include flushing and headache.

## 2023-06-19 NOTE — DISCHARGE NOTE BEHAVIORAL HEALTH - NSBHDCAPPT2DT_PSY_A_CORE
Telephone outreach to patient today to discuss more about BHH and additional supports. No answer, therefore left a voicemail and provided direct call back number.     Sandy Martel, [facility]  Behavioral Health Home- Social Work Care Coordinator     
09-Jun-2022 14:00

## 2023-07-08 NOTE — ED BEHAVIORAL HEALTH ASSESSMENT NOTE - NS ED BHA REVIEW OF ED CHART AVAILABLE LABS REVIEWED
Yes
PAST SURGICAL HISTORY:  H/O carpal tunnel repair     History of      History of left hip replacement

## 2023-07-14 NOTE — ED ADULT NURSE NOTE - OBJECTIVE STATEMENT
[FreeTextEntry1] : 65 year old female here for an evaluation of breast cancer with newly diagnosed DCIS, intermediate nuclear grade, ER/LA+. S/p left breast lumpectomy 6/6/23 final pathology revealed DCIS, intermediate nuclear grade, cribriform type, with focal necrosis, also involving an intraductal papilloma. Margin benign. She is feeling well overall, surgical site healing well no evidence of infection, seroma or hematoma. Of note, patient's father with h/o  pancreatic and prostate cancer.\par \par # DCIS\par - Patient has an appointment to see [facility]. Given her Breast DCIS score of 8, she is low risk but will defer to radon to assess whether radiation needed. \par - Discussed with patient and her  that with a diagnosis of DCIS, she is now disease free after lumpectomy. Currently, her risk of developing breast cancer will be 1% per year. To reduce risk, it is recommended to start endocrine therapy to reduce this risk by half. \par - Plan to start patient on AI (anastrazole). Discussed the side affects of AI with patient, including but not limited to hot flashes, hair thinning, vaginal dryness, joint achiness and increased cholesterol \par - Script provided for bone density; recommended starting Ca/Vit D supplementation\par - Anastrazole script sent; patient has been advised to begin this after radiation IF there is a plan to start RT\par - Patient provided with contact of genetic counsellor given family hx\par \par RTC in 3 months\par \par \par 
Pt presented to the ED for c/o of suicidal thoughts. Pt states he lost some people that were close to him recently and hes been "going through it". Pt endorses having suicidal ideations. PT doesn't have a clear plan but states he would probably try and overdose on drugs. Pt denies any Homicidal ideation or thoughts of harming others. PT denies any auditory of visual hallucinations. Pt denies smoking or drinking today. Pt endorses smoking marijuana casually.

## 2024-10-23 NOTE — BH CONSULTATION LIAISON PROGRESS NOTE - NSBHCONSULTFOLLOWAFTERCARE_PSY_A_CORE FT
MOUTH TWICE DAILY( GENERIC EQUIVALENT FOR VALTREX) 180 tablet 3    valsartan (DIOVAN) 160 MG tablet Take 1 tablet by mouth nightly 90 tablet 3    aspirin 81 MG EC tablet Take by mouth daily       No current facility-administered medications for this visit.   .    Mr. Benson family history includes Cancer in his paternal uncle; Diabetes in his father; Hypertension in his maternal grandfather and maternal grandmother; Stroke in his father.    Mr. Benson  reports that he has never smoked. He has never used smokeless tobacco. He reports current alcohol use. He reports that he does not use drugs.    No Known Allergies    BP (!) 135/93   Pulse 76   Temp 98.2 °F (36.8 °C)   Resp 16   Ht 1.702 m (5' 7\")   Wt 107.5 kg (237 lb)   SpO2 95%   BMI 37.12 kg/m²     HEENT: Normocephalic, atraumatic, pupils equal and reactive to light. Tympanic membranes intact without erythema or edema. Oropharynx clear.   Neck: Supple, no masses or thyromegaly.  Lungs: clear to auscultation bilaterally.  CV: regular rate and rhythm, without murmurs, rubs, or gallops  Abdomen: soft, nontender, nondistended, no masses. No CVAT tenderness.  Ext: No lower extremity edema.    [place] Drug Database queried and no suspicious activity noted.    Christian was seen today for adhd.    Diagnoses and all orders for this visit:    Attention deficit hyperactivity disorder (ADHD), predominantly inattentive type  -     amphetamine-dextroamphetamine (ADDERALL XR) 30 MG extended release capsule; Take 1 capsule by mouth daily for 30 days. Max Daily Amount: 30 mg  -     amphetamine-dextroamphetamine (ADDERALL XR) 30 MG extended release capsule; Take 1 capsule by mouth daily for 30 days. Max Daily Amount: 30 mg  -     amphetamine-dextroamphetamine (ADDERALL XR) 30 MG extended release capsule; Take 1 capsule by mouth daily for 30 days. Max Daily Amount: 30 mg        Heena Hendrix MD           Please refer patient to [facility], [address] [place] [address], phone number [phone] for supportive psychotherapy and medication management, if agreeable. Patient will need to call listed phone number for appointment.

## 2024-12-27 NOTE — ED BEHAVIORAL HEALTH ASSESSMENT NOTE - ESTIMATED INTELLIGENCE
Caller: MAR    Relationship: [employer]    Best call back number: [phone]    What was the call regarding: MAR CALLED REGARDING A MEDICAL QUESTIONNAIRE/[employer] FORM THAT WAS FAXED OVER. SHE IS NEED A STATUS UPDATE ON THAT.    REQUEST #: [id number]    
Below Average
